# Patient Record
Sex: MALE | Race: WHITE | NOT HISPANIC OR LATINO | Employment: OTHER | URBAN - METROPOLITAN AREA
[De-identification: names, ages, dates, MRNs, and addresses within clinical notes are randomized per-mention and may not be internally consistent; named-entity substitution may affect disease eponyms.]

---

## 2017-01-01 ENCOUNTER — TREATMENT (OUTPATIENT)
Dept: PHYSICAL THERAPY | Facility: CLINIC | Age: 77
End: 2017-01-01

## 2017-01-01 ENCOUNTER — TELEPHONE (OUTPATIENT)
Dept: NEUROLOGY | Facility: CLINIC | Age: 77
End: 2017-01-01

## 2017-01-01 ENCOUNTER — HOSPITAL ENCOUNTER (OUTPATIENT)
Dept: NEUROLOGY | Facility: HOSPITAL | Age: 77
Discharge: HOME | End: 2017-05-31
Attending: PSYCHIATRY & NEUROLOGY | Admitting: PSYCHIATRY & NEUROLOGY

## 2017-01-01 ENCOUNTER — RESULTS ENCOUNTER (OUTPATIENT)
Dept: FAMILY MEDICINE CLINIC | Facility: CLINIC | Age: 77
End: 2017-01-01

## 2017-01-01 ENCOUNTER — APPOINTMENT (OUTPATIENT)
Dept: MRI IMAGING | Facility: HOSPITAL | Age: 77
End: 2017-01-01

## 2017-01-01 ENCOUNTER — FLU SHOT (OUTPATIENT)
Dept: FAMILY MEDICINE CLINIC | Facility: CLINIC | Age: 77
End: 2017-01-01

## 2017-01-01 ENCOUNTER — OFFICE VISIT (OUTPATIENT)
Dept: CARDIOLOGY | Facility: CLINIC | Age: 77
End: 2017-01-01

## 2017-01-01 ENCOUNTER — APPOINTMENT (OUTPATIENT)
Dept: GENERAL RADIOLOGY | Facility: HOSPITAL | Age: 77
End: 2017-01-01

## 2017-01-01 ENCOUNTER — TELEPHONE (OUTPATIENT)
Dept: CARDIOLOGY | Facility: CLINIC | Age: 77
End: 2017-01-01

## 2017-01-01 ENCOUNTER — HOSPITAL ENCOUNTER (INPATIENT)
Facility: HOSPITAL | Age: 77
LOS: 3 days | Discharge: NURSING, REHAB OR LONG-TERM CARE | End: 2018-01-02
Attending: EMERGENCY MEDICINE | Admitting: HOSPITALIST

## 2017-01-01 ENCOUNTER — HOSPITAL ENCOUNTER (EMERGENCY)
Facility: HOSPITAL | Age: 77
Discharge: HOME | End: 2017-11-07
Attending: EMERGENCY MEDICINE | Admitting: EMERGENCY MEDICINE

## 2017-01-01 ENCOUNTER — APPOINTMENT (OUTPATIENT)
Dept: CT IMAGING | Facility: HOSPITAL | Age: 77
End: 2017-01-01

## 2017-01-01 ENCOUNTER — TELEPHONE (OUTPATIENT)
Dept: SOCIAL WORK | Facility: HOSPITAL | Age: 77
End: 2017-01-01

## 2017-01-01 ENCOUNTER — OFFICE VISIT (OUTPATIENT)
Dept: FAMILY MEDICINE CLINIC | Facility: CLINIC | Age: 77
End: 2017-01-01

## 2017-01-01 ENCOUNTER — HOSPITAL ENCOUNTER (OUTPATIENT)
Facility: HOSPITAL | Age: 77
Setting detail: OBSERVATION
Discharge: HOME | End: 2017-04-20
Attending: EMERGENCY MEDICINE | Admitting: INTERNAL MEDICINE

## 2017-01-01 VITALS
TEMPERATURE: 97.4 F | SYSTOLIC BLOOD PRESSURE: 123 MMHG | HEART RATE: 59 BPM | RESPIRATION RATE: 16 BRPM | HEIGHT: 75 IN | DIASTOLIC BLOOD PRESSURE: 81 MMHG | BODY MASS INDEX: 19.89 KG/M2 | WEIGHT: 160 LBS

## 2017-01-01 VITALS
DIASTOLIC BLOOD PRESSURE: 80 MMHG | BODY MASS INDEX: 21.14 KG/M2 | HEIGHT: 75 IN | SYSTOLIC BLOOD PRESSURE: 124 MMHG | HEART RATE: 59 BPM | WEIGHT: 170 LBS

## 2017-01-01 VITALS
HEART RATE: 60 BPM | WEIGHT: 164 LBS | TEMPERATURE: 96.9 F | SYSTOLIC BLOOD PRESSURE: 158 MMHG | RESPIRATION RATE: 18 BRPM | BODY MASS INDEX: 20.39 KG/M2 | DIASTOLIC BLOOD PRESSURE: 95 MMHG | OXYGEN SATURATION: 96 % | HEIGHT: 75 IN

## 2017-01-01 VITALS
RESPIRATION RATE: 20 BRPM | SYSTOLIC BLOOD PRESSURE: 80 MMHG | BODY MASS INDEX: 20.58 KG/M2 | HEART RATE: 83 BPM | TEMPERATURE: 98.2 F | WEIGHT: 165.5 LBS | HEIGHT: 75 IN | DIASTOLIC BLOOD PRESSURE: 51 MMHG | OXYGEN SATURATION: 91 %

## 2017-01-01 DIAGNOSIS — F02.81 ALZHEIMER'S DEMENTIA WITH BEHAVIORAL DISTURBANCE, UNSPECIFIED TIMING OF DEMENTIA ONSET: Primary | ICD-10-CM

## 2017-01-01 DIAGNOSIS — G20 PARKINSON'S DISEASE (HCC): ICD-10-CM

## 2017-01-01 DIAGNOSIS — R29.898 WEAKNESS OF BOTH LOWER LIMBS: ICD-10-CM

## 2017-01-01 DIAGNOSIS — R29.898 WEAKNESS OF BOTH LOWER LIMBS: Primary | ICD-10-CM

## 2017-01-01 DIAGNOSIS — R26.9 GAIT DISTURBANCE: ICD-10-CM

## 2017-01-01 DIAGNOSIS — G20 PARKINSONISM, UNSPECIFIED PARKINSONISM TYPE (HCC): ICD-10-CM

## 2017-01-01 DIAGNOSIS — R26.81 GAIT INSTABILITY: Primary | ICD-10-CM

## 2017-01-01 DIAGNOSIS — G20 PARKINSON DISEASE (HCC): ICD-10-CM

## 2017-01-01 DIAGNOSIS — R26.81 GAIT INSTABILITY: ICD-10-CM

## 2017-01-01 DIAGNOSIS — R44.3 HALLUCINATIONS, UNSPECIFIED: ICD-10-CM

## 2017-01-01 DIAGNOSIS — E03.9 ACQUIRED HYPOTHYROIDISM: ICD-10-CM

## 2017-01-01 DIAGNOSIS — G20 PARKINSON'S DISEASE (HCC): Primary | ICD-10-CM

## 2017-01-01 DIAGNOSIS — F02.81 ALZHEIMER'S DEMENTIA WITH BEHAVIORAL DISTURBANCE, UNSPECIFIED TIMING OF DEMENTIA ONSET: ICD-10-CM

## 2017-01-01 DIAGNOSIS — R39.14 BENIGN PROSTATIC HYPERTROPHY (BPH) WITH INCOMPLETE BLADDER EMPTYING: ICD-10-CM

## 2017-01-01 DIAGNOSIS — N40.1 BENIGN PROSTATIC HYPERTROPHY (BPH) WITH INCOMPLETE BLADDER EMPTYING: ICD-10-CM

## 2017-01-01 DIAGNOSIS — E03.9 ACQUIRED HYPOTHYROIDISM: Primary | ICD-10-CM

## 2017-01-01 DIAGNOSIS — R53.1 GENERALIZED WEAKNESS: Primary | ICD-10-CM

## 2017-01-01 DIAGNOSIS — R77.8 ELEVATED TROPONIN: ICD-10-CM

## 2017-01-01 DIAGNOSIS — R56.9 NEW ONSET SEIZURE (HCC): ICD-10-CM

## 2017-01-01 DIAGNOSIS — R53.1 WEAKNESS: Primary | ICD-10-CM

## 2017-01-01 DIAGNOSIS — G30.9 ALZHEIMER'S DEMENTIA WITH BEHAVIORAL DISTURBANCE, UNSPECIFIED TIMING OF DEMENTIA ONSET: Primary | ICD-10-CM

## 2017-01-01 DIAGNOSIS — R56.9 NEW ONSET SEIZURE (HCC): Primary | ICD-10-CM

## 2017-01-01 DIAGNOSIS — I10 ESSENTIAL HYPERTENSION: Primary | ICD-10-CM

## 2017-01-01 DIAGNOSIS — Z13.6 SCREENING FOR ISCHEMIC HEART DISEASE: ICD-10-CM

## 2017-01-01 DIAGNOSIS — G30.9 ALZHEIMER'S DEMENTIA WITH BEHAVIORAL DISTURBANCE, UNSPECIFIED TIMING OF DEMENTIA ONSET: ICD-10-CM

## 2017-01-01 DIAGNOSIS — R26.2 DIFFICULTY WALKING: ICD-10-CM

## 2017-01-01 LAB
ALBUMIN SERPL-MCNC: 3 G/DL (ref 3.5–5.2)
ALBUMIN SERPL-MCNC: 3.3 G/DL (ref 3.5–5.2)
ALBUMIN SERPL-MCNC: 3.6 G/DL (ref 3.5–5.2)
ALBUMIN SERPL-MCNC: 3.8 G/DL (ref 3.5–5.2)
ALBUMIN SERPL-MCNC: 4 G/DL (ref 3.5–4.8)
ALBUMIN/GLOB SERPL: 1.2 G/DL
ALBUMIN/GLOB SERPL: 1.3 G/DL
ALBUMIN/GLOB SERPL: 1.3 G/DL
ALBUMIN/GLOB SERPL: 1.4 G/DL
ALBUMIN/GLOB SERPL: 2.1 {RATIO} (ref 1.2–2.2)
ALP SERPL-CCNC: 104 U/L (ref 39–117)
ALP SERPL-CCNC: 116 IU/L (ref 39–117)
ALP SERPL-CCNC: 116 U/L (ref 39–117)
ALP SERPL-CCNC: 121 U/L (ref 39–117)
ALP SERPL-CCNC: 133 U/L (ref 39–117)
ALT SERPL W P-5'-P-CCNC: 10 U/L (ref 1–41)
ALT SERPL W P-5'-P-CCNC: 13 U/L (ref 1–41)
ALT SERPL W P-5'-P-CCNC: 13 U/L (ref 1–41)
ALT SERPL W P-5'-P-CCNC: 16 U/L (ref 1–41)
ALT SERPL-CCNC: 8 IU/L (ref 0–44)
ANION GAP SERPL CALCULATED.3IONS-SCNC: 11.1 MMOL/L
ANION GAP SERPL CALCULATED.3IONS-SCNC: 11.2 MMOL/L
ANION GAP SERPL CALCULATED.3IONS-SCNC: 12.5 MMOL/L
ANION GAP SERPL CALCULATED.3IONS-SCNC: 12.9 MMOL/L
ANION GAP SERPL CALCULATED.3IONS-SCNC: 14.7 MMOL/L
AST SERPL-CCNC: 16 U/L (ref 1–40)
AST SERPL-CCNC: 17 U/L (ref 1–40)
AST SERPL-CCNC: 18 IU/L (ref 0–40)
AST SERPL-CCNC: 21 U/L (ref 1–40)
AST SERPL-CCNC: 22 U/L (ref 1–40)
BACTERIA UR QL AUTO: ABNORMAL /HPF
BASOPHILS # BLD AUTO: 0.02 10*3/MM3 (ref 0–0.2)
BASOPHILS # BLD AUTO: 0.03 10*3/MM3 (ref 0–0.2)
BASOPHILS # BLD AUTO: 0.03 10*3/MM3 (ref 0–0.2)
BASOPHILS # BLD AUTO: 0.04 10*3/MM3 (ref 0–0.2)
BASOPHILS # BLD AUTO: 0.1 X10E3/UL (ref 0–0.2)
BASOPHILS NFR BLD AUTO: 0.2 % (ref 0–1.5)
BASOPHILS NFR BLD AUTO: 0.3 % (ref 0–1.5)
BASOPHILS NFR BLD AUTO: 1 %
BILIRUB SERPL-MCNC: 0.3 MG/DL (ref 0.1–1.2)
BILIRUB SERPL-MCNC: 0.5 MG/DL (ref 0–1.2)
BILIRUB SERPL-MCNC: 0.8 MG/DL (ref 0.1–1.2)
BILIRUB UR QL STRIP: NEGATIVE
BUN BLD-MCNC: 12 MG/DL (ref 8–23)
BUN BLD-MCNC: 14 MG/DL (ref 8–23)
BUN BLD-MCNC: 17 MG/DL (ref 8–23)
BUN BLD-MCNC: 22 MG/DL (ref 8–23)
BUN BLD-MCNC: 25 MG/DL (ref 8–23)
BUN SERPL-MCNC: 20 MG/DL (ref 8–27)
BUN/CREAT SERPL: 11.5 (ref 7–25)
BUN/CREAT SERPL: 13.5 (ref 7–25)
BUN/CREAT SERPL: 15.3 (ref 7–25)
BUN/CREAT SERPL: 16 (ref 10–24)
BUN/CREAT SERPL: 19.8 (ref 7–25)
BUN/CREAT SERPL: 21.9 (ref 7–25)
CALCIUM SERPL-MCNC: 8.7 MG/DL (ref 8.6–10.2)
CALCIUM SPEC-SCNC: 8 MG/DL (ref 8.6–10.5)
CALCIUM SPEC-SCNC: 8.3 MG/DL (ref 8.6–10.5)
CALCIUM SPEC-SCNC: 8.6 MG/DL (ref 8.6–10.5)
CALCIUM SPEC-SCNC: 8.8 MG/DL (ref 8.6–10.5)
CALCIUM SPEC-SCNC: 8.9 MG/DL (ref 8.6–10.5)
CHLORIDE SERPL-SCNC: 101 MMOL/L (ref 96–106)
CHLORIDE SERPL-SCNC: 101 MMOL/L (ref 98–107)
CHLORIDE SERPL-SCNC: 103 MMOL/L (ref 98–107)
CHLORIDE SERPL-SCNC: 104 MMOL/L (ref 98–107)
CHLORIDE SERPL-SCNC: 98 MMOL/L (ref 98–107)
CHLORIDE SERPL-SCNC: 99 MMOL/L (ref 98–107)
CHOLEST SERPL-MCNC: 131 MG/DL (ref 100–199)
CLARITY UR: ABNORMAL
CLARITY UR: CLEAR
CO2 SERPL-SCNC: 22.3 MMOL/L (ref 22–29)
CO2 SERPL-SCNC: 22.5 MMOL/L (ref 22–29)
CO2 SERPL-SCNC: 23 MMOL/L (ref 18–29)
CO2 SERPL-SCNC: 24.9 MMOL/L (ref 22–29)
CO2 SERPL-SCNC: 26.1 MMOL/L (ref 22–29)
CO2 SERPL-SCNC: 26.8 MMOL/L (ref 22–29)
COLOR UR: ABNORMAL
COLOR UR: YELLOW
CREAT BLD-MCNC: 0.89 MG/DL (ref 0.76–1.27)
CREAT BLD-MCNC: 1.11 MG/DL (ref 0.76–1.27)
CREAT BLD-MCNC: 1.11 MG/DL (ref 0.76–1.27)
CREAT BLD-MCNC: 1.14 MG/DL (ref 0.76–1.27)
CREAT BLD-MCNC: 1.22 MG/DL (ref 0.76–1.27)
CREAT SERPL-MCNC: 1.24 MG/DL (ref 0.76–1.27)
DEPRECATED RDW RBC AUTO: 43.9 FL (ref 37–54)
DEPRECATED RDW RBC AUTO: 44.1 FL (ref 37–54)
DEPRECATED RDW RBC AUTO: 45.2 FL (ref 37–54)
DEPRECATED RDW RBC AUTO: 45.3 FL (ref 37–54)
DEPRECATED RDW RBC AUTO: 47 FL (ref 37–54)
EOSINOPHIL # BLD AUTO: 0.02 10*3/MM3 (ref 0–0.7)
EOSINOPHIL # BLD AUTO: 0.02 10*3/MM3 (ref 0–0.7)
EOSINOPHIL # BLD AUTO: 0.1 X10E3/UL (ref 0–0.4)
EOSINOPHIL # BLD AUTO: 0.15 10*3/MM3 (ref 0–0.7)
EOSINOPHIL # BLD AUTO: 0.19 10*3/MM3 (ref 0–0.7)
EOSINOPHIL NFR BLD AUTO: 0.2 % (ref 0.3–6.2)
EOSINOPHIL NFR BLD AUTO: 0.3 % (ref 0.3–6.2)
EOSINOPHIL NFR BLD AUTO: 1 %
EOSINOPHIL NFR BLD AUTO: 1.2 % (ref 0.3–6.2)
EOSINOPHIL NFR BLD AUTO: 1.7 % (ref 0.3–6.2)
ERYTHROCYTE [DISTWIDTH] IN BLOOD BY AUTOMATED COUNT: 13.4 % (ref 11.5–14.5)
ERYTHROCYTE [DISTWIDTH] IN BLOOD BY AUTOMATED COUNT: 13.6 % (ref 11.5–14.5)
ERYTHROCYTE [DISTWIDTH] IN BLOOD BY AUTOMATED COUNT: 14 % (ref 11.5–14.5)
ERYTHROCYTE [DISTWIDTH] IN BLOOD BY AUTOMATED COUNT: 14 % (ref 11.5–14.5)
ERYTHROCYTE [DISTWIDTH] IN BLOOD BY AUTOMATED COUNT: 14.6 % (ref 11.5–14.5)
ERYTHROCYTE [DISTWIDTH] IN BLOOD BY AUTOMATED COUNT: 14.9 % (ref 12.3–15.4)
GFR SERPL CREATININE-BSD FRML MDRD: 57 ML/MIN/1.73
GFR SERPL CREATININE-BSD FRML MDRD: 62 ML/MIN/1.73
GFR SERPL CREATININE-BSD FRML MDRD: 64 ML/MIN/1.73
GFR SERPL CREATININE-BSD FRML MDRD: 64 ML/MIN/1.73
GFR SERPL CREATININE-BSD FRML MDRD: 83 ML/MIN/1.73
GLOBULIN SER CALC-MCNC: 1.9 G/DL (ref 1.5–4.5)
GLOBULIN UR ELPH-MCNC: 2.3 GM/DL
GLOBULIN UR ELPH-MCNC: 2.7 GM/DL
GLOBULIN UR ELPH-MCNC: 2.7 GM/DL
GLOBULIN UR ELPH-MCNC: 2.8 GM/DL
GLUCOSE BLD-MCNC: 169 MG/DL (ref 65–99)
GLUCOSE BLD-MCNC: 76 MG/DL (ref 65–99)
GLUCOSE BLD-MCNC: 79 MG/DL (ref 65–99)
GLUCOSE BLD-MCNC: 88 MG/DL (ref 65–99)
GLUCOSE BLD-MCNC: 91 MG/DL (ref 65–99)
GLUCOSE BLDC GLUCOMTR-MCNC: 92 MG/DL (ref 70–130)
GLUCOSE SERPL-MCNC: 86 MG/DL (ref 65–99)
GLUCOSE UR STRIP-MCNC: NEGATIVE MG/DL
HCT VFR BLD AUTO: 34.3 % (ref 40.4–52.2)
HCT VFR BLD AUTO: 36.5 % (ref 40.4–52.2)
HCT VFR BLD AUTO: 36.6 % (ref 40.4–52.2)
HCT VFR BLD AUTO: 37.7 % (ref 37.5–51)
HCT VFR BLD AUTO: 37.7 % (ref 40.4–52.2)
HCT VFR BLD AUTO: 40.4 % (ref 40.4–52.2)
HDLC SERPL-MCNC: 57 MG/DL
HGB BLD-MCNC: 11.1 G/DL (ref 13.7–17.6)
HGB BLD-MCNC: 11.7 G/DL (ref 13.7–17.6)
HGB BLD-MCNC: 11.9 G/DL (ref 13.7–17.6)
HGB BLD-MCNC: 12.3 G/DL (ref 13.7–17.6)
HGB BLD-MCNC: 12.4 G/DL (ref 12.6–17.7)
HGB BLD-MCNC: 13.2 G/DL (ref 13.7–17.6)
HGB UR QL STRIP.AUTO: ABNORMAL
HGB UR QL STRIP.AUTO: NEGATIVE
HOLD SPECIMEN: NORMAL
HYALINE CASTS UR QL AUTO: ABNORMAL /LPF
IMM GRANULOCYTES # BLD: 0 X10E3/UL (ref 0–0.1)
IMM GRANULOCYTES # BLD: 0.02 10*3/MM3 (ref 0–0.03)
IMM GRANULOCYTES # BLD: 0.03 10*3/MM3 (ref 0–0.03)
IMM GRANULOCYTES # BLD: 0.03 10*3/MM3 (ref 0–0.03)
IMM GRANULOCYTES # BLD: 0.04 10*3/MM3 (ref 0–0.03)
IMM GRANULOCYTES NFR BLD: 0 %
IMM GRANULOCYTES NFR BLD: 0.2 % (ref 0–0.5)
IMM GRANULOCYTES NFR BLD: 0.3 % (ref 0–0.5)
IMM GRANULOCYTES NFR BLD: 0.3 % (ref 0–0.5)
IMM GRANULOCYTES NFR BLD: 0.5 % (ref 0–0.5)
KETONES UR QL STRIP: ABNORMAL
KETONES UR QL STRIP: NEGATIVE
LDLC SERPL CALC-MCNC: 60 MG/DL (ref 0–99)
LEUKOCYTE ESTERASE UR QL STRIP.AUTO: NEGATIVE
LYMPHOCYTES # BLD AUTO: 0.99 10*3/MM3 (ref 0.9–4.8)
LYMPHOCYTES # BLD AUTO: 1.19 10*3/MM3 (ref 0.9–4.8)
LYMPHOCYTES # BLD AUTO: 1.72 10*3/MM3 (ref 0.9–4.8)
LYMPHOCYTES # BLD AUTO: 1.91 10*3/MM3 (ref 0.9–4.8)
LYMPHOCYTES # BLD AUTO: 2.1 X10E3/UL (ref 0.7–3.1)
LYMPHOCYTES NFR BLD AUTO: 14.8 % (ref 19.6–45.3)
LYMPHOCYTES NFR BLD AUTO: 15.2 % (ref 19.6–45.3)
LYMPHOCYTES NFR BLD AUTO: 15.3 % (ref 19.6–45.3)
LYMPHOCYTES NFR BLD AUTO: 20 %
LYMPHOCYTES NFR BLD AUTO: 9.6 % (ref 19.6–45.3)
MAGNESIUM SERPL-MCNC: 2.1 MG/DL (ref 1.6–2.4)
MAGNESIUM SERPL-MCNC: 2.1 MG/DL (ref 1.6–2.4)
MCH RBC QN AUTO: 28.1 PG (ref 26.6–33)
MCH RBC QN AUTO: 28.5 PG (ref 27–32.7)
MCH RBC QN AUTO: 28.7 PG (ref 27–32.7)
MCH RBC QN AUTO: 28.8 PG (ref 27–32.7)
MCH RBC QN AUTO: 28.8 PG (ref 27–32.7)
MCH RBC QN AUTO: 28.9 PG (ref 27–32.7)
MCHC RBC AUTO-ENTMCNC: 32 G/DL (ref 32.6–36.4)
MCHC RBC AUTO-ENTMCNC: 32.4 G/DL (ref 32.6–36.4)
MCHC RBC AUTO-ENTMCNC: 32.6 G/DL (ref 32.6–36.4)
MCHC RBC AUTO-ENTMCNC: 32.6 G/DL (ref 32.6–36.4)
MCHC RBC AUTO-ENTMCNC: 32.7 G/DL (ref 32.6–36.4)
MCHC RBC AUTO-ENTMCNC: 32.9 G/DL (ref 31.5–35.7)
MCV RBC AUTO: 86 FL (ref 79–97)
MCV RBC AUTO: 87.9 FL (ref 79.8–96.2)
MCV RBC AUTO: 88.2 FL (ref 79.8–96.2)
MCV RBC AUTO: 88.4 FL (ref 79.8–96.2)
MCV RBC AUTO: 88.7 FL (ref 79.8–96.2)
MCV RBC AUTO: 89.7 FL (ref 79.8–96.2)
MONOCYTES # BLD AUTO: 0.49 10*3/MM3 (ref 0.2–1.2)
MONOCYTES # BLD AUTO: 0.6 X10E3/UL (ref 0.1–0.9)
MONOCYTES # BLD AUTO: 0.97 10*3/MM3 (ref 0.2–1.2)
MONOCYTES # BLD AUTO: 1.14 10*3/MM3 (ref 0.2–1.2)
MONOCYTES # BLD AUTO: 1.19 10*3/MM3 (ref 0.2–1.2)
MONOCYTES NFR BLD AUTO: 6 %
MONOCYTES NFR BLD AUTO: 7.6 % (ref 5–12)
MONOCYTES NFR BLD AUTO: 8.6 % (ref 5–12)
MONOCYTES NFR BLD AUTO: 8.8 % (ref 5–12)
MONOCYTES NFR BLD AUTO: 9.6 % (ref 5–12)
NEUTROPHILS # BLD AUTO: 4.94 10*3/MM3 (ref 1.9–8.1)
NEUTROPHILS # BLD AUTO: 7.6 X10E3/UL (ref 1.4–7)
NEUTROPHILS # BLD AUTO: 8.39 10*3/MM3 (ref 1.9–8.1)
NEUTROPHILS # BLD AUTO: 9.63 10*3/MM3 (ref 1.9–8.1)
NEUTROPHILS # BLD AUTO: 9.98 10*3/MM3 (ref 1.9–8.1)
NEUTROPHILS NFR BLD AUTO: 72 %
NEUTROPHILS NFR BLD AUTO: 73.9 % (ref 42.7–76)
NEUTROPHILS NFR BLD AUTO: 74.7 % (ref 42.7–76)
NEUTROPHILS NFR BLD AUTO: 76 % (ref 42.7–76)
NEUTROPHILS NFR BLD AUTO: 80.1 % (ref 42.7–76)
NITRITE UR QL STRIP: NEGATIVE
NT-PROBNP SERPL-MCNC: 778 PG/ML (ref 0–1800)
PH UR STRIP.AUTO: 6 [PH] (ref 5–8)
PH UR STRIP.AUTO: 6 [PH] (ref 5–8)
PH UR STRIP.AUTO: 6.5 [PH] (ref 5–8)
PH UR STRIP.AUTO: 6.5 [PH] (ref 5–8)
PLATELET # BLD AUTO: 180 10*3/MM3 (ref 140–500)
PLATELET # BLD AUTO: 220 10*3/MM3 (ref 140–500)
PLATELET # BLD AUTO: 245 10*3/MM3 (ref 140–500)
PLATELET # BLD AUTO: 253 10*3/MM3 (ref 140–500)
PLATELET # BLD AUTO: 290 10*3/MM3 (ref 140–500)
PLATELET # BLD AUTO: 309 X10E3/UL (ref 150–379)
PMV BLD AUTO: 9.1 FL (ref 6–12)
PMV BLD AUTO: 9.3 FL (ref 6–12)
PMV BLD AUTO: 9.4 FL (ref 6–12)
PMV BLD AUTO: 9.5 FL (ref 6–12)
PMV BLD AUTO: 9.6 FL (ref 6–12)
POTASSIUM BLD-SCNC: 3.7 MMOL/L (ref 3.5–5.2)
POTASSIUM BLD-SCNC: 4.1 MMOL/L (ref 3.5–5.2)
POTASSIUM BLD-SCNC: 4.3 MMOL/L (ref 3.5–5.2)
POTASSIUM BLD-SCNC: 4.4 MMOL/L (ref 3.5–5.2)
POTASSIUM BLD-SCNC: 4.5 MMOL/L (ref 3.5–5.2)
POTASSIUM SERPL-SCNC: 4.7 MMOL/L (ref 3.5–5.2)
PROCALCITONIN SERPL-MCNC: 0.05 NG/ML (ref 0.1–0.25)
PROT SERPL-MCNC: 5.3 G/DL (ref 6–8.5)
PROT SERPL-MCNC: 5.9 G/DL (ref 6–8.5)
PROT SERPL-MCNC: 6.1 G/DL (ref 6–8.5)
PROT SERPL-MCNC: 6.3 G/DL (ref 6–8.5)
PROT SERPL-MCNC: 6.5 G/DL (ref 6–8.5)
PROT UR QL STRIP: ABNORMAL
PROT UR QL STRIP: ABNORMAL
PROT UR QL STRIP: NEGATIVE
PROT UR QL STRIP: NEGATIVE
PSA SERPL-MCNC: 1.4 NG/ML (ref 0–4)
RBC # BLD AUTO: 3.9 10*6/MM3 (ref 4.6–6)
RBC # BLD AUTO: 4.08 10*6/MM3 (ref 4.6–6)
RBC # BLD AUTO: 4.13 10*6/MM3 (ref 4.6–6)
RBC # BLD AUTO: 4.25 10*6/MM3 (ref 4.6–6)
RBC # BLD AUTO: 4.41 X10E6/UL (ref 4.14–5.8)
RBC # BLD AUTO: 4.58 10*6/MM3 (ref 4.6–6)
RBC # UR: ABNORMAL /HPF
REF LAB TEST METHOD: ABNORMAL
SODIUM BLD-SCNC: 135 MMOL/L (ref 136–145)
SODIUM BLD-SCNC: 138 MMOL/L (ref 136–145)
SODIUM BLD-SCNC: 138 MMOL/L (ref 136–145)
SODIUM BLD-SCNC: 139 MMOL/L (ref 136–145)
SODIUM BLD-SCNC: 140 MMOL/L (ref 136–145)
SODIUM SERPL-SCNC: 143 MMOL/L (ref 134–144)
SP GR UR STRIP: 1.01 (ref 1–1.03)
SP GR UR STRIP: 1.02 (ref 1–1.03)
SQUAMOUS #/AREA URNS HPF: ABNORMAL /HPF
TRIGL SERPL-MCNC: 71 MG/DL (ref 0–149)
TROPONIN T SERPL-MCNC: 0.02 NG/ML (ref 0–0.03)
TROPONIN T SERPL-MCNC: 0.02 NG/ML (ref 0–0.03)
TROPONIN T SERPL-MCNC: 0.03 NG/ML (ref 0–0.03)
TROPONIN T SERPL-MCNC: 0.04 NG/ML (ref 0–0.03)
TSH SERPL DL<=0.005 MIU/L-ACNC: 6.08 UIU/ML (ref 0.45–4.5)
TSH SERPL DL<=0.05 MIU/L-ACNC: 2.44 MIU/ML (ref 0.27–4.2)
UROBILINOGEN UR QL STRIP: ABNORMAL
UROBILINOGEN UR QL STRIP: ABNORMAL
UROBILINOGEN UR QL STRIP: NORMAL
UROBILINOGEN UR QL STRIP: NORMAL
VIT B12 BLD-MCNC: 434 PG/ML (ref 211–946)
VLDLC SERPL CALC-MCNC: 14 MG/DL (ref 5–40)
WBC # BLD AUTO: 10.6 X10E3/UL (ref 3.4–10.8)
WBC NRBC COR # BLD: 11.33 10*3/MM3 (ref 4.5–10.7)
WBC NRBC COR # BLD: 12.45 10*3/MM3 (ref 4.5–10.7)
WBC NRBC COR # BLD: 12.89 10*3/MM3 (ref 4.5–10.7)
WBC NRBC COR # BLD: 6.49 10*3/MM3 (ref 4.5–10.7)
WBC NRBC COR # BLD: 9.22 10*3/MM3 (ref 4.5–10.7)
WBC UR QL AUTO: ABNORMAL /HPF
WHOLE BLOOD HOLD SPECIMEN: NORMAL

## 2017-01-01 PROCEDURE — 95812 EEG 41-60 MINUTES: CPT

## 2017-01-01 PROCEDURE — 99204 OFFICE O/P NEW MOD 45 MIN: CPT | Performed by: PSYCHIATRY & NEUROLOGY

## 2017-01-01 PROCEDURE — 25010000002 PIPERACILLIN SOD-TAZOBACTAM PER 1 G: Performed by: HOSPITALIST

## 2017-01-01 PROCEDURE — 70551 MRI BRAIN STEM W/O DYE: CPT

## 2017-01-01 PROCEDURE — 84484 ASSAY OF TROPONIN QUANT: CPT | Performed by: EMERGENCY MEDICINE

## 2017-01-01 PROCEDURE — 85025 COMPLETE CBC W/AUTO DIFF WBC: CPT | Performed by: INTERNAL MEDICINE

## 2017-01-01 PROCEDURE — 97116 GAIT TRAINING THERAPY: CPT | Performed by: PHYSICAL THERAPIST

## 2017-01-01 PROCEDURE — 70450 CT HEAD/BRAIN W/O DYE: CPT

## 2017-01-01 PROCEDURE — G0378 HOSPITAL OBSERVATION PER HR: HCPCS

## 2017-01-01 PROCEDURE — 81001 URINALYSIS AUTO W/SCOPE: CPT | Performed by: EMERGENCY MEDICINE

## 2017-01-01 PROCEDURE — 96360 HYDRATION IV INFUSION INIT: CPT

## 2017-01-01 PROCEDURE — 99284 EMERGENCY DEPT VISIT MOD MDM: CPT

## 2017-01-01 PROCEDURE — 97110 THERAPEUTIC EXERCISES: CPT | Performed by: PHYSICAL THERAPIST

## 2017-01-01 PROCEDURE — 36415 COLL VENOUS BLD VENIPUNCTURE: CPT | Performed by: EMERGENCY MEDICINE

## 2017-01-01 PROCEDURE — 99214 OFFICE O/P EST MOD 30 MIN: CPT | Performed by: FAMILY MEDICINE

## 2017-01-01 PROCEDURE — 83880 ASSAY OF NATRIURETIC PEPTIDE: CPT | Performed by: EMERGENCY MEDICINE

## 2017-01-01 PROCEDURE — 25010000002 HEPARIN (PORCINE) PER 1000 UNITS: Performed by: INTERNAL MEDICINE

## 2017-01-01 PROCEDURE — 81003 URINALYSIS AUTO W/O SCOPE: CPT | Performed by: EMERGENCY MEDICINE

## 2017-01-01 PROCEDURE — 96372 THER/PROPH/DIAG INJ SC/IM: CPT

## 2017-01-01 PROCEDURE — 85025 COMPLETE CBC W/AUTO DIFF WBC: CPT | Performed by: EMERGENCY MEDICINE

## 2017-01-01 PROCEDURE — 84484 ASSAY OF TROPONIN QUANT: CPT | Performed by: INTERNAL MEDICINE

## 2017-01-01 PROCEDURE — G8979 MOBILITY GOAL STATUS: HCPCS

## 2017-01-01 PROCEDURE — 99213 OFFICE O/P EST LOW 20 MIN: CPT | Performed by: INTERNAL MEDICINE

## 2017-01-01 PROCEDURE — 97162 PT EVAL MOD COMPLEX 30 MIN: CPT

## 2017-01-01 PROCEDURE — 93010 ELECTROCARDIOGRAM REPORT: CPT | Performed by: INTERNAL MEDICINE

## 2017-01-01 PROCEDURE — 80053 COMPREHEN METABOLIC PANEL: CPT | Performed by: EMERGENCY MEDICINE

## 2017-01-01 PROCEDURE — 93000 ELECTROCARDIOGRAM COMPLETE: CPT | Performed by: INTERNAL MEDICINE

## 2017-01-01 PROCEDURE — 97110 THERAPEUTIC EXERCISES: CPT

## 2017-01-01 PROCEDURE — 82607 VITAMIN B-12: CPT | Performed by: PSYCHIATRY & NEUROLOGY

## 2017-01-01 PROCEDURE — 94799 UNLISTED PULMONARY SVC/PX: CPT

## 2017-01-01 PROCEDURE — 81003 URINALYSIS AUTO W/O SCOPE: CPT | Performed by: HOSPITALIST

## 2017-01-01 PROCEDURE — 97530 THERAPEUTIC ACTIVITIES: CPT | Performed by: PHYSICAL THERAPIST

## 2017-01-01 PROCEDURE — 84145 PROCALCITONIN (PCT): CPT | Performed by: HOSPITALIST

## 2017-01-01 PROCEDURE — 97112 NEUROMUSCULAR REEDUCATION: CPT | Performed by: PHYSICAL THERAPIST

## 2017-01-01 PROCEDURE — 82962 GLUCOSE BLOOD TEST: CPT

## 2017-01-01 PROCEDURE — 93005 ELECTROCARDIOGRAM TRACING: CPT | Performed by: EMERGENCY MEDICINE

## 2017-01-01 PROCEDURE — 71020 HC CHEST PA AND LATERAL: CPT

## 2017-01-01 PROCEDURE — G8978 MOBILITY CURRENT STATUS: HCPCS

## 2017-01-01 PROCEDURE — 99285 EMERGENCY DEPT VISIT HI MDM: CPT

## 2017-01-01 PROCEDURE — G0008 ADMIN INFLUENZA VIRUS VAC: HCPCS | Performed by: FAMILY MEDICINE

## 2017-01-01 PROCEDURE — 84443 ASSAY THYROID STIM HORMONE: CPT | Performed by: PSYCHIATRY & NEUROLOGY

## 2017-01-01 PROCEDURE — 97161 PT EVAL LOW COMPLEX 20 MIN: CPT | Performed by: PHYSICAL THERAPIST

## 2017-01-01 PROCEDURE — 85027 COMPLETE CBC AUTOMATED: CPT | Performed by: HOSPITALIST

## 2017-01-01 PROCEDURE — 90686 IIV4 VACC NO PRSV 0.5 ML IM: CPT | Performed by: FAMILY MEDICINE

## 2017-01-01 PROCEDURE — 94640 AIRWAY INHALATION TREATMENT: CPT

## 2017-01-01 PROCEDURE — 80053 COMPREHEN METABOLIC PANEL: CPT | Performed by: INTERNAL MEDICINE

## 2017-01-01 PROCEDURE — 96361 HYDRATE IV INFUSION ADD-ON: CPT

## 2017-01-01 PROCEDURE — 83735 ASSAY OF MAGNESIUM: CPT | Performed by: EMERGENCY MEDICINE

## 2017-01-01 PROCEDURE — 80048 BASIC METABOLIC PNL TOTAL CA: CPT | Performed by: HOSPITALIST

## 2017-01-01 PROCEDURE — 71010 HC CHEST PA OR AP: CPT

## 2017-01-01 RX ORDER — PRAMIPEXOLE DIHYDROCHLORIDE 0.12 MG/1
0.12 TABLET ORAL 3 TIMES DAILY
COMMUNITY
End: 2018-01-01 | Stop reason: HOSPADM

## 2017-01-01 RX ORDER — MEMANTINE HYDROCHLORIDE 10 MG/1
10 TABLET ORAL EVERY 12 HOURS SCHEDULED
Status: DISCONTINUED | OUTPATIENT
Start: 2017-01-01 | End: 2017-01-01 | Stop reason: HOSPADM

## 2017-01-01 RX ORDER — SODIUM CHLORIDE 0.9 % (FLUSH) 0.9 %
1-10 SYRINGE (ML) INJECTION AS NEEDED
Status: DISCONTINUED | OUTPATIENT
Start: 2017-01-01 | End: 2018-01-01 | Stop reason: HOSPADM

## 2017-01-01 RX ORDER — LEVOTHYROXINE SODIUM 0.05 MG/1
50 TABLET ORAL DAILY
Qty: 90 TABLET | Refills: 1 | Status: ON HOLD | OUTPATIENT
Start: 2017-01-01 | End: 2018-01-01

## 2017-01-01 RX ORDER — ISOSORBIDE MONONITRATE 30 MG/1
30 TABLET, EXTENDED RELEASE ORAL DAILY
Qty: 30 TABLET | Refills: 3 | Status: SHIPPED | OUTPATIENT
Start: 2017-01-01

## 2017-01-01 RX ORDER — ISOSORBIDE MONONITRATE 30 MG/1
30 TABLET, EXTENDED RELEASE ORAL DAILY
Status: DISCONTINUED | OUTPATIENT
Start: 2017-01-01 | End: 2018-01-01 | Stop reason: HOSPADM

## 2017-01-01 RX ORDER — AMLODIPINE BESYLATE 2.5 MG/1
2.5 TABLET ORAL DAILY
Qty: 30 TABLET | Refills: 3 | Status: SHIPPED | OUTPATIENT
Start: 2017-01-01 | End: 2018-01-01 | Stop reason: HOSPADM

## 2017-01-01 RX ORDER — ONDANSETRON 2 MG/ML
4 INJECTION INTRAMUSCULAR; INTRAVENOUS EVERY 6 HOURS PRN
Status: DISCONTINUED | OUTPATIENT
Start: 2017-01-01 | End: 2017-01-01 | Stop reason: HOSPADM

## 2017-01-01 RX ORDER — SODIUM CHLORIDE 9 MG/ML
100 INJECTION, SOLUTION INTRAVENOUS CONTINUOUS
Status: DISCONTINUED | OUTPATIENT
Start: 2017-01-01 | End: 2017-01-01

## 2017-01-01 RX ORDER — SODIUM CHLORIDE 0.9 % (FLUSH) 0.9 %
10 SYRINGE (ML) INJECTION AS NEEDED
Status: DISCONTINUED | OUTPATIENT
Start: 2017-01-01 | End: 2018-01-01 | Stop reason: HOSPADM

## 2017-01-01 RX ORDER — RISPERIDONE 1 MG/1
1 TABLET ORAL NIGHTLY
Status: DISCONTINUED | OUTPATIENT
Start: 2017-01-01 | End: 2018-01-01 | Stop reason: HOSPADM

## 2017-01-01 RX ORDER — PRAMIPEXOLE DIHYDROCHLORIDE 0.12 MG/1
TABLET ORAL
COMMUNITY
Start: 2017-01-01 | End: 2017-01-01 | Stop reason: SDUPTHER

## 2017-01-01 RX ORDER — ISOSORBIDE MONONITRATE 30 MG/1
30 TABLET, EXTENDED RELEASE ORAL
Qty: 30 TABLET | Refills: 3 | Status: SHIPPED | OUTPATIENT
Start: 2017-01-01 | End: 2017-01-01 | Stop reason: SDUPTHER

## 2017-01-01 RX ORDER — MIRTAZAPINE 30 MG/1
30 TABLET, FILM COATED ORAL DAILY
COMMUNITY
Start: 2017-01-01 | End: 2018-01-01 | Stop reason: HOSPADM

## 2017-01-01 RX ORDER — LEVOTHYROXINE SODIUM 0.05 MG/1
50 TABLET ORAL
Status: DISCONTINUED | OUTPATIENT
Start: 2017-01-01 | End: 2018-01-01 | Stop reason: HOSPADM

## 2017-01-01 RX ORDER — AMLODIPINE BESYLATE 2.5 MG/1
2.5 TABLET ORAL DAILY
Status: DISCONTINUED | OUTPATIENT
Start: 2017-01-01 | End: 2018-01-01 | Stop reason: HOSPADM

## 2017-01-01 RX ORDER — MEMANTINE HYDROCHLORIDE 28 MG/1
28 CAPSULE, EXTENDED RELEASE ORAL DAILY
Qty: 90 CAPSULE | Refills: 0 | Status: SHIPPED | OUTPATIENT
Start: 2017-01-01 | End: 2017-01-01 | Stop reason: SDUPTHER

## 2017-01-01 RX ORDER — RISPERIDONE 1 MG/1
1 TABLET ORAL NIGHTLY
COMMUNITY
Start: 2017-01-01 | End: 2018-01-01 | Stop reason: HOSPADM

## 2017-01-01 RX ORDER — SODIUM CHLORIDE 0.9 % (FLUSH) 0.9 %
1-10 SYRINGE (ML) INJECTION AS NEEDED
Status: DISCONTINUED | OUTPATIENT
Start: 2017-01-01 | End: 2017-01-01 | Stop reason: HOSPADM

## 2017-01-01 RX ORDER — MEMANTINE HYDROCHLORIDE 28 MG/1
28 CAPSULE, EXTENDED RELEASE ORAL DAILY
COMMUNITY

## 2017-01-01 RX ORDER — POLYETHYLENE GLYCOL 3350 17 G/17G
17 POWDER, FOR SOLUTION ORAL DAILY PRN
Status: DISCONTINUED | OUTPATIENT
Start: 2017-01-01 | End: 2018-01-01 | Stop reason: HOSPADM

## 2017-01-01 RX ORDER — SODIUM CHLORIDE 0.9 % (FLUSH) 0.9 %
10 SYRINGE (ML) INJECTION AS NEEDED
Status: DISCONTINUED | OUTPATIENT
Start: 2017-01-01 | End: 2017-01-01 | Stop reason: HOSPADM

## 2017-01-01 RX ORDER — IPRATROPIUM BROMIDE AND ALBUTEROL SULFATE 2.5; .5 MG/3ML; MG/3ML
3 SOLUTION RESPIRATORY (INHALATION)
Status: DISCONTINUED | OUTPATIENT
Start: 2017-01-01 | End: 2017-01-01 | Stop reason: HOSPADM

## 2017-01-01 RX ORDER — TAMSULOSIN HYDROCHLORIDE 0.4 MG/1
0.4 CAPSULE ORAL DAILY
Status: DISCONTINUED | OUTPATIENT
Start: 2018-01-01 | End: 2018-01-01 | Stop reason: HOSPADM

## 2017-01-01 RX ORDER — LEVOTHYROXINE SODIUM 0.05 MG/1
50 TABLET ORAL
Status: DISCONTINUED | OUTPATIENT
Start: 2017-01-01 | End: 2017-01-01 | Stop reason: HOSPADM

## 2017-01-01 RX ORDER — ISOSORBIDE MONONITRATE 30 MG/1
30 TABLET, EXTENDED RELEASE ORAL
Qty: 30 TABLET | Refills: 0 | Status: SHIPPED | OUTPATIENT
Start: 2017-01-01 | End: 2017-01-01 | Stop reason: SDUPTHER

## 2017-01-01 RX ORDER — TAMSULOSIN HYDROCHLORIDE 0.4 MG/1
0.4 CAPSULE ORAL NIGHTLY
Status: DISCONTINUED | OUTPATIENT
Start: 2017-01-01 | End: 2017-01-01

## 2017-01-01 RX ORDER — MEMANTINE HYDROCHLORIDE 7 MG/1
28 CAPSULE, EXTENDED RELEASE ORAL DAILY
Status: DISCONTINUED | OUTPATIENT
Start: 2017-01-01 | End: 2017-01-01 | Stop reason: CLARIF

## 2017-01-01 RX ORDER — ISOSORBIDE MONONITRATE 30 MG/1
30 TABLET, EXTENDED RELEASE ORAL
Status: DISCONTINUED | OUTPATIENT
Start: 2017-01-01 | End: 2017-01-01 | Stop reason: HOSPADM

## 2017-01-01 RX ORDER — HEPARIN SODIUM 5000 [USP'U]/ML
5000 INJECTION, SOLUTION INTRAVENOUS; SUBCUTANEOUS EVERY 12 HOURS SCHEDULED
Status: DISCONTINUED | OUTPATIENT
Start: 2017-01-01 | End: 2017-01-01 | Stop reason: HOSPADM

## 2017-01-01 RX ORDER — DONEPEZIL HYDROCHLORIDE 10 MG/1
20 TABLET, FILM COATED ORAL NIGHTLY
Status: DISCONTINUED | OUTPATIENT
Start: 2017-01-01 | End: 2018-01-01 | Stop reason: HOSPADM

## 2017-01-01 RX ORDER — PRAMIPEXOLE DIHYDROCHLORIDE 0.25 MG/1
0.12 TABLET ORAL 3 TIMES DAILY
Status: DISCONTINUED | OUTPATIENT
Start: 2017-01-01 | End: 2017-01-01

## 2017-01-01 RX ORDER — TAMSULOSIN HYDROCHLORIDE 0.4 MG/1
0.4 CAPSULE ORAL NIGHTLY
Status: DISCONTINUED | OUTPATIENT
Start: 2017-01-01 | End: 2017-01-01 | Stop reason: HOSPADM

## 2017-01-01 RX ORDER — ISOSORBIDE MONONITRATE 30 MG/1
TABLET, EXTENDED RELEASE ORAL
Qty: 30 TABLET | Refills: 2 | Status: SHIPPED | OUTPATIENT
Start: 2017-01-01 | End: 2017-01-01 | Stop reason: SDUPTHER

## 2017-01-01 RX ORDER — ISOSORBIDE MONONITRATE 30 MG/1
30 TABLET, EXTENDED RELEASE ORAL DAILY
Qty: 30 TABLET | Refills: 1 | Status: SHIPPED | OUTPATIENT
Start: 2017-01-01 | End: 2017-01-01 | Stop reason: SDUPTHER

## 2017-01-01 RX ORDER — MELATONIN
2000 DAILY
Status: DISCONTINUED | OUTPATIENT
Start: 2017-01-01 | End: 2018-01-01 | Stop reason: HOSPADM

## 2017-01-01 RX ORDER — LEVOTHYROXINE SODIUM 0.05 MG/1
TABLET ORAL
Qty: 90 TABLET | Refills: 0 | OUTPATIENT
Start: 2017-01-01

## 2017-01-01 RX ORDER — ACETAMINOPHEN 325 MG/1
650 TABLET ORAL EVERY 4 HOURS PRN
Status: DISCONTINUED | OUTPATIENT
Start: 2017-01-01 | End: 2018-01-01 | Stop reason: HOSPADM

## 2017-01-01 RX ORDER — AMLODIPINE BESYLATE 2.5 MG/1
2.5 TABLET ORAL DAILY
Qty: 30 TABLET | Refills: 0 | Status: SHIPPED | OUTPATIENT
Start: 2017-01-01 | End: 2017-01-01 | Stop reason: SDUPTHER

## 2017-01-01 RX ORDER — SODIUM CHLORIDE 9 MG/ML
125 INJECTION, SOLUTION INTRAVENOUS CONTINUOUS
Status: DISCONTINUED | OUTPATIENT
Start: 2017-01-01 | End: 2018-01-01

## 2017-01-01 RX ORDER — MEMANTINE HYDROCHLORIDE 10 MG/1
10 TABLET ORAL EVERY 12 HOURS SCHEDULED
Status: DISCONTINUED | OUTPATIENT
Start: 2017-01-01 | End: 2018-01-01 | Stop reason: HOSPADM

## 2017-01-01 RX ORDER — AMLODIPINE BESYLATE 2.5 MG/1
2.5 TABLET ORAL DAILY
Status: DISCONTINUED | OUTPATIENT
Start: 2017-01-01 | End: 2017-01-01 | Stop reason: HOSPADM

## 2017-01-01 RX ORDER — PRAMIPEXOLE DIHYDROCHLORIDE 0.12 MG/1
0.12 TABLET ORAL 3 TIMES DAILY
Qty: 90 TABLET | Refills: 1 | Status: SHIPPED | OUTPATIENT
Start: 2017-01-01 | End: 2017-01-01 | Stop reason: ALTCHOICE

## 2017-01-01 RX ORDER — ASPIRIN 81 MG/1
81 TABLET, CHEWABLE ORAL DAILY
Status: DISCONTINUED | OUTPATIENT
Start: 2017-01-01 | End: 2018-01-01 | Stop reason: HOSPADM

## 2017-01-01 RX ORDER — PRAMIPEXOLE DIHYDROCHLORIDE 0.12 MG/1
0.12 TABLET ORAL 3 TIMES DAILY
Qty: 90 TABLET | Refills: 3 | Status: SHIPPED | OUTPATIENT
Start: 2017-01-01 | End: 2017-01-01

## 2017-01-01 RX ORDER — TAMSULOSIN HYDROCHLORIDE 0.4 MG/1
1 CAPSULE ORAL NIGHTLY
Qty: 90 CAPSULE | Refills: 1 | Status: ON HOLD | OUTPATIENT
Start: 2017-01-01 | End: 2018-01-01

## 2017-01-01 RX ORDER — MELATONIN
1000 DAILY
Status: DISCONTINUED | OUTPATIENT
Start: 2017-01-01 | End: 2017-01-01 | Stop reason: HOSPADM

## 2017-01-01 RX ORDER — SODIUM CHLORIDE 9 MG/ML
125 INJECTION, SOLUTION INTRAVENOUS CONTINUOUS
Status: DISCONTINUED | OUTPATIENT
Start: 2017-01-01 | End: 2017-01-01

## 2017-01-01 RX ORDER — AMLODIPINE BESYLATE 2.5 MG/1
2.5 TABLET ORAL DAILY
Qty: 30 TABLET | Refills: 3 | Status: SHIPPED | OUTPATIENT
Start: 2017-01-01 | End: 2017-01-01 | Stop reason: SDUPTHER

## 2017-01-01 RX ORDER — AMLODIPINE BESYLATE 5 MG/1
5 TABLET ORAL DAILY
Status: DISCONTINUED | OUTPATIENT
Start: 2017-01-01 | End: 2017-01-01

## 2017-01-01 RX ORDER — TAMSULOSIN HYDROCHLORIDE 0.4 MG/1
0.4 CAPSULE ORAL ONCE
Status: COMPLETED | OUTPATIENT
Start: 2017-01-01 | End: 2017-01-01

## 2017-01-01 RX ORDER — DONEPEZIL HYDROCHLORIDE 23 MG/1
23 TABLET, FILM COATED ORAL NIGHTLY
Status: DISCONTINUED | OUTPATIENT
Start: 2017-01-01 | End: 2017-01-01 | Stop reason: HOSPADM

## 2017-01-01 RX ORDER — ACETAMINOPHEN 325 MG/1
650 TABLET ORAL EVERY 4 HOURS PRN
Status: DISCONTINUED | OUTPATIENT
Start: 2017-01-01 | End: 2017-01-01 | Stop reason: HOSPADM

## 2017-01-01 RX ORDER — MIRTAZAPINE 30 MG/1
30 TABLET, FILM COATED ORAL NIGHTLY
Status: DISCONTINUED | OUTPATIENT
Start: 2017-01-01 | End: 2018-01-01 | Stop reason: HOSPADM

## 2017-01-01 RX ORDER — ASPIRIN 81 MG/1
81 TABLET, CHEWABLE ORAL DAILY
Status: DISCONTINUED | OUTPATIENT
Start: 2017-01-01 | End: 2017-01-01 | Stop reason: HOSPADM

## 2017-01-01 RX ADMIN — MEMANTINE HYDROCHLORIDE 10 MG: 10 TABLET, FILM COATED ORAL at 21:05

## 2017-01-01 RX ADMIN — MEMANTINE HYDROCHLORIDE 10 MG: 10 TABLET, FILM COATED ORAL at 22:59

## 2017-01-01 RX ADMIN — RISPERIDONE 1 MG: 1 TABLET ORAL at 22:05

## 2017-01-01 RX ADMIN — TAMSULOSIN HYDROCHLORIDE 0.4 MG: 0.4 CAPSULE ORAL at 21:05

## 2017-01-01 RX ADMIN — MEMANTINE HYDROCHLORIDE 10 MG: 10 TABLET, FILM COATED ORAL at 09:26

## 2017-01-01 RX ADMIN — LEVOTHYROXINE SODIUM 50 MCG: 50 TABLET ORAL at 06:27

## 2017-01-01 RX ADMIN — LEVOTHYROXINE SODIUM 50 MCG: 50 TABLET ORAL at 06:30

## 2017-01-01 RX ADMIN — AMLODIPINE BESYLATE 2.5 MG: 2.5 TABLET ORAL at 22:58

## 2017-01-01 RX ADMIN — LEVOTHYROXINE SODIUM 50 MCG: 50 TABLET ORAL at 06:22

## 2017-01-01 RX ADMIN — DONEPEZIL HYDROCHLORIDE 20 MG: 10 TABLET, FILM COATED ORAL at 22:58

## 2017-01-01 RX ADMIN — SODIUM CHLORIDE 100 ML/HR: 9 INJECTION, SOLUTION INTRAVENOUS at 10:55

## 2017-01-01 RX ADMIN — MEMANTINE HYDROCHLORIDE 10 MG: 10 TABLET, FILM COATED ORAL at 22:05

## 2017-01-01 RX ADMIN — MEMANTINE HYDROCHLORIDE 10 MG: 10 TABLET, FILM COATED ORAL at 10:48

## 2017-01-01 RX ADMIN — VITAMIN D, TAB 1000IU (100/BT) 1000 UNITS: 25 TAB at 09:26

## 2017-01-01 RX ADMIN — SODIUM CHLORIDE 100 ML/HR: 9 INJECTION, SOLUTION INTRAVENOUS at 23:59

## 2017-01-01 RX ADMIN — IPRATROPIUM BROMIDE AND ALBUTEROL SULFATE 3 ML: .5; 3 SOLUTION RESPIRATORY (INHALATION) at 07:30

## 2017-01-01 RX ADMIN — HEPARIN SODIUM 5000 UNITS: 5000 INJECTION, SOLUTION INTRAVENOUS; SUBCUTANEOUS at 09:26

## 2017-01-01 RX ADMIN — VITAMIN D, TAB 1000IU (100/BT) 1000 UNITS: 25 TAB at 08:17

## 2017-01-01 RX ADMIN — DONEPEZIL HYDROCHLORIDE 23 MG: 23 TABLET, FILM COATED ORAL at 23:02

## 2017-01-01 RX ADMIN — ISOSORBIDE MONONITRATE 30 MG: 30 TABLET ORAL at 12:55

## 2017-01-01 RX ADMIN — SODIUM CHLORIDE 125 ML/HR: 9 INJECTION, SOLUTION INTRAVENOUS at 06:59

## 2017-01-01 RX ADMIN — IPRATROPIUM BROMIDE AND ALBUTEROL SULFATE 3 ML: .5; 3 SOLUTION RESPIRATORY (INHALATION) at 22:15

## 2017-01-01 RX ADMIN — HEPARIN SODIUM 5000 UNITS: 5000 INJECTION, SOLUTION INTRAVENOUS; SUBCUTANEOUS at 23:03

## 2017-01-01 RX ADMIN — SODIUM CHLORIDE 125 ML/HR: 9 INJECTION, SOLUTION INTRAVENOUS at 10:46

## 2017-01-01 RX ADMIN — ASPIRIN 81 MG: 81 TABLET, CHEWABLE ORAL at 09:25

## 2017-01-01 RX ADMIN — ASPIRIN 81 MG: 81 TABLET, CHEWABLE ORAL at 08:17

## 2017-01-01 RX ADMIN — IPRATROPIUM BROMIDE AND ALBUTEROL SULFATE 3 ML: .5; 3 SOLUTION RESPIRATORY (INHALATION) at 15:02

## 2017-01-01 RX ADMIN — AMLODIPINE BESYLATE 5 MG: 5 TABLET ORAL at 08:17

## 2017-01-01 RX ADMIN — SODIUM CHLORIDE 1000 ML: 9 INJECTION, SOLUTION INTRAVENOUS at 14:18

## 2017-01-01 RX ADMIN — TAMSULOSIN HYDROCHLORIDE 0.4 MG: 0.4 CAPSULE ORAL at 16:26

## 2017-01-01 RX ADMIN — MIRTAZAPINE 30 MG: 30 TABLET, FILM COATED ORAL at 22:05

## 2017-01-01 RX ADMIN — SODIUM CHLORIDE 125 ML/HR: 9 INJECTION, SOLUTION INTRAVENOUS at 22:59

## 2017-01-01 RX ADMIN — HEPARIN SODIUM 5000 UNITS: 5000 INJECTION, SOLUTION INTRAVENOUS; SUBCUTANEOUS at 21:05

## 2017-01-01 RX ADMIN — ISOSORBIDE MONONITRATE 30 MG: 30 TABLET ORAL at 22:59

## 2017-01-01 RX ADMIN — MIRTAZAPINE 30 MG: 30 TABLET, FILM COATED ORAL at 22:59

## 2017-01-01 RX ADMIN — IPRATROPIUM BROMIDE AND ALBUTEROL SULFATE 3 ML: .5; 3 SOLUTION RESPIRATORY (INHALATION) at 21:01

## 2017-01-01 RX ADMIN — HEPARIN SODIUM 5000 UNITS: 5000 INJECTION, SOLUTION INTRAVENOUS; SUBCUTANEOUS at 08:17

## 2017-01-01 RX ADMIN — IPRATROPIUM BROMIDE AND ALBUTEROL SULFATE 3 ML: .5; 3 SOLUTION RESPIRATORY (INHALATION) at 06:52

## 2017-01-01 RX ADMIN — SODIUM CHLORIDE 125 ML/HR: 9 INJECTION, SOLUTION INTRAVENOUS at 18:33

## 2017-01-01 RX ADMIN — TAZOBACTAM SODIUM AND PIPERACILLIN SODIUM 3.38 G: 375; 3 INJECTION, SOLUTION INTRAVENOUS at 19:55

## 2017-01-01 RX ADMIN — ISOSORBIDE MONONITRATE 30 MG: 30 TABLET ORAL at 09:25

## 2017-01-01 RX ADMIN — DONEPEZIL HYDROCHLORIDE 23 MG: 23 TABLET, FILM COATED ORAL at 21:05

## 2017-01-01 RX ADMIN — TAMSULOSIN HYDROCHLORIDE 0.4 MG: 0.4 CAPSULE ORAL at 23:02

## 2017-01-01 RX ADMIN — DONEPEZIL HYDROCHLORIDE 20 MG: 10 TABLET, FILM COATED ORAL at 22:05

## 2017-01-01 RX ADMIN — SODIUM CHLORIDE 500 ML: 9 INJECTION, SOLUTION INTRAVENOUS at 15:58

## 2017-01-08 DIAGNOSIS — E03.9 HYPOTHYROIDISM: ICD-10-CM

## 2017-01-09 RX ORDER — LEVOTHYROXINE SODIUM 0.05 MG/1
TABLET ORAL
Qty: 90 TABLET | Refills: 0 | OUTPATIENT
Start: 2017-01-09

## 2017-01-10 ENCOUNTER — OFFICE VISIT (OUTPATIENT)
Dept: FAMILY MEDICINE CLINIC | Facility: CLINIC | Age: 77
End: 2017-01-10

## 2017-01-10 VITALS
HEIGHT: 75 IN | SYSTOLIC BLOOD PRESSURE: 97 MMHG | WEIGHT: 164 LBS | BODY MASS INDEX: 20.39 KG/M2 | DIASTOLIC BLOOD PRESSURE: 66 MMHG | RESPIRATION RATE: 16 BRPM | HEART RATE: 75 BPM | TEMPERATURE: 98.2 F

## 2017-01-10 DIAGNOSIS — Z13.6 SCREENING FOR ISCHEMIC HEART DISEASE: ICD-10-CM

## 2017-01-10 DIAGNOSIS — Z23 IMMUNIZATION DUE: ICD-10-CM

## 2017-01-10 DIAGNOSIS — N40.1 BENIGN PROSTATIC HYPERTROPHY (BPH) WITH INCOMPLETE BLADDER EMPTYING: ICD-10-CM

## 2017-01-10 DIAGNOSIS — E03.9 ACQUIRED HYPOTHYROIDISM: Primary | ICD-10-CM

## 2017-01-10 DIAGNOSIS — R39.14 BENIGN PROSTATIC HYPERTROPHY (BPH) WITH INCOMPLETE BLADDER EMPTYING: ICD-10-CM

## 2017-01-10 PROCEDURE — 90670 PCV13 VACCINE IM: CPT | Performed by: FAMILY MEDICINE

## 2017-01-10 PROCEDURE — G0009 ADMIN PNEUMOCOCCAL VACCINE: HCPCS | Performed by: FAMILY MEDICINE

## 2017-01-10 PROCEDURE — 99213 OFFICE O/P EST LOW 20 MIN: CPT | Performed by: FAMILY MEDICINE

## 2017-01-10 RX ORDER — DONEPEZIL HYDROCHLORIDE 23 MG/1
23 TABLET, FILM COATED ORAL NIGHTLY
COMMUNITY

## 2017-01-10 RX ORDER — LEVOTHYROXINE SODIUM 0.05 MG/1
50 TABLET ORAL DAILY
Qty: 90 TABLET | Refills: 1 | Status: SHIPPED | OUTPATIENT
Start: 2017-01-10 | End: 2017-01-01 | Stop reason: SDUPTHER

## 2017-01-10 RX ORDER — MEMANTINE HYDROCHLORIDE 28 MG/1
CAPSULE, EXTENDED RELEASE ORAL
COMMUNITY
Start: 2016-12-29 | End: 2017-01-01 | Stop reason: SDUPTHER

## 2017-01-10 RX ORDER — TAMSULOSIN HYDROCHLORIDE 0.4 MG/1
1 CAPSULE ORAL NIGHTLY
Qty: 90 CAPSULE | Refills: 1 | Status: SHIPPED | OUTPATIENT
Start: 2017-01-10 | End: 2017-01-01 | Stop reason: SDUPTHER

## 2017-01-10 NOTE — MR AVS SNAPSHOT
Alexandro Hallman   1/10/2017 1:00 PM   Office Visit    Provider:  Buster Howe MD   Department:  [facility] FAMILY MEDICINE   Dept Phone:  [phone]                Your Full Care Plan              Where to Get Your Medications      These medications were sent to [facility] - [place], [place] - [address] [place] [address] - [phone]  - [phone]   [address] [facility] [address]     Phone:  [phone]     levothyroxine 50 MCG tablet    tamsulosin 0.4 MG capsule 24 hr capsule            Your Updated Medication List          This list is accurate as of: 1/10/17  1:49 PM.  Always use your most recent med list.                amLODIPine 5 MG tablet   Commonly known as:  NORVASC   Take 1 tablet by mouth Daily for 180 days.       ASMANEX 60 METERED DOSES 220 MCG/INH inhaler   Generic drug:  mometasone       aspirin 81 MG chewable tablet       buPROPion  MG 24 hr tablet   Commonly known as:  WELLBUTRIN XL       cholecalciferol 1000 UNITS tablet   Commonly known as:  VITAMIN D3       donepezil 23 MG tablet   Commonly known as:  ARICEPT       levothyroxine 50 MCG tablet   Commonly known as:  SYNTHROID, LEVOTHROID   Take 1 tablet by mouth Daily for 180 days.       NAMENDA XR 28 MG capsule sustained-release 24 hr extended release capsule   Generic drug:  memantine       polyethylene glycol powder   Commonly known as:  MIRALAX       REMERON 30 MG tablet   Generic drug:  mirtazapine       rotigotine 4 MG/24HR patch   Commonly known as:  NEUPRO   Place 1 patch on the skin Daily for 90 days.       SEREVENT DISKUS 50 MCG/DOSE diskus inhaler   Generic drug:  salmeterol       SPIRIVA HANDIHALER 18 MCG per inhalation capsule   Generic drug:  tiotropium       tamsulosin 0.4 MG capsule 24 hr capsule   Commonly known as:  FLOMAX   Take 1 capsule by mouth Every Night for 180 days.       triamcinolone 0.1 % ointment   Commonly known as:  KENALOG               We Performed the Following     Pneumococcal Conjugate Vaccine 13-Valent All       You Were Diagnosed With        Codes Comments    Acquired hypothyroidism    -  Primary ICD-10-CM: E03.9  ICD-9-CM: 244.9     Benign prostatic hypertrophy (BPH) with incomplete bladder emptying     ICD-10-CM: N40.1, R39.14  ICD-9-CM: 600.01, 788.21     Screening for ischemic heart disease     ICD-10-CM: Z13.6  ICD-9-CM: V81.0     Immunization due     ICD-10-CM: Z23  ICD-9-CM: V05.9       Instructions     None    Patient Instructions History      [employer] Signup     [facility] allows you to send messages to your doctor, view your test results, renew your prescriptions, schedule appointments, and more. To sign up, go to [employer] and click on the Sign Up Now link in the New User? box. Enter your [employer] Activation Code exactly as it appears below along with the last four digits of your Social Security Number and your Date of Birth () to complete the sign-up process. If you do not sign up before the expiration date, you must request a new code.    [employer] Activation Code: [email or web address]  Expires: 2017  1:47 PM    If you have questions, you can email [email or web address] or call [phone] to talk to our [employer] staff. Remember, [employer] is NOT to be used for urgent needs. For medical emergencies, dial 911.               Other Info from Your Visit           Your Appointments     May 12, 2017  3:00 PM EDT   Follow Up with Marielle Braxton MD   [facility] NEUROLOGY (--)    [address]  [facility] [address] [phone]           Arrive 15 minutes prior to appointment.            2017  3:00 PM EDT   Follow Up with Buster Howe MD   [facility] FAMILY MEDICINE (--)    [address]  [facility] [address] [phone]           Arrive 15 minutes prior to appointment.            2017  1:40 PM EST  "  Follow Up with Spenser Borrero MD   [facility] CARDIOLOGY (--)    [address] [address] [address] [phone]           Arrive 15 minutes prior to appointment.              Allergies     Factive [Gemifloxacin]      Fluticasone-salmeterol        Reason for Visit     Hypothyroidism           Vital Signs     Blood Pressure Pulse Temperature Respirations Height Weight    97/66 75 98.2 °F (36.8 °C) (Oral) 16 75\" (190.5 cm) 164 lb (74.4 kg)    Body Mass Index Smoking Status                20.5 kg/m2 Current Every Day Smoker          Problems and Diagnoses Noted     Benign prostatic hypertrophy (BPH) with incomplete bladder emptying    High blood pressure    Underactive thyroid    Screening for ischemic heart disease        Immunization due          "

## 2017-01-10 NOTE — PROGRESS NOTES
"Chief Complaint   Patient presents with   • Hypothyroidism       Subjective   This patient presents the office refill medicines.  Thyroid is controlled on current therapy.  He continues to take tamsulosin for BPH symptoms.  Nocturia is 1-2 times per night.  He remains under the care of neurology for his dementia and Parkinson's.  Recently has a new medicine and he has hallucinations. He may have some side effects from medications.   Review of Systems   Psychiatric/Behavioral:        Hallucinations       Objective   Visit Vitals   • BP 97/66   • Pulse 75   • Temp 98.2 °F (36.8 °C) (Oral)   • Resp 16   • Ht 75\" (190.5 cm)   • Wt 164 lb (74.4 kg)   • BMI 20.5 kg/m2     Body mass index is 20.5 kg/(m^2).  Physical Exam   Constitutional: He is cooperative. No distress.   Eyes: Conjunctivae and lids are normal.   Neck: Carotid bruit is not present. No tracheal deviation present.   Cardiovascular: Normal rate, regular rhythm and normal heart sounds.    No murmur heard.  Pulmonary/Chest: Effort normal and breath sounds normal.   Neurological: He is alert. He is not disoriented.   Skin: Skin is warm and dry.   Psychiatric: He has a normal mood and affect. His speech is normal and behavior is normal. Cognition and memory are impaired.   hallucinations   Vitals reviewed.      Assessment/Plan     Problem List Items Addressed This Visit        Endocrine    Hypothyroidism - Primary    Relevant Medications    levothyroxine (SYNTHROID, LEVOTHROID) 50 MCG tablet    Other Relevant Orders    Comprehensive metabolic panel    CBC and Differential    TSH       Genitourinary    Benign prostatic hypertrophy (BPH) with incomplete bladder emptying    Relevant Medications    tamsulosin (FLOMAX) 0.4 MG capsule 24 hr capsule    Other Relevant Orders    PSA      Other Visit Diagnoses     Screening for ischemic heart disease        Relevant Orders    Lipid panel    Immunization due        Relevant Orders    Pneumococcal Conjugate Vaccine 13-Valent " All          Outpatient Encounter Prescriptions as of 1/10/2017   Medication Sig Dispense Refill   • amLODIPine (NORVASC) 5 MG tablet Take 1 tablet by mouth Daily for 180 days. 90 tablet 3   • ASMANEX 60 METERED DOSES 220 MCG/INH inhaler      • aspirin 81 MG chewable tablet Chew 81 mg daily.     • buPROPion XL (WELLBUTRIN XL) 300 MG 24 hr tablet Take by mouth daily.       • cholecalciferol (VITAMIN D3) 1000 UNITS tablet Take 2,000 Units by mouth 2 (two) times a day.     • donepezil (ARICEPT) 23 MG tablet Take 23 mg by mouth Every Night.     • mirtazapine (REMERON) 30 MG tablet Take 30 mg by mouth Every Night.     • polyethylene glycol (GLYCOLAX) powder Take  by mouth daily.     • rotigotine (NEUPRO) 4 MG/24HR patch Place 1 patch on the skin Daily for 90 days. 90 patch 3   • salmeterol (SEREVENT DISKUS) 50 MCG/DOSE diskus inhaler Serevent Diskus 50 MCG/DOSE Inhalation Aerosol Powder Breath Activated; Patient Sig: Serevent Diskus 50 MCG/DOSE Inhalation Aerosol Powder Breath Activated ; 0; 13-Mar-2015; Active     • tiotropium (SPIRIVA HANDIHALER) 18 MCG per inhalation capsule Spiriva HandiHaler 18 MCG Inhalation Capsule; Patient Sig: Spiriva HandiHaler 18 MCG Inhalation Capsule ; 0; 13-Mar-2015; Active     • triamcinolone (KENALOG) 0.1 % ointment      • levothyroxine (SYNTHROID, LEVOTHROID) 50 MCG tablet Take 1 tablet by mouth Daily for 180 days. 90 tablet 1   • NAMENDA XR 28 MG capsule sustained-release 24 hr extended release capsule      • tamsulosin (FLOMAX) 0.4 MG capsule 24 hr capsule Take 1 capsule by mouth Every Night for 180 days. 90 capsule 1   • [DISCONTINUED] levothyroxine (SYNTHROID, LEVOTHROID) 50 MCG tablet Take 1 tablet by mouth daily for 180 days. 90 tablet 1   • [DISCONTINUED] tamsulosin (FLOMAX) 0.4 MG capsule 24 hr capsule Take 1 capsule by mouth every night for 180 days. 90 capsule 1     No facility-administered encounter medications on file as of 1/10/2017.        Orders Placed This Encounter    Procedures   • Pneumococcal Conjugate Vaccine 13-Valent All   • Comprehensive metabolic panel     Standing Status:   Future     Standing Expiration Date:   10/7/2017   • TSH     Standing Status:   Future     Standing Expiration Date:   10/7/2017   • PSA     Standing Status:   Future     Standing Expiration Date:   10/7/2017   • Lipid panel     Standing Status:   Future     Standing Expiration Date:   10/7/2017       Continue with current treatment plan.         F/U in 6 months

## 2017-01-31 ENCOUNTER — TELEPHONE (OUTPATIENT)
Dept: NEUROLOGY | Facility: CLINIC | Age: 77
End: 2017-01-31

## 2017-02-03 NOTE — TELEPHONE ENCOUNTER
Called the patient's wife and talk to her and patient is off of Wellbutrin and will wait until Monday and if no improvement in his symptoms and told his wife to call me and will discontinue neupro patch.

## 2017-04-04 ENCOUNTER — OFFICE VISIT (OUTPATIENT)
Dept: NEUROLOGY | Facility: CLINIC | Age: 77
End: 2017-04-04

## 2017-04-04 VITALS
DIASTOLIC BLOOD PRESSURE: 64 MMHG | SYSTOLIC BLOOD PRESSURE: 100 MMHG | OXYGEN SATURATION: 100 % | BODY MASS INDEX: 21.14 KG/M2 | WEIGHT: 170 LBS | HEIGHT: 75 IN | HEART RATE: 70 BPM

## 2017-04-04 DIAGNOSIS — G30.9 ALZHEIMER'S DEMENTIA WITH BEHAVIORAL DISTURBANCE, UNSPECIFIED TIMING OF DEMENTIA ONSET: ICD-10-CM

## 2017-04-04 DIAGNOSIS — F02.81 ALZHEIMER'S DEMENTIA WITH BEHAVIORAL DISTURBANCE, UNSPECIFIED TIMING OF DEMENTIA ONSET: ICD-10-CM

## 2017-04-04 DIAGNOSIS — G20 PARKINSON'S DISEASE (HCC): ICD-10-CM

## 2017-04-04 DIAGNOSIS — R26.81 GAIT INSTABILITY: ICD-10-CM

## 2017-04-04 PROCEDURE — 99214 OFFICE O/P EST MOD 30 MIN: CPT | Performed by: PSYCHIATRY & NEUROLOGY

## 2017-04-04 NOTE — PROGRESS NOTES
Subjective   Alexandro Hallman is a 77 y.o. male with history of Alzheimer's dementia with Parkinson's disease and gait instability with spinal stenosis came for follow-up appointment.    History of Present Illness   He was accompanied by his wife and according to them, he has fluctuations in the memory but denies forgetting familiar names and patient is currently not driving and complaint with the medication donepezil and Namenda and denies any side effects.  Having hallucinations almost every day and having agitation and frustration episodes intermittently.  Complaining of resting tremor and having shuffling gait when walking and denies any falls and complaint with the medication neupro patch but having side effects.  Tried carbidopa/levodopa and the past and had side effects and discontinued.  Denies any headaches, blurred vision, double vision, weakness, tingling numbness, dizziness, passing out spells or recent hospitalizations since last visit.  Denies any major depression or anxiety issues.    The following portions of the patient's history were reviewed and updated as appropriate: allergies, current medications, past family history, past medical history, past social history, past surgical history and problem list.    Review of Systems   Constitutional: Positive for fatigue. Negative for chills and fever.   HENT: Negative for hearing loss, tinnitus and trouble swallowing.    Eyes: Positive for pain and visual disturbance. Negative for redness.   Respiratory: Positive for shortness of breath. Negative for cough and wheezing.    Cardiovascular: Negative for chest pain, palpitations and leg swelling.   Gastrointestinal: Negative for constipation, diarrhea, nausea and vomiting.   Endocrine: Negative for cold intolerance and heat intolerance.   Genitourinary: Negative for decreased urine volume, difficulty urinating and urgency.   Musculoskeletal: Positive for gait problem. Negative for back pain, neck pain and  neck stiffness.   Skin: Negative for color change, rash and wound.   Allergic/Immunologic: Negative for environmental allergies and food allergies.   Neurological: Positive for tremors and weakness. Negative for dizziness, light-headedness, numbness and headaches.   Hematological: Negative for adenopathy. Does not bruise/bleed easily.   Psychiatric/Behavioral: Positive for confusion, hallucinations and sleep disturbance. The patient is nervous/anxious.        Objective   Physical Exam   Vitals reviewed.    GENERAL EXAMINATION : Patient is well-developed, well-nourished, well-groomed, alert and approriate in no apparent distress.  HEENT: Normocephalic, normal funduscopic exam, no visible oral lesions.  NECK : Normal range of motion, no tenderness, no malalignment.  CARDIAC : Regular rate and rhythm, no murmurs.  CHEST : Clear to auscultation, bilateral.   No wheezing .  ABDOMEN : Soft, non tender and non distended. EXTREMITIES: No edema. SKIN : No rashes or lesions visible. MUSCULOSKELETAL : No muscle weakness or muscle pain. No joint pains. PSYCHIATRIC : Awake and follwoing verbal commands well.     NEUROLOGICAL EXAMINATION  :  Higher integrative functions : No aphasia or dysarthria.  CRANIAL NERVES : Cranial nerve II: Normal visual acuity and visual fields.  On funduscopic exam, discs are flat with sharp margins cranial nerves III, IV, VI: Extraocular movements are full without nystagmus; pupils are equal, round and reactive to light.  Cranial nerve V: Normal facial sensation and strength of muscles of mastication.  Cranial nerve: VII: Facial movements are symmetric.  No weakness.  Cranial nerve VIII: Auditory acuity is normal.  Cranial nerve IX, X: Symmetric palate movement.  Cranial nerve XI sternocleidomastoid and trapezius are normal.  Cranial nerve XII: Tongue in the midline with no atrophy or fasciculations.      MOTOR : Normal muscle strength except for mild resting tremor with cogwheel rigidity and  decreased arm swing.  SENSATION : Normal to light touch, pinprick, vibration sensations intact and Romberg is negative.  MUSCLE STRETCH REFLEXES : Normal and symmetric in the upper extremities and lower extremities and the plantar responses are flexor bilaterally. COORDINATION : Finger to nose test showed no dysmetria.  Rapid alternating movements are normal.   GAIT : Walks on heels, toes, except for mild difficulty with tandem walk.    Assessment/Plan   Alexandro was seen today for alzheimer's disease and dementia.    Diagnoses and all orders for this visit:    Alzheimer's dementia with behavioral disturbance, unspecified timing of dementia onset    Parkinson's disease  -     Ambulatory Referral to Physical Therapy Evaluate and treat    Gait instability  -     Ambulatory Referral to Physical Therapy Evaluate and treat     77-year-old right-handed white male with history of Alzheimer's dementia with Parkinson's disease and gait instability with spinal stenosis came for follow-up appointment.  On exam, no new focal deficits were seen except for mild resting tremor with cogwheel rigidity and decreased arm swing and mild difficulty with tandem walk.  Discussed with the patient and his wife regarding his signs and symptoms and told him to discontinue neupro patch and told him to call me in 2 weeks and will consider starting pramipexole on amantadine in future for worsening of Parkinson symptoms.  Discussed about fall precautions and ordered for physical therapy to help with the balance and gait.  Told him to continue donepezil 23 mg by mouth daily and Namenda XR 28 mg by mouth daily and discussed about side effects.  Discussed about brain exercises to help with the memory issues.  Will follow-up in 3 months or earlier if problems arise.    Thank you for allowing me to participate in the care of the patient.  Please feel free to call for any questions at your convenience.    Yours sincerely,    Marielle Braxton M.D

## 2017-04-18 PROBLEM — R77.8 ELEVATED TROPONIN: Status: ACTIVE | Noted: 2017-01-01

## 2017-04-18 PROBLEM — R53.1 WEAKNESS: Status: ACTIVE | Noted: 2017-01-01

## 2017-04-18 NOTE — PLAN OF CARE
Problem: Fall Risk (Adult)  Goal: Identify Related Risk Factors and Signs and Symptoms  Outcome: Ongoing (interventions implemented as appropriate)  Goal: Absence of Falls  Outcome: Ongoing (interventions implemented as appropriate)    Problem: Patient Care Overview (Adult)  Goal: Plan of Care Review  Outcome: Ongoing (interventions implemented as appropriate)    04/18/17 4085   Coping/Psychosocial Response Interventions   Plan Of Care Reviewed With patient;spouse   Outcome Evaluation   Outcome Summary/Follow up Plan pt admitted to unit, awaiting MD orders.       Goal: Adult Individualization and Mutuality  Outcome: Ongoing (interventions implemented as appropriate)  Goal: Discharge Needs Assessment  Outcome: Ongoing (interventions implemented as appropriate)    Problem: Mobility, Physical Impaired (Adult)  Goal: Identify Related Risk Factors and Signs and Symptoms  Outcome: Ongoing (interventions implemented as appropriate)  Goal: Enhanced Mobility Skills  Outcome: Ongoing (interventions implemented as appropriate)  Goal: Enhanced Functionality Ability  Outcome: Ongoing (interventions implemented as appropriate)

## 2017-04-18 NOTE — ED NOTES
"Per family pt unable to move. \"He's very weak.\" Pt reportedly had trouble walking the last few days. Pt reportedly felt ill yesterday. Pt reportedly felt better yesterday than today.  Pt reportedly has had a cough for days. Pt with hx COPD and smokes. Family reports decline in appetite and significant change in activity tolerance.     Courtney Aquino RN  04/18/17 7364    "

## 2017-04-18 NOTE — ED PROVIDER NOTES
EMERGENCY DEPARTMENT ENCOUNTER    CHIEF COMPLAINT  Chief Complaint: generalized weakness  History given by: patient, spouse  History limited by: dementia  Room Number: 25/25  PMD: Buster Howe MD      HPI:  Pt is a 77 y.o. male w/ hx of Parkinson's who presents complaining per spouse of generalized weakness onset 1 day ago. Spouse states pt has had difficulty ambulating today. Pt also c/o cough, difficulty urinating, decreased appetite per spouse but denies vomiting, diarrhea, incontinence, any pain. Pt is currently not taking any medication for his Parkinson's after Neupro was stopped by his neurologist 2 weeks ago due to side effects. Has a long Hx of unsteady gait from Parkinson disease. Worse this am.  Pt is a smoker and has hx of COPD. Denies any fever or chest pain or SOB.     Neurologist - Dr. Braxton    Duration:  1 day  Onset: gradual  Timing: constant  Location: generalized  Radiation: n/a  Quality: not described  Intensity/Severity: moderate  Progression: unchanged  Associated Symptoms: cough, difficulty urinating, decreased appetite, difficulty ambulating  Aggravating Factors: none  Alleviating Factors: none  Previous Episodes: hx of Parkinson's  Treatment before arrival: none    PAST MEDICAL HISTORY  Active Ambulatory Problems     Diagnosis Date Noted   • Alzheimer's type dementia 11/18/2015   • Central spinal stenosis 12/10/2015   • Essential hypertension 01/11/2016   • Hypothyroidism 01/11/2016   • Benign prostatic hypertrophy (BPH) with incomplete bladder emptying 01/11/2016   • Communicating hydrocephalus 07/12/2016   • Parkinson's disease 09/13/2016   • Gait instability 09/13/2016     Resolved Ambulatory Problems     Diagnosis Date Noted   • Viral syndrome 11/18/2015   • Abnormal thyroid blood test 12/10/2015   • Elevated brain natriuretic peptide (BNP) level 12/10/2015   • Leg weakness, bilateral 12/10/2015   • Localized edema 12/10/2015   • Hospital discharge follow-up 12/10/2015     Past  Medical History:   Diagnosis Date   • Abnormal TSH 12/2008   • Alzheimer's dementia    • Anorexia 02/28/2008   • Arthritis    • Arthritis of shoulder 04/01/2014   • Arthropathy of pelvic region and thigh    • BPH (benign prostatic hyperplasia)    • Bursitis    • Cholelithiasis 03/27/2008   • Colonic polyp    • Communicating hydrocephalus    • Constipation    • Contusion of thigh, left 02/19/2014   • COPD (chronic obstructive pulmonary disease)    • DDD (degenerative disc disease), cervical    • Dementia    • Depression 05/02/2012   • Diverticulosis    • Eczema    • Glossitis 09/17/2009   • Hypertension 12/03/2007   • Hypothyroidism 03/14/2011   • Ingrown nail 12/2007   • Lumbar stenosis    • Lumbar stenosis    • Pneumonia    • Right shoulder tendonitis 02/29/2014       PAST SURGICAL HISTORY  Past Surgical History:   Procedure Laterality Date   • BACK SURGERY     • CATARACT EXTRACTION, BILATERAL     • COLONOSCOPY  11/04/2011   • FOOT SURGERY Left 1970    spur repair   • FOOT SURGERY Right 1993    spur   • HERNIA REPAIR  1969    double hernia repair   • KNEE SURGERY  1967    Left Knee   • SPINE SURGERY  1997    disc repair       FAMILY HISTORY  Family History   Problem Relation Age of Onset   • Heart disease Mother    • Liver cancer Mother    • Hypertension Mother    • Cancer Mother    • Coronary artery disease Father    • Dementia Father    • Heart disease Father    • Stroke Maternal Grandfather    • Alzheimer's disease Other        SOCIAL HISTORY  Social History     Social History   • Marital status:      Spouse name: N/A   • Number of children: N/A   • Years of education: N/A     Occupational History   • Not on file.     Social History Main Topics   • Smoking status: Current Every Day Smoker   • Smokeless tobacco: Not on file   • Alcohol use No   • Drug use: No   • Sexual activity: No     Other Topics Concern   • Not on file     Social History Narrative       ALLERGIES  Factive [gemifloxacin] and  Fluticasone-salmeterol    REVIEW OF SYSTEMS  Review of Systems   Constitutional: Positive for appetite change (decreased). Negative for chills and fever.   HENT: Negative for congestion and sore throat.    Eyes: Negative.    Respiratory: Positive for cough. Negative for shortness of breath.    Cardiovascular: Negative for chest pain and leg swelling.   Gastrointestinal: Negative for abdominal pain, diarrhea and vomiting.   Genitourinary: Positive for difficulty urinating. Negative for dysuria.   Musculoskeletal: Positive for gait problem. Negative for back pain and neck pain.   Skin: Negative for rash and wound.   Allergic/Immunologic: Negative.    Neurological: Positive for weakness (generalized). Negative for dizziness, numbness and headaches.   Psychiatric/Behavioral: Negative.    All other systems reviewed and are negative.      PHYSICAL EXAM  ED Triage Vitals   Temp Heart Rate Resp BP SpO2   04/18/17 1257 04/18/17 1257 04/18/17 1302 04/18/17 1302 04/18/17 1257   99.6 °F (37.6 °C) 75 16 132/98 95 %      Temp src Heart Rate Source Patient Position BP Location FiO2 (%)   04/18/17 1257 04/18/17 1257 -- -- --   Tympanic Monitor          Physical Exam   Constitutional: He is well-developed, well-nourished, and in no distress. No distress.   Elderly, frail appearing.   HENT:   Head: Normocephalic and atraumatic.   Eyes: EOM are normal. Pupils are equal, round, and reactive to light.   Neck: Normal range of motion. Neck supple.   Cardiovascular: Normal rate, regular rhythm, normal heart sounds and intact distal pulses.    Pulmonary/Chest: Effort normal and breath sounds normal. No respiratory distress.   Abdominal: Soft. There is no tenderness. There is no rebound and no guarding.   Musculoskeletal: Normal range of motion. He exhibits no edema.   Neurological: He is alert. He has normal sensation.   Skin: Skin is warm and dry.   Psychiatric: Mood and affect normal.   Nursing note and vitals reviewed.      LAB  RESULTS  Lab Results (last 24 hours)     Procedure Component Value Units Date/Time    CBC & Differential [[id number]] Collected:  04/18/17 1317    Specimen:  Blood Updated:  04/18/17 1340    Narrative:       The following orders were created for panel order CBC & Differential.  Procedure                               Abnormality         Status                     ---------                               -----------         ------                     CBC Auto Differential[[id number]]         Abnormal            Final result                 Please view results for these tests on the individual orders.    Comprehensive Metabolic Panel [[id number]]  (Abnormal) Collected:  04/18/17 1317    Specimen:  Blood Updated:  04/18/17 1401     Glucose 88 mg/dL      BUN 14 mg/dL      Creatinine 1.22 mg/dL      Sodium 138 mmol/L      Potassium 4.3 mmol/L      Chloride 99 mmol/L      CO2 26.1 mmol/L      Calcium 8.9 mg/dL      Total Protein 6.5 g/dL      Albumin 3.80 g/dL      ALT (SGPT) 13 U/L      AST (SGOT) 21 U/L      Alkaline Phosphatase 133 (H) U/L      Total Bilirubin 0.8 mg/dL      eGFR Non African Amer 57 (L) mL/min/1.73      Globulin 2.7 gm/dL      A/G Ratio 1.4 g/dL      BUN/Creatinine Ratio 11.5     Anion Gap 12.9 mmol/L     Narrative:       The MDRD GFR formula is only valid for adults with stable renal function between ages 18 and 70.    Troponin [[id number]]  (Abnormal) Collected:  04/18/17 1317    Specimen:  Blood Updated:  04/18/17 1411     Troponin T 0.045 (H) ng/mL     Narrative:       Troponin T Reference Ranges:  Less than 0.03 ng/mL:    Negative for AMI  0.03 to 0.09 ng/mL:      Indeterminant for AMI  Greater than 0.09 ng/mL: Positive for AMI    Magnesium [[id number]]  (Normal) Collected:  04/18/17 1317    Specimen:  Blood Updated:  04/18/17 1401     Magnesium 2.1 mg/dL     CBC Auto Differential [[id number]]  (Abnormal) Collected:  04/18/17 1317    Specimen:  Blood Updated:  04/18/17 1340     WBC 12.45 (H) 10*3/mm3      RBC  4.58 (L) 10*6/mm3      Hemoglobin 13.2 (L) g/dL      Hematocrit 40.4 %      MCV 88.2 fL      MCH 28.8 pg      MCHC 32.7 g/dL      RDW 14.0 %      RDW-SD 45.2 fl      MPV 9.1 fL      Platelets 253 10*3/mm3      Neutrophil % 80.1 (H) %      Lymphocyte % 9.6 (L) %      Monocyte % 9.6 %      Eosinophil % 0.2 (L) %      Basophil % 0.2 %      Immature Grans % 0.3 %      Neutrophils, Absolute 9.98 (H) 10*3/mm3      Lymphocytes, Absolute 1.19 10*3/mm3      Monocytes, Absolute 1.19 10*3/mm3      Eosinophils, Absolute 0.02 10*3/mm3      Basophils, Absolute 0.03 10*3/mm3      Immature Grans, Absolute 0.04 (H) 10*3/mm3     BNP [[id number]]  (Normal) Collected:  04/18/17 1317    Specimen:  Blood Updated:  04/18/17 1513     proBNP 778.0 pg/mL     Narrative:       Among patients with dyspnea, NT-proBNP is highly sensitive for the detection of acute congestive heart failure. In addition NT-proBNP of <300 pg/ml effectively rules out acute congestive heart failure with 99% negative predictive value.    POC Glucose Fingerstick [[id number]]  (Normal) Collected:  04/18/17 1524    Specimen:  Blood Updated:  04/18/17 1526     Glucose 92 mg/dL     Narrative:       Meter: [id number] : [id number] Kenia Valdez        I ordered the above labs and reviewed the results      RADIOLOGY  XR Chest 1 View   Final Result      CT Head Without Contrast    (Results Pending)     I ordered the above noted radiological studies. Interpreted by radiologist. Discussed with radiologist (Dr. Cuevas). Reviewed by me in PACS.       PROCEDURES  Procedures  EKG           EKG time: 1503  Rhythm/Rate: NSR rate of 57  P waves and [place]: normal  QRS, axis: normal, Q waves anterior septal leads  ST and T waves: non-specific ST and T wave changes     Interpreted Contemporaneously by me, independently viewed  Unchanged compared to prior      PROGRESS AND CONSULTS  ED Course     1524  Ordered CT head for further evaluation.    1536  Pt is able to ambulate to the restroom w/  assistance. Not ambulating at baseline.   Placed call to [facility] to admit pt.    1622  Call returned from Dr. Soriano ([facility]) who agrees to admit pt.    1635  Rechecked pt who is resting comfortably. D/w pt plan to admit pt. Pt understands and agrees with the plan. All questions answered.      MEDICAL DECISION MAKING  Results were reviewed/discussed with the patient and they were also made aware of online access. Pt also made aware that some labs, such as cultures, will not be resulted during ER visit and follow up with PMD is necessary.     MDM  Number of Diagnoses or Management Options  Elevated troponin:   Parkinson disease:   Progressive weakness:      Amount and/or Complexity of Data Reviewed  Clinical lab tests: ordered and reviewed (Troponin = 0.045)  Tests in the radiology section of CPT®: reviewed and ordered (CT head shows nothing acute.)  Tests in the medicine section of CPT®: ordered and reviewed (See EKG procedure note.)  Decide to obtain previous medical records or to obtain history from someone other than the patient: yes  Obtain history from someone other than the patient: yes  Review and summarize past medical records: yes (Pt has hx of dementia w/ behavioral disturbance, parkinson's, gait instability. Pt saw Dr. Braxton 4/4/17 and she writes:   Alexandro Hallman is a 77 y.o. male with history of Alzheimer's dementia with Parkinson's disease and gait instability with spinal stenosis came for follow-up appointment.     History of Present Illness   He was accompanied by his wife and according to them, he has fluctuations in the memory but denies forgetting familiar names and patient is currently not driving and complaint with the medication donepezil and Namenda and denies any side effects. Having hallucinations almost every day and having agitation and frustration episodes intermittently. Complaining of resting tremor and having shuffling gait when walking and denies any falls and complaint with the  medication neupro patch but having side effects. Tried carbidopa/levodopa and the past and had side effects and discontinued. Denies any headaches, blurred vision, double vision, weakness, tingling numbness, dizziness, passing out spells or recent hospitalizations since last visit. Denies any major depression or anxiety issues.     The following portions of the patient's history were reviewed and updated as appropriate: allergies, current medications, past family history, past medical history, past social history, past surgical history and problem list.     Review of Systems   Constitutional: Positive for fatigue. Negative for chills and fever.   HENT: Negative for hearing loss, tinnitus and trouble swallowing.   Eyes: Positive for pain and visual disturbance. Negative for redness.   Respiratory: Positive for shortness of breath. Negative for cough and wheezing.   Cardiovascular: Negative for chest pain, palpitations and leg swelling.   Gastrointestinal: Negative for constipation, diarrhea, nausea and vomiting.   Endocrine: Negative for cold intolerance and heat intolerance.   Genitourinary: Negative for decreased urine volume, difficulty urinating and urgency.   Musculoskeletal: Positive for gait problem. Negative for back pain, neck pain and neck stiffness.   Skin: Negative for color change, rash and wound.   Allergic/Immunologic: Negative for environmental allergies and food allergies.   Neurological: Positive for tremors and weakness. Negative for dizziness, light-headedness, numbness and headaches.   Hematological: Negative for adenopathy. Does not bruise/bleed easily.   Psychiatric/Behavioral: Positive for confusion, hallucinations and sleep disturbance. The patient is nervous/anxious.            Objective       Physical Exam   Vitals reviewed.     GENERAL EXAMINATION : Patient is well-developed, well-nourished, well-groomed, alert and approriate in no apparent distress. HEENT: Normocephalic, normal  funduscopic exam, no visible oral lesions. NECK : Normal range of motion, no tenderness, no malalignment. CARDIAC : Regular rate and rhythm, no murmurs. CHEST : Clear to auscultation, bilateral. No wheezing . ABDOMEN : Soft, non tender and non distended. EXTREMITIES: No edema. SKIN : No rashes or lesions visible. MUSCULOSKELETAL : No muscle weakness or muscle pain. No joint pains. PSYCHIATRIC : Awake and follwoing verbal commands well.      NEUROLOGICAL EXAMINATION : Higher integrative functions : No aphasia or dysarthria. CRANIAL NERVES : Cranial nerve II: Normal visual acuity and visual fields. On funduscopic exam, discs are flat with sharp margins cranial nerves III, IV, VI: Extraocular movements are full without nystagmus; pupils are equal, round and reactive to light. Cranial nerve V: Normal facial sensation and strength of muscles of mastication. Cranial nerve: VII: Facial movements are symmetric. No weakness. Cranial nerve VIII: Auditory acuity is normal. Cranial nerve IX, X: Symmetric palate movement. Cranial nerve XI sternocleidomastoid and trapezius are normal. Cranial nerve XII: Tongue in the midline with no atrophy or fasciculations.      MOTOR : Normal muscle strength except for mild resting tremor with cogwheel rigidity and decreased arm swing. SENSATION : Normal to light touch, pinprick, vibration sensations intact and Romberg is negative. MUSCLE STRETCH REFLEXES : Normal and symmetric in the upper extremities and lower extremities and the plantar responses are flexor bilaterally. COORDINATION : Finger to nose test showed no dysmetria. Rapid alternating movements are normal. GAIT : Walks on heels, toes, except for mild difficulty with tandem walk.        Assessment/Plan       Alxeandro was seen today for alzheimer's disease and dementia.     Diagnoses and all orders for this visit:     Alzheimer's dementia with behavioral disturbance, unspecified timing of dementia onset     Parkinson's disease  -  Ambulatory Referral to Physical Therapy Evaluate and treat     Gait instability  - Ambulatory Referral to Physical Therapy Evaluate and treat      77-year-old right-handed white male with history of Alzheimer's dementia with Parkinson's disease and gait instability with spinal stenosis came for follow-up appointment. On exam, no new focal deficits were seen except for mild resting tremor with cogwheel rigidity and decreased arm swing and mild difficulty with tandem walk. Discussed with the patient and his wife regarding his signs and symptoms and told him to discontinue neupro patch and told him to call me in 2 weeks and will consider starting pramipexole on amantadine in future for worsening of Parkinson symptoms. Discussed about fall precautions and ordered for physical therapy to help with the balance and gait. Told him to continue donepezil 23 mg by mouth daily and Namenda XR 28 mg by mouth daily and discussed about side effects. Discussed about brain exercises to help with the memory issues. Will follow-up in 3 months or earlier if problems arise.     Thank you for allowing me to participate in the care of the patient. Please feel free to call for any questions at your convenience.     Yours sincerely,     Marielle Braxton M.D)  Discuss the patient with other providers: yes (Dr. Soriano, hospitalist, Dr. Cuevas, radiology)  Independent visualization of images, tracings, or specimens: yes           DIAGNOSIS  Final diagnoses:   Progressive weakness   Elevated troponin   Parkinson disease       DISPOSITION  ADMISSION    Discussed treatment plan and reason for admission with pt/family and admitting physician.  Pt/family voiced understanding of the plan for admission for further testing/treatment as needed.       Latest Documented Vital Signs:  As of 5:27 PM  BP- 136/71 HR- 86 Temp- 98.9 °F (37.2 °C) (Tympanic) O2 sat- 96%    --  Documentation assistance provided by minda Vergara for Dr. Kim.   Information recorded by the scribe was done at my direction and has been verified and validated by me.     Jarvis Vergara  04/18/17 1637       Carlos Kim MD  04/18/17 1721

## 2017-04-19 NOTE — PROGRESS NOTES
Neuro    I believe he is back to baseline.      Cause of decline in ability to walk is unclear but may just be day to day fluctuations that are often greater than expected in dementia with LB that he could have.      Regardless, would check MRI of brain and if negative would not w/o further and could be released from my perspective, if family can handle him.    Would start the Namenda which I believe he was on as out-patient.    CECELIA Oneil MD

## 2017-04-19 NOTE — PROGRESS NOTES
Continued Stay Note  [facility]     Patient Name: Alexandro Hallman  MRN: [id number]  Today's Date: 4/19/2017    Admit Date: 4/18/2017          Discharge Plan       04/19/17 1519    Case Management/Social Work Plan    Plan Home with wife, no needs    Patient/Family In Agreement With Plan yes    Additional Comments S/W patient and wife at bedside and explained CMS NORRIS, received signature.  Plan continues to be to go home, per wife, and continue outpt PT.  Andre, RN, CCP              Discharge Codes     None            Abimbola Quigley RN

## 2017-04-19 NOTE — PLAN OF CARE
Problem: Fall Risk (Adult)  Goal: Identify Related Risk Factors and Signs and Symptoms  Outcome: Ongoing (interventions implemented as appropriate)  Goal: Absence of Falls  Outcome: Ongoing (interventions implemented as appropriate)    Problem: Patient Care Overview (Adult)  Goal: Plan of Care Review  Outcome: Ongoing (interventions implemented as appropriate)    04/19/17 0558   Coping/Psychosocial Response Interventions   Plan Of Care Reviewed With patient   Outcome Evaluation   Outcome Summary/Follow up Plan Patient has had an uneventful shift. New orders received and all consults called in to Neurology and [facility]. Patient started on IV fluids, urine sent. No complaints of pain at this time. Nursing will continue to monitor.   Patient Care Overview   Progress no change       Goal: Adult Individualization and Mutuality  Outcome: Ongoing (interventions implemented as appropriate)  Goal: Discharge Needs Assessment  Outcome: Ongoing (interventions implemented as appropriate)    Problem: Mobility, Physical Impaired (Adult)  Goal: Identify Related Risk Factors and Signs and Symptoms  Outcome: Ongoing (interventions implemented as appropriate)  Goal: Enhanced Mobility Skills  Outcome: Ongoing (interventions implemented as appropriate)  Goal: Enhanced Functionality Ability  Outcome: Ongoing (interventions implemented as appropriate)

## 2017-04-19 NOTE — PROGRESS NOTES
Discharge Planning Assessment  [facility]     Patient Name: Alexandro Hallman  MRN: [id number]  Today's Date: 4/19/2017    Admit Date: 4/18/2017          Discharge Needs Assessment       04/19/17 0841    Living Environment    Lives With spouse    Living Arrangements house   stairs at home    Provides Primary Care For no one    Quality Of Family Relationships supportive    Able to Return to Prior Living Arrangements yes    Discharge Needs Assessment    Concerns To Be Addressed basic needs concerns    Readmission Within The Last 30 Days no previous admission in last 30 days    Equipment Currently Used at Home none   has a walker    Equipment Needed After Discharge none    Transportation Available car;family or friend will provide            Discharge Plan       04/19/17 0842    Case Management/Social Work Plan    Plan Home with wife, no needs    Patient/Family In Agreement With Plan yes    Additional Comments S/W patient and son at bedside and verified face sheet.  IMM noted.  Patient states that he lives at home with his wife and plans to return home.  Patient and son state that he had recently started outpatient PT r/t weakness.  Patient states that he has stairs at home, he needs assistance with and he stays mainly on the first floor.  Patient uses [facility] on [address] and has no problems paying for medications.  Patient has a living will on file, with the hospital.  Will continue to monitor and assist, as needed.  Andre RN, CCP        Discharge Placement     No information found                Demographic Summary       04/19/17 0840    Referral Information    Admission Type inpatient    Arrived From home or self-care    Referral Source admission list    Reason For Consult discharge planning    Record Reviewed plan of care    Contact Information    Permission Granted to Share Information With family/designee   wifeMariam [phone]    Primary Care Physician Information    Name Dr. Goins  Shyam            Functional Status       04/19/17 0841    Functional Status Current    Ambulation 2-->assistive person    Transferring 2-->assistive person    Toileting 2-->assistive person    Bathing 2-->assistive person    Dressing 0-->independent    Eating 0-->independent    Communication 0-->understands/communicates without difficulty    Swallowing (if score 2 or more for any item, consult Rehab Services) 0-->swallows foods/liquids without difficulty    Functional Status Prior    Ambulation 0-->independent    Transferring 0-->independent    Toileting 0-->independent    Bathing 0-->independent    Dressing 0-->independent    Eating 0-->independent    Communication 0-->understands/communicates without difficulty    Swallowing 0-->swallows foods/liquids without difficulty    IADL    Medications assistive person    Meal Preparation assistive person    Housekeeping assistive person    Laundry assistive person    Shopping assistive person    Oral Care independent    Activity Tolerance    Current Activity Limitations none    Cognitive/Perceptual/Developmental    Current Mental Status/Cognitive Functioning no deficits noted            Psychosocial     None            Abuse/Neglect     None            Legal     None            Substance Abuse     None            Patient Forms     None          Abimbola Quigley RN

## 2017-04-19 NOTE — PROGRESS NOTES
"   LOS: 1 day   Patient Care Team:  Buster Howe MD as PCP - General (Family Medicine)  Buster Howe MD as PCP - Family Medicine    Chief Complaint: tired    Subjective     HPI Comments: A little tired today but overall feeling much better. Feels back to baseline as far as strength and mobility are concerned. Was supposed to start outpt PT yesterday anyway.    Fatigue   Associated symptoms include fatigue and weakness. Pertinent negatives include no anorexia, chest pain, coughing, fever, nausea or vomiting.   Shortness of Breath   Pertinent negatives include no chest pain, fever or vomiting.       Subjective:  Symptoms:  Resolved.  He reports weakness.  No shortness of breath, malaise, cough, chest pain, headache, chest pressure, anorexia, diarrhea or anxiety.    Diet:  Adequate intake.  No nausea or vomiting.    Activity level: Normal.    Pain:  He reports no pain.        History taken from: patient chart family    Objective     Vital Signs  Temp:  [97.7 °F (36.5 °C)-99 °F (37.2 °C)] 98.8 °F (37.1 °C)  Heart Rate:  [57-86] 60  Resp:  [12-19] 16  BP: ()/(61-91) 93/61    Objective:  General Appearance:  Comfortable and in no acute distress.    Vital signs: (most recent): Blood pressure 93/61, pulse 60, temperature 98.8 °F (37.1 °C), temperature source Oral, resp. rate 16, height 75\" (190.5 cm), weight 165 lb 8 oz (75.1 kg), SpO2 92 %.  Vital signs are normal.  No fever.    Output: Producing urine and producing stool.    Lungs:  Normal respiratory rate and normal effort.  Breath sounds clear to auscultation.    Heart: Normal rate.  Regular rhythm.    Abdomen: Bowel sounds are normal.   There is no abdominal tenderness.     Extremities: There is no dependent edema.    Pulses: Distal pulses are intact.    Neurological: Patient is alert and oriented to person, place and time.    Skin:  Warm and dry.              Results Review:     I reviewed the patient's new clinical results.  I reviewed the patient's other " test results and agree with the interpretation  Discussed with patient and wife    Medication Review: reviewed    Assessment/Plan     Principal Problem:    Weakness  Active Problems:    Essential hypertension    Hypothyroidism    Parkinson's disease    Gait instability    Elevated troponin      Assessment:  (1. Weakness  2. Abnl UA  3. Indeterminate Trop  4. Leukocytosis  5. Parkinson's (?Lewy Body)  6. Alzheimer's  7. HTN  8. COPD/Tobacco Abuse  9. HypoT4  10. DDD  11. Depression).     Plan:   (Pt back to baseline  Appreciate Neuro input  MRI pending  No further w/u if okay  Namenda restarted  Card input appreciated  Trop normalized and EKGs okay  Can probably go home with wife since back to baseline activity  Repeat UA).       Abhishek Cade MD  04/19/17  2:23 PM    Time: 25min

## 2017-04-19 NOTE — CONSULTS
Neuro  Jossy Oneil MD     NEUROLOGY CONSULTATION     CONSULTING PHYSICIAN:  Attila Oneil MD      REQUESTING PHYSICIAN:  Abhishek Cade MD     CHIEF COMPLAINT:  Gait unsteadiness.      DIAGNOSIS: Parkinson disease.     HISTORY OF PRESENT ILLNESS: This is a 77-year-old man who is followed by Dr. Braxton. She has given him the diagnosis of Parkinson disease along with Alzheimer disease. He was treated with Sinemet but did not tolerate it and is presently on no Parkinson medication. He had been on Neupro but that was recently stopped.     For the dementia he does take donepezil 23 mg daily and Namenda XR 28 mg daily.     The son is here and he sees the patient intermittently. He says that over the last few months he feels the patient is doing better with his cognitive function and with his ambulation. However, on the day of admission the patient was unable to get up out of the bed because of generalized weakness and he was subsequently brought to the hospital. There was no focal weakness noted. There was no change in his cognitive function leading up to the admission. There were no visual bulbar or sphincter complaints. No localizing numbness or weakness in the limbs or face. He has no history of cerebrovascular disease.     His location of the problems is both legs. Severity moderate. Quality is gait instability. Duration is 24 hours before admission. Associated symptom of dementia and this is in the context of a man with Parkinson disease.        PAST MEDICAL HISTORY:  1. Remarkable for the above described neurological problems.   2. He also has BPH communicating hydrocephalus.  3. COPD.   4. Depression.   5. Hypertension.   6. Hypothyroidism.   7. Lumbar spinal stenosis.     ADMISSION MEDICATIONS:   1. Spiriva.   2. Serevent.   3. Bupropion.   4. Flomax.   5. Namenda XR 28 mg daily.   6. Remeron 30 daily.   7. Synthroid.   8. Aricept 23 mg daily.   9. Aspirin 81 daily.   10. Amlodipine.      SOCIAL  HISTORY:  Reveals no alcohol or drug abuse. He lives with his wife.     FAMILY HISTORY: Reviewed and is noncontributory to present illness.     REVIEW OF SYSTEMS: A 14-system review performed and other than the gait difficulty and dementia, all the other systems are negative.     PHYSICAL EXAMINATION:    GENERAL:  Reveals a man who appears stated age, in no acute distress. He was able to sit on the edge and have a reasonable conversation with me.   VITAL SIGNS:  Temperature is 98.1, pulse 58, respirations 16, blood pressure 118/_____.   NECK: Carotids are equal without bruits.   CARDIAC: Reveals regular rate and rhythm with no significant murmur or gallop.   EXTREMITIES:  Nonedematous. Pulses are intact.   SKIN: There is no rash.   ABDOMEN:  There is no hepatosplenomegaly.   LUNGS: There are no labored respirations.   LYMPH NODES:  There is no lymphadenopathy.   NEUROLOGIC:  He is awake, alert and oriented to his name. He knew he was in a hospital. He did not know the name of the hospital. He did know he was in [place], [place] after thinking about it for a while. He said it was May as opposed to April. He said it was 2020 as opposed to 2017. He knew the day of the week. There was no aphasia or dysarthria. Visual fields are full. Discs are flat. Cranial nerves II-XII are intact. Power was normal in all 4 extremities. There is a slight degree of bilateral cognitive rigidity. I did not see any significant rest tremor today and only a minimal postural tremor. His reflexes are 2 and symmetric in the upper extremities. Knees are 1. Ankles are absent. Both toes are downgoing. There is no spasticity in the legs. Sensation is intact to pin and joint position sensed in all 4 extremities. Cerebellar function is intact in all 4 extremities. His gait is really not too bad. He does have decreased arm swing and leans forward somewhat and tends to take short steps but he walked in and out of the room into the hallway without  any assistance. His son thinks his gait is about back to normal.     DIAGNOSTIC DATA:  On reviewing his lab work he had an initial white count of 12.5. It is now normalized at 6.5. Creatinine is 1.1. No other abnormalities are seen.     CT scan of the brain was performed. I reviewed the images. There was some small vessel disease but no other significant abnormalities. In particular there is no evidence of hydrocephalus.     IMPRESSION:   I think it is unlikely that a neurological event occurred here. Patients with Parkinson disease (and especially patients with dementia with Lewy body) do fluctuate more in their performance day to day than the normal population and I suspect that is all we have here. However, the family reports an acute change and therefore I would do an MRI of the brain. Also _____ for a B12 and a TSH. If these are negative I would assume that there has been no significant neurological event and I think he could be released.     In reviewing his medications, he has been put on the donepezil as an inpatient but the Namenda has not been started and therefore I ordered that. I see no reason to change these medications at this point.     Thank you for allowing me to see the patient.

## 2017-04-19 NOTE — H&P
"Internal medicine history and physical    INTERNAL MEDICINE   [facility]       Patient Identification:  Name: Alexandro Hallman  Age: 77 y.o.  Sex: male  :  1940  MRN: [id number]                   Primary Care Physician: Buster Howe MD                                   Chief Complaint:  progressive weakness difficulty walking worse in the last day or so.    History of Present Illness:   Information patient's wife, patient himself has a lot of things to say but he will his wife adamantly says: \"not to pay  attention to him because he is demented\".    Patient is a 77-year-old male who has Parkinson's disease and progressive weakness as well as history of underlying dementia has been declining in the last few months and really bad the last couple days.  Today he couldn't get out of the bed and was unable to take any steps which he could do few days ago.  There is no fever chills nausea vomiting diarrhea abdominal pain.  Patient does say that he has some difficulty urination and according to the wife he has not urinated since morning.  Patient denies any chest pain or shortness of breath.  Past Medical History:  Past Medical History:   Diagnosis Date   • Abnormal TSH 2008   • Alzheimer's dementia    • Anorexia 2008   • Arthritis    • Arthritis of shoulder 2014   • Arthropathy of pelvic region and thigh    • BPH (benign prostatic hyperplasia)    • Bursitis    • Cholelithiasis 2008   • Colonic polyp    • Communicating hydrocephalus    • Constipation    • Contusion of thigh, left 2014   • COPD (chronic obstructive pulmonary disease)    • DDD (degenerative disc disease), cervical    • Dementia     2012   • Depression 2012   • Diverticulosis    • Eczema    • Glossitis 2009   • Hypertension 2007   • Hypothyroidism 2011   • Ingrown nail 2007   • Lumbar stenosis    • Lumbar stenosis    • Pneumonia    • Right shoulder tendonitis  "     Past Surgical History:  Past Surgical History:   Procedure Laterality Date   • BACK SURGERY     • CATARACT EXTRACTION, BILATERAL     • COLONOSCOPY  11/04/2011   • FOOT SURGERY Left 1970    spur repair   • FOOT SURGERY Right 1993    spur   • HERNIA REPAIR  1969    double hernia repair   • KNEE SURGERY  1967    Left Knee   • SPINE SURGERY  1997    disc repair      Home Meds:  Prescriptions Prior to Admission   Medication Sig Dispense Refill Last Dose   • amLODIPine (NORVASC) 5 MG tablet Take 1 tablet by mouth Daily for 180 days. 90 tablet 3 4/18/2017 at Unknown time   • aspirin 81 MG chewable tablet Chew 81 mg daily.   4/18/2017 at Unknown time   • cholecalciferol (VITAMIN D3) 1000 UNITS tablet Take 2,000 Units by mouth 2 (two) times a day.   4/18/2017 at Unknown time   • donepezil (ARICEPT) 23 MG tablet Take 23 mg by mouth Every Night.   4/17/2017 at Unknown time   • levothyroxine (SYNTHROID, LEVOTHROID) 50 MCG tablet Take 1 tablet by mouth Daily for 180 days. 90 tablet 1 4/18/2017 at Unknown time   • mirtazapine (REMERON) 30 MG tablet Take 30 mg by mouth Every Night.   4/17/2017 at Unknown time   • NAMENDA XR 28 MG capsule sustained-release 24 hr extended release capsule    4/18/2017 at Unknown time   • polyethylene glycol (GLYCOLAX) powder Take  by mouth daily.   4/18/2017 at Unknown time   • tamsulosin (FLOMAX) 0.4 MG capsule 24 hr capsule Take 1 capsule by mouth Every Night for 180 days. 90 capsule 1 4/17/2017 at Unknown time   • Umeclidinium-Vilanterol (ANORO ELLIPTA) 62.5-25 MCG/INH aerosol powder  Inhale.   4/18/2017 at Unknown time   • ASMANEX 60 METERED DOSES 220 MCG/INH inhaler    Taking   • buPROPion XL (WELLBUTRIN XL) 300 MG 24 hr tablet Take by mouth daily.     Taking   • salmeterol (SEREVENT DISKUS) 50 MCG/DOSE diskus inhaler Serevent Diskus 50 MCG/DOSE Inhalation Aerosol Powder Breath Activated; Patient Sig: Serevent Diskus 50 MCG/DOSE Inhalation Aerosol Powder Breath Activated ; 0; 13-Mar-2015;  Active   Taking   • tiotropium (SPIRIVA HANDIHALER) 18 MCG per inhalation capsule Spiriva HandiHaler 18 MCG Inhalation Capsule; Patient Sig: Spiriva HandiHaler 18 MCG Inhalation Capsule ; 0; 13-Mar-2015; Active   Taking   • triamcinolone (KENALOG) 0.1 % ointment    Unknown at Unknown time     Current Meds:     Current Facility-Administered Medications:   •  sodium chloride 0.9 % flush 10 mL, 10 mL, Intravenous, PRN, Carlos Kim MD  •  sodium chloride 0.9 % infusion, 125 mL/hr, Intravenous, Continuous, Carlos Kim MD, Last Rate: 125 mL/hr at 04/18/17 1541, 125 mL/hr at 04/18/17 1541  Allergies:  Allergies   Allergen Reactions   • Factive [Gemifloxacin]    • Fluticasone-Salmeterol    • Cefdinir Rash     Social History:   Social History   Substance Use Topics   • Smoking status: Current Every Day Smoker   • Smokeless tobacco: Not on file   • Alcohol use No      Family History:  Family History   Problem Relation Age of Onset   • Heart disease Mother    • Liver cancer Mother    • Hypertension Mother    • Cancer Mother    • Coronary artery disease Father    • Dementia Father    • Heart disease Father    • Stroke Maternal Grandfather    • Alzheimer's disease Other           Review of Systems  See history of present illness and past medical history.  There is limited because patient is demented  Constitutional: Positive for appetite change (decreased).   The wife  disagrees that his appetite is changed Negative for chills and fever.   HENT: Negative for congestion and sore throat.   Eyes: Negative.   Respiratory: Positive for cough. Negative for shortness of breath.   Cardiovascular: Negative for chest pain and leg swelling.   Gastrointestinal: Negative for abdominal pain, diarrhea and vomiting.   Genitourinary: Positive for difficulty urinating. Negative for dysuria.   Musculoskeletal: Positive for gait problem. Negative for back pain and neck pain.   Skin: Negative for rash and wound.   Allergic/Immunologic:  "Negative.   Neurological: Positive for weakness (generalized). Negative for dizziness, numbness and headaches.   Psychiatric/Behavioral: Negative.     Vitals:   /91 (BP Location: Right arm, Patient Position: Lying)  Pulse 62  Temp 99 °F (37.2 °C) (Oral)   Resp 18  Ht 75\" (190.5 cm)  Wt 165 lb 8 oz (75.1 kg)  SpO2 94%  BMI 20.69 kg/m2  I/O:   Intake/Output Summary (Last 24 hours) at 04/18/17 2022  Last data filed at 04/18/17 1558   Gross per 24 hour   Intake             1000 ml   Output                0 ml   Net             1000 ml     Exam:  General Appearance:    Alert, cooperative, no distress, appears stated age   Head:    Normocephalic, without obvious abnormality, atraumatic   Eyes:    PERRL, conjunctiva/corneas clear, EOM's intact, both eyes   Ears:    Normal external ear canals, both ears   Nose:   Nares normal, septum midline, mucosa normal, no drainage    or sinus tenderness   Throat:   Lips, tongue, gums normal; oral mucosa pink and moist   Neck:   Supple, symmetrical, trachea midline, no adenopathy;     thyroid:  no enlargement/tenderness/nodules; no carotid    bruit or JVD   Back:     Symmetric, no curvature, ROM normal, no CVA tenderness   Lungs:     Clear to auscultation bilaterally, respirations unlabored   Chest Wall:    No tenderness or deformity    Heart:    Regular rate and rhythm, S1 and S2 normal, no murmur, rub   or gallop   Abdomen:     Soft, non-tender, bowel sounds active all four quadrants,     no masses, no hepatomegaly, no splenomegaly   Extremities:   Extremities normal, atraumatic, no cyanosis or edema   Pulses:   Pulses palpable in all extremities; symmetric all extremities   Skin:   Skin color normal, Skin is warm and dry,  no rashes or palpable lesions   Neurologic:   grossly nonfocal       Data Review:      I reviewed the patient's new clinical results.    Results from last 7 days  Lab Units 04/18/17  1317   WBC 10*3/mm3 12.45*   HEMOGLOBIN g/dL 13.2*   PLATELETS " 10*3/mm3 253       Results from last 7 days  Lab Units 04/18/17  1317   SODIUM mmol/L 138   POTASSIUM mmol/L 4.3   CHLORIDE mmol/L 99   TOTAL CO2 mmol/L 26.1   BUN mg/dL 14   CREATININE mg/dL 1.22   CALCIUM mg/dL 8.9   GLUCOSE mg/dL 88       Assessment:  Principal Problem:    Weakness  Active Problems:    Essential hypertension    Hypothyroidism    Parkinson's disease    Gait instability    Elevated troponin      Plan:  Follow-up on the urinalysis and urine culture is abnormal provide him with IV antibiotics.  Get neurology evaluation and will some of his sedative medications.  Also will need CCP consultation for placement as his care needs are getting to the point that he may not be able to get help with his current care structure.  Obviously this would require discussion with his wife.  Check serial troponin and cardiology evaluation  Iona Soriano MD   4/18/2017  8:22 PM  Much of this encounter note is an electronic transcription/translation of spoken language to printed text. The electronic translation of spoken language may permit erroneous, or at times, nonsensical words or phrases to be inadvertently transcribed; Although I have reviewed the note for such errors, some may still exist

## 2017-04-19 NOTE — CONSULTS
Patient Name: Alexandro Hallman  :1940  77 y.o.    Date of Admission: 2017  Date of Consultation:  17  Encounter Provider: Spenser Borrero MD  Place of Service: [facility] CARDIOLOGY  Referring Provider: Iona Soriano MD  Patient Care Team:  Buster Howe MD as PCP - General (Family Medicine)  Buster Howe MD as PCP - Family Medicine      Chief complaint: weakness    Reason for consultation: elevated troponin    History of Present Illness:   77-year-old male history of hypertension COPD hypothyroidism and Parkinson's disease who presents for weakness.  Patient's troponin was found to be indeterminate at the 0.04 and then 0.028.  Patient has multiple cardiac risk factors but significant dementia.  Patient also has significant Parkinson's disease.  Her last echocardiogram dates back to  that showed normal function and moderate pulmonary hypertension.  She was admitted for further evaluation.  This morning he has no complaints.  Stress test 2/10/16      Echo 11/24/15      Past Medical History:   Diagnosis Date   • Abnormal TSH 2008   • Alzheimer's dementia    • Anorexia 2008   • Arthritis    • Arthritis of shoulder 2014   • Arthropathy of pelvic region and thigh    • BPH (benign prostatic hyperplasia)    • Bursitis    • Cholelithiasis 2008   • Colonic polyp    • Communicating hydrocephalus    • Constipation    • Contusion of thigh, left 2014   • COPD (chronic obstructive pulmonary disease)    • DDD (degenerative disc disease), cervical    • Dementia     2012   • Depression 2012   • Diverticulosis    • Eczema    • Glossitis 2009   • Hypertension 2007   • Hypothyroidism 2011   • Ingrown nail 2007   • Lumbar stenosis    • Lumbar stenosis    • Pneumonia    • Right shoulder tendonitis        Past Surgical History:   Procedure Laterality Date   • BACK SURGERY     • CATARACT EXTRACTION, BILATERAL      • COLONOSCOPY  11/04/2011   • FOOT SURGERY Left 1970    spur repair   • FOOT SURGERY Right 1993    spur   • HERNIA REPAIR  1969    double hernia repair   • KNEE SURGERY  1967    Left Knee   • SPINE SURGERY  1997    disc repair         Prior to Admission medications    Medication Sig Start Date End Date Taking? Authorizing Provider   amLODIPine (NORVASC) 5 MG tablet Take 1 tablet by mouth Daily for 180 days. 11/10/16 5/9/17 Yes Spenser Borrero MD   aspirin 81 MG chewable tablet Chew 81 mg daily.   Yes Historical Provider, MD   cholecalciferol (VITAMIN D3) 1000 UNITS tablet Take 2,000 Units by mouth 2 (two) times a day.   Yes Historical Provider, MD   donepezil (ARICEPT) 23 MG tablet Take 23 mg by mouth Every Night.   Yes Historical Provider, MD   levothyroxine (SYNTHROID, LEVOTHROID) 50 MCG tablet Take 1 tablet by mouth Daily for 180 days. 1/10/17 7/9/17 Yes Buster Howe MD   mirtazapine (REMERON) 30 MG tablet Take 30 mg by mouth Every Night. 3/13/15  Yes Historical Provider, MD   NAMENDA XR 28 MG capsule sustained-release 24 hr extended release capsule  12/29/16  Yes Historical Provider, MD   polyethylene glycol (GLYCOLAX) powder Take  by mouth daily. 3/13/15  Yes Historical Provider, MD   tamsulosin (FLOMAX) 0.4 MG capsule 24 hr capsule Take 1 capsule by mouth Every Night for 180 days. 1/10/17 7/9/17 Yes Buster Howe MD   Umeclidinium-Vilanterol (ANORO ELLIPTA) 62.5-25 MCG/INH aerosol powder  Inhale.   Yes Historical Provider, MD   ASMANEX 60 METERED DOSES 220 MCG/INH inhaler  10/4/15   Historical Provider, MD   buPROPion XL (WELLBUTRIN XL) 300 MG 24 hr tablet Take by mouth daily.   3/13/15   Historical Provider, MD   salmeterol (SEREVENT DISKUS) 50 MCG/DOSE diskus inhaler Serevent Diskus 50 MCG/DOSE Inhalation Aerosol Powder Breath Activated; Patient Sig: Serevent Diskus 50 MCG/DOSE Inhalation Aerosol Powder Breath Activated ; 0; 13-Mar-2015; Active 3/13/15   Historical Provider, MD   tiotropium (SPIRIVA  HANDIHALER) 18 MCG per inhalation capsule Spiriva HandiHaler 18 MCG Inhalation Capsule; Patient Sig: Spiriva HandiHaler 18 MCG Inhalation Capsule ; 0; 13-Mar-2015; Active 3/13/15   Historical Provider, MD   triamcinolone (KENALOG) 0.1 % ointment  3/1/16   Historical Provider, MD       Allergies   Allergen Reactions   • Factive [Gemifloxacin]    • Fluticasone-Salmeterol    • Cefdinir Rash       Social History     Social History   • Marital status:      Spouse name: N/A   • Number of children: N/A   • Years of education: N/A     Social History Main Topics   • Smoking status: Current Every Day Smoker   • Smokeless tobacco: None   • Alcohol use No   • Drug use: No   • Sexual activity: No     Other Topics Concern   • None     Social History Narrative       Family History   Problem Relation Age of Onset   • Heart disease Mother    • Liver cancer Mother    • Hypertension Mother    • Cancer Mother    • Coronary artery disease Father    • Dementia Father    • Heart disease Father    • Stroke Maternal Grandfather    • Alzheimer's disease Other        REVIEW OF SYSTEMS:   All systems reviewed.  Pertinent positives identified in HPI.  All other systems are negative.      Objective:     Vitals:    04/18/17 2215 04/18/17 2350 04/19/17 0653 04/19/17 0815   BP:  101/82  118/77   BP Location:  Right arm  Right arm   Patient Position:  Lying  Lying   Pulse: 62 62 58 58   Resp: 12 16 16 16   Temp:  97.7 °F (36.5 °C)  98.1 °F (36.7 °C)   TempSrc:    Oral   SpO2: 91% 92% 92% 92%   Weight:       Height:         Body mass index is 20.69 kg/(m^2).    General Appearance:    Alert, cooperative, in no acute distress   Head:    Normocephalic, without obvious abnormality, atraumatic   Eyes:            Lids and lashes normal, conjunctivae and sclerae normal, no   icterus, no pallor, corneas clear, PERRLA   Ears:    Ears appear intact with no abnormalities noted   Throat:   No oral lesions, no thrush, oral mucosa moist   Neck:   No  adenopathy, supple, trachea midline, no thyromegaly, no   carotid bruit, no JVD   Back:     No kyphosis present, no scoliosis present, no skin lesions, erythema or scars, no tenderness to percussion or palpation, range of motion normal   Lungs:     Clear to auscultation,respirations regular, even and unlabored    Heart:    Regular rhythm and normal rate, normal S1 and S2, no murmur, no gallop, no rub, no click   Chest Wall:    No abnormalities observed   Abdomen:     Normal bowel sounds, no masses, no organomegaly, soft        non-tender, non-distended, no guarding, no rebound  tenderness   Extremities:   Moves all extremities well, no edema, no cyanosis, no redness   Pulses:   Pulses palpable and equal bilaterally. Normal radial, carotid, femoral, dorsalis pedis and posterior tibial pulses bilaterally. Normal abdominal aorta   Skin:  Psychiatric:   No bleeding, bruising or rash    Alert and oriented x 3, normal mood and affect   Lab Review:       Results from last 7 days  Lab Units 04/19/17  0406   SODIUM mmol/L 138   POTASSIUM mmol/L 3.7   CHLORIDE mmol/L 103   TOTAL CO2 mmol/L 22.5   BUN mg/dL 17   CREATININE mg/dL 1.11   CALCIUM mg/dL 8.0*   BILIRUBIN mg/dL 0.3   ALK PHOS U/L 104   ALT (SGPT) U/L 10   AST (SGOT) U/L 16   GLUCOSE mg/dL 169*       Results from last 7 days  Lab Units 04/19/17  0406 04/18/17  1317   TROPONIN T ng/mL 0.028 0.045*       Results from last 7 days  Lab Units 04/19/17  0406   WBC 10*3/mm3 6.49   HEMOGLOBIN g/dL 11.9*   HEMATOCRIT % 36.5*   PLATELETS 10*3/mm3 180           Results from last 7 days  Lab Units 04/18/17  1317   MAGNESIUM mg/dL 2.1         Previous EKG 11/10/16                          I personally viewed and interpreted the patient's EKG/Telemetry data.        Assessment and Plan:       1.  Patient presents with unreliable symptoms.  Today he said he really wasn't any issues.  His troponin was in the indeterminate range.  At this point I would take a very conservative  approach.  His EKG is unremarkable his troponins are not elevated.  He does have a history of significant dementia which further working up his heart will not change that.  At this point he remains on aspirin amlodipine.  I would add low-dose Imdur and see how he does clinically.    Spenser Borrero MD  04/19/17  11:11 AM

## 2017-04-19 NOTE — PLAN OF CARE
Problem: Fall Risk (Adult)  Goal: Identify Related Risk Factors and Signs and Symptoms  Outcome: Ongoing (interventions implemented as appropriate)  Goal: Absence of Falls  Outcome: Ongoing (interventions implemented as appropriate)    Problem: Patient Care Overview (Adult)  Goal: Plan of Care Review  Outcome: Ongoing (interventions implemented as appropriate)    04/19/17 2936   Coping/Psychosocial Response Interventions   Plan Of Care Reviewed With patient;spouse   Outcome Evaluation   Outcome Summary/Follow up Plan pt denies pain, unable to obtain urine sample, SR on monitor. Will continue to monitor.       Goal: Adult Individualization and Mutuality  Outcome: Ongoing (interventions implemented as appropriate)  Goal: Discharge Needs Assessment  Outcome: Ongoing (interventions implemented as appropriate)    Problem: Mobility, Physical Impaired (Adult)  Goal: Identify Related Risk Factors and Signs and Symptoms  Outcome: Ongoing (interventions implemented as appropriate)  Goal: Enhanced Mobility Skills  Outcome: Ongoing (interventions implemented as appropriate)  Goal: Enhanced Functionality Ability  Outcome: Ongoing (interventions implemented as appropriate)

## 2017-04-20 PROBLEM — R77.8 ELEVATED TROPONIN: Status: RESOLVED | Noted: 2017-01-01 | Resolved: 2017-01-01

## 2017-04-20 NOTE — PROGRESS NOTES
Continued Stay Note  [facility]     Patient Name: Alexandro Hallman  MRN: [id number]  Today's Date: 4/20/2017    Admit Date: 4/18/2017          Discharge Plan       04/20/17 1157    Case Management/Social Work Plan    Plan Home with wife, no needs    Final Note    Final Note orders to d/c home              Discharge Codes     None        Expected Discharge Date and Time     Expected Discharge Date Expected Discharge Time    Apr 20, 2017             Abimbola Quigley RN

## 2017-04-20 NOTE — PROGRESS NOTES
Hospital Follow Up    LOS:  LOS: 1 day   Patient Name: Alexandro Hallman  Age/Sex: 77 y.o. male  : 1940  MRN: [id number]    Day of Service: 17   Length of Stay: 1  Encounter Provider: Spenser Borrero MD  Place of Service: [facility] CARDIOLOGY  Patient Care Team:  Buster Howe MD as PCP - General (Family Medicine)  Buster Howe MD as PCP - Family Medicine    Subjective:     Chief Complaint:Weakness    Interval History: Patient looks great this morning says he feels good no complaints wants to go home.  Patient denies a types of chest pain shortness of breath.    Objective:     Objective:  Temp:  [98.1 °F (36.7 °C)-98.8 °F (37.1 °C)] 98.2 °F (36.8 °C)  Heart Rate:  [45-89] 83  Resp:  [16-20] 20  BP: ()/(51-72) 80/51     Intake/Output Summary (Last 24 hours) at 17 0950  Last data filed at 17 0849   Gross per 24 hour   Intake              360 ml   Output              100 ml   Net              260 ml     Body mass index is 20.69 kg/(m^2).  Last 3 weights    17  1257 17  1835   Weight: 170 lb (77.1 kg) 165 lb 8 oz (75.1 kg)     Weight change:       Physical Exam:   General : Alert, cooperative, in no acute distress.  Neuro: alert,cooperative and oriented  Lungs: CTAB. Normal respiratory effort and rate.  CV:: Regular rate and rhythm, normal S1 and S2, no murmurs, gallops or rubs.  ABD: Soft, nontender, non-distended. positive bowel sounds  Extr: No edema or cyanosis, moves all extremities    Lab Review:     Results from last 7 days  Lab Units 17  0548 17  0406 17  1317   SODIUM mmol/L 135* 138 138   POTASSIUM mmol/L 4.1 3.7 4.3   CHLORIDE mmol/L 98 103 99   TOTAL CO2 mmol/L 22.3 22.5 26.1   BUN mg/dL 12 17 14   CREATININE mg/dL 0.89 1.11 1.22   GLUCOSE mg/dL 91 169* 88   CALCIUM mg/dL 8.3* 8.0* 8.9   AST (SGOT) U/L  --  16 21   ALT (SGPT) U/L  --  10 13       Results from last 7 days  Lab Units 17  4044  04/18/17  1317   TROPONIN T ng/mL 0.028 0.045*       Results from last 7 days  Lab Units 04/20/17  0548 04/19/17  0406   WBC 10*3/mm3 9.22 6.49   HEMOGLOBIN g/dL 12.3* 11.9*   HEMATOCRIT % 37.7* 36.5*   PLATELETS 10*3/mm3 220 180           Results from last 7 days  Lab Units 04/18/17  1317   MAGNESIUM mg/dL 2.1           Results from last 7 days  Lab Units 04/18/17  1317   PROBNP pg/mL 778.0       Results from last 7 days  Lab Units 04/20/17  0548   TSH mIU/mL 2.440       Current Medications:   Scheduled Meds:  amLODIPine 5 mg Oral Daily   aspirin 81 mg Oral Daily   cholecalciferol 1,000 Units Oral Daily   donepezil 23 mg Oral Nightly   heparin (porcine) 5,000 Units Subcutaneous Q12H   ipratropium-albuterol 3 mL Nebulization TID - RT   isosorbide mononitrate 30 mg Oral Q24H   levothyroxine 50 mcg Oral Q AM   memantine 10 mg Oral Q12H   tamsulosin 0.4 mg Oral Nightly     Continuous Infusions:     Allergies:  Allergies   Allergen Reactions   • Factive [Gemifloxacin]    • Fluticasone-Salmeterol    • Cefdinir Rash       Assessment:     Principal Problem:    Weakness  Active Problems:    Essential hypertension    Hypothyroidism    Parkinson's disease    Gait instability    Elevated troponin        Plan:      1.  Indeterminate troponin.  Took a conservative approach clinically he feels good.  His blood pressure did drop a little bit side would cut his amlodipine down to 2.5 mg a day.  We will continue to follow him clinically.  2.  Dementia  3.  Gait instability abnormal UA.  Issues being addressed by different physicians.  Okay to discharge from my standpoint.    Spenser Borrero MD  04/20/17  9:50 AM

## 2017-04-20 NOTE — PLAN OF CARE
Problem: Fall Risk (Adult)  Goal: Identify Related Risk Factors and Signs and Symptoms  Outcome: Outcome(s) achieved Date Met:  04/20/17  Goal: Absence of Falls  Outcome: Ongoing (interventions implemented as appropriate)    Problem: Patient Care Overview (Adult)  Goal: Plan of Care Review  Outcome: Ongoing (interventions implemented as appropriate)    04/20/17 0352   Coping/Psychosocial Response Interventions   Plan Of Care Reviewed With patient   Outcome Evaluation   Outcome Summary/Follow up Plan Patient had MRI brain without contrast. Same negative for acute intracranial disease. No compliant of pain voiced. Assisted to bathroom couple of times. Urine collected and sent to lab. Patient started on Imdur on previous shift and has been bradycardic on monitor. So far no complaint of pain voiced. Nursing will continue to monitor..    Patient Care Overview   Progress no change       Goal: Adult Individualization and Mutuality  Outcome: Ongoing (interventions implemented as appropriate)  Goal: Discharge Needs Assessment  Outcome: Ongoing (interventions implemented as appropriate)    Problem: Mobility, Physical Impaired (Adult)  Goal: Identify Related Risk Factors and Signs and Symptoms  Outcome: Ongoing (interventions implemented as appropriate)  Goal: Enhanced Mobility Skills  Outcome: Ongoing (interventions implemented as appropriate)  Goal: Enhanced Functionality Ability  Outcome: Ongoing (interventions implemented as appropriate)

## 2017-04-20 NOTE — PROGRESS NOTES
Continued Stay Note  [facility]     Patient Name: Alexandro Hallman  MRN: [id number]  Today's Date: 4/20/2017    Admit Date: 4/18/2017          Discharge Plan       04/20/17 1258    Case Management/Social Work Plan    Plan Home with wife, no needs    Final Note    Final Note d/c'd home      04/20/17 1157    Case Management/Social Work Plan    Plan Home with wife, no needs    Final Note    Final Note orders to d/c home              Discharge Codes       04/20/17 1258    Discharge Codes    Discharge Codes 01  Discharge to home        Expected Discharge Date and Time     Expected Discharge Date Expected Discharge Time    Apr 20, 2017             Abimbola Quigley RN

## 2017-04-20 NOTE — PROGRESS NOTES
"   LOS: 1 day   Patient Care Team:  Buster Howe MD as PCP - General (Family Medicine)  Buster Howe MD as PCP - Family Medicine    Chief Complaint: none today    Subjective     HPI Comments: No complaints today. Back to baseline per wife at bedside. Eager to go home.    Fatigue   Associated symptoms include fatigue. Pertinent negatives include no anorexia, chest pain, coughing, fever, nausea, vomiting or weakness.   Shortness of Breath   Pertinent negatives include no chest pain, fever or vomiting.       Subjective:  Symptoms:  Resolved.  No shortness of breath, malaise, cough, chest pain, weakness, headache, chest pressure, anorexia, diarrhea or anxiety.    Diet:  Adequate intake.  No nausea or vomiting.    Activity level: Normal.    Pain:  He reports no pain.        History taken from: patient chart family    Objective     Vital Signs  Temp:  [98.1 °F (36.7 °C)-98.8 °F (37.1 °C)] 98.2 °F (36.8 °C)  Heart Rate:  [45-89] 83  Resp:  [16-20] 20  BP: ()/(51-72) 80/51    Objective:  General Appearance:  Comfortable and in no acute distress.    Vital signs: (most recent): Blood pressure (!) 80/51, pulse 83, temperature 98.2 °F (36.8 °C), temperature source Oral, resp. rate 20, height 75\" (190.5 cm), weight 165 lb 8 oz (75.1 kg), SpO2 91 %.  Vital signs are normal.  No fever.    Output: Producing urine and producing stool.    Lungs:  Normal respiratory rate and normal effort.  Breath sounds clear to auscultation.    Heart: Normal rate.  Regular rhythm.    Abdomen: Bowel sounds are normal.   There is no abdominal tenderness.     Extremities: There is no dependent edema.    Pulses: Distal pulses are intact.    Neurological: Patient is alert and oriented to person, place and time.    Skin:  Warm and dry.              Results Review:     I reviewed the patient's new clinical results.  I reviewed the patient's new imaging results and agree with the interpretation.  I reviewed the patient's other test results and agree " with the interpretation  Discussed with patient and wife    Medication Review: reviewed    Assessment/Plan     Principal Problem:    Weakness  Active Problems:    Essential hypertension    Hypothyroidism    Parkinson's disease    Gait instability    Elevated troponin      Assessment:  (1. Weakness  2. Abnl UA  3. Indeterminate Trop  4. Leukocytosis  5. Parkinson's (?Lewy Body)  6. Alzheimer's  7. HTN  8. COPD/Tobacco Abuse  9. HypoT4  10. DDD  11. Depression).     Plan:   (Pt back to baseline  Appreciate Neuro input  MRI showed nothing acute  No further w/u per Neuro  Namenda restarted  Card input appreciated, amlodipine dose decreased  Trop normalized and EKGs okay  Can probably go home with wife since back to baseline activity  Repeat UA looks fine  DC Dictated #[id number]).       Abhishek Cade MD  04/20/17  11:07 AM    Time: Discharge 40 min

## 2017-04-21 NOTE — DISCHARGE SUMMARY
DATE OF ADMISSION: 04/18/2017  DATE OF DISCHARGE: 04/20/2017    ADMITTING/DISCHARGE DIAGNOSIS: Generalized weakness.     SECONDARY DIAGNOSES:  1. Abnormal urinalysis.   2. Indeterminate troponin.   3. Leukocytosis.   4. Parkinson's.   5. Alzheimer’s.   6. Hypertension.   7. Chronic obstructive pulmonary disease and ongoing tobacco abuse.   8. Hypothyroidism.   9. Degenerative disk disease of the cervical and lumbar spine.   10. Depression.     CONSULTANTS:   1. Attila Oneil MD, Neurology  2. Spenser Borrero MD, Cardiology     HISTORY OF PRESENT ILLNESS: Please see admission History and Physical for details, but in brief the patient is a pleasant 77-year-old gentleman who presented to the emergency department with his wife who reported complaints of progressive weakness over the course of a few days. There were also some reports of decreased urinary frequency. He was noted to have abnormal urinalysis and indeterminate troponin, otherwise pretty unremarkable evaluation. He was quite weak and was unable to walk. he was admitted to our service for further evaluation and management. Cardiology and Neurology were consulted.     HOSPITAL COURSE: The patient’s strength/activity returned rapidly to baseline without any specific treatment. Neurology felt that the cause of his decline was unclear, but that it could just be due to day-to-day fluctuations in his dementia. Dr. Oneil also raised the possibility of Lewy body dementia. The patient does tend to see a lot of people that are not there. Dr. Oneil recommended MRI of the brain and that was unremarkable. Dr. Oneil felt that if MRI was okay the patient was fine to go home. He is doing even better today. His wife feels comfortable taking him home. Repeat urinalysis was normal. His troponin normalized. Cardiology did not suggest any further workup as he never had any cardiac symptoms. Blood pressure was a little low today, so they have cut his dose of  amlodipine in half (2.5 mg a day).    DISPOSITION: Home with wife.     DIET: Healthy heart.     ACTIVITY: As tolerated.     DISCHARGE MEDICATIONS:  1. Aspirin 81 mg p.o. daily.   2. Imdur 30 mg p.o. daily.   3. Asmanex.   4. Serevent.   5. Spiriva.   6. Anoro Ellipta.   7. Amlodipine 2.5 mg p.o. daily.   8. Triamcinolone ointment.   9. MiraLAX.   10. Flomax 0.4 mg p.o. q.p.m.   11. Aricept 23 mg p.o. q.p.m.   12. Namenda XR 28 mg p.o. daily.   13. Levothyroxine 50 mcg p.o. daily.   14. Vitamin D3, take 1000 units 2 tablets p.o. b.i.d.     FOLLOWUP:   1. The patient is instructed to follow up with his primary care physician sometime in the next week.   2. Follow up with his neurologist, Dr. Marielle Braxton at next regularly scheduled appointment.     Greater than 30 minutes was spent in the coordination of this discharge.       RADHA Valles:ab  D:   04/20/2017 11:21:41  T:   04/20/2017 20:12:34  Job ID:   [id number]  Document ID:   [id number]  cc:

## 2017-06-13 NOTE — TELEPHONE ENCOUNTER
----- Message from Halle Licea sent at 6/13/2017 12:19 PM EDT -----  Contact: [phone]  Pts wife called and wanted to know if  could put in another order for pt to have physical therapy. Pt was in the hospital when the 1st order was put in. Wife feels like pt is ready to do therapy now. [place]. Wife is concered about pts hallucinations.

## 2017-06-14 NOTE — TELEPHONE ENCOUNTER
Inform the patient that I ordered for physical therapy to help with the balance issues.  Ask the patient's wife if she can come and  the samples of Nuplazid to help with the hallucinations related to Parkinson's disease.

## 2017-06-27 NOTE — PROGRESS NOTES
Physical Therapy Initial Evaluation and Plan of Care    Patient: Alexandro Hallman   : 1940  Diagnosis/ICD-10 Code:  No primary diagnosis found.  Referring practitioner: Marielle Braxton MD  Past medical Hx reviewed: 2017     Subjective Evaluation    History of Present Illness  Onset date: over 5-6 months.  Mechanism of injury: This past few months my weakness has gotten worse and having more difficulty walking, balancing and getting up and down out of chairs.  I see a neurologist and was provided medication to treat for parkinson's.  No reported falls in the last few months but having trouble turning direction when walking.  Change in position can be challenging.  He does require hand held assistance for traversing uneven terrain, changes in elevation (doorway thresholds).  Not using an assistive devise because it's hard to use.  Still trying to walk out to my mailbox daily but its getting harder to walk outside.      Quality of life: good    Pain  Location: Legs feel heavy and fatigued from walking too long.   (No pain report)  Relieving factors: rest  Progression: worsening    Social Support  Lives in: multiple-level home (2-3 stairs to enter home.  )  Lives with: spouse    Patient Goals  Patient goals for therapy: increased strength, independence with ADLs/IADLs, improved balance and increased motion       Objective     Strength/Myotome Testing     Additional Strength Details  LE (B) strength gross: 4/5     Ambulation     Ambulation: Stairs   Ascend stairs: contact guard assist  Pattern: reciprocal  Railings: one rail  Descend stairs: minimum assist  Pattern: reciprocal  Railings: one rail    Additional Stairs Ambulation Details  He has difficulty with foot placement descending stairs and uses the back of the heel to feel for the edge of the steps.  He also turns trunk and feet to the side.  Ascending stairs he does not complete full knee extension while transferring weight to standing on each  step.  Requires SBA to Min. Assist for safety.      Observational Gait   Gait: crouched   Decreased walking speed and stride length.   Left foot contact pattern: foot flat  Right foot contact pattern: foot flat  Base of support: decreased    Additional Observational Gait Details  Endurance Test for ambulation: 4:00 walking, CGAx1 with belt.  SPO2: 87% after test, HR: 78 BPM.    5 + min of rest: SpO2: 96 %   Frequent cues (verbal and tactile) needed to keep patient on walking path.         Assessment & Plan     Assessment  Impairments: abnormal coordination, abnormal gait, activity intolerance, impaired balance, impaired physical strength, lacks appropriate home exercise program and safety issue  Assessment details: Mr. Hallman presents to PT with declining physical capacity for ambulation and balance but his mental capacity for following instruction and direction has also been progressively impaired.  Due to difficulty following enough instruction to keep meaningful HEP, we will focus on increased frequency of PT visits to attempt any gains in function.  Fortunately, the wife does not report any major falls recently but he is at heightened risk for falls.  Of concern is his significant decline in SpO2 with exersion/testing.  Close monitoring will be required for further treatment.  Pt maybenefit from skilled care for the listed deficits and will be re-assessed in 2-3 weeks to determine any improvements.    Barriers to therapy: Medical diagnoses, polypharmacy,    Prognosis: fair  Prognosis details:     SHORT TERM GOALS: 9 visits   1. Pt will improve standing balance with eyes closed to require only supervision > 15 sec on foam.     2. Pt. will improve (B) LE strength to >4/5 (in sitting) as needed to perform sit to stand txf x 5 without UE.   3. Pt. Will demonstrate the ability to ambulate with SBA x 1 with proper adjustments and forward gaze for 5 min. (SpO2>92%)   4. Pt. Will traverse 10 stairs (up/down) with 1  railing and supervision for entering/exiting home safely.      LONG TERM GOALS: 15 visits   1. Pt will improve standing balance with narrow SONIA, eyes closed to require only supervision > 20 sec.   2. Pt. will improve gluteal and gastrocs strength to 4+/5 (in sitting) as needed to improve 30 sec sit to stand to 10x with UE assist on thighs as needed.    3. Pt. Will demonstrate the ability to ambulate with SBA x 1, and change of direction with only verbal cues for >7 min.SpO2 > 92%    4. Pt. Will decrease TUG score to < 10  Sec, Lawrence Balance > 48/56 demonstrating improved mobility safety.        Plan  Therapy options: will be seen for skilled physical therapy services  Planned therapy interventions: neuromuscular re-education, postural training, gait training, functional ROM exercises, flexibility, balance/weight-bearing training, strengthening, stretching, therapeutic activities, transfer training and home exercise program  Frequency: 3x week  Duration in weeks: 6  Treatment plan discussed with: patient    Manual Therapy:    -     mins  91758;  Therapeutic Exercise:    -     mins  51762;     Neuromuscular Kiera:    4    mins  07417;    Therapeutic Activity:     -     mins  28150;     Gait Trainin     mins  91572;     Ultrasound:     -     mins  61655;    Electrical Stimulation:    -     mins  48595 ( );  Dry Needling     -     mins self-pay    Timed Treatment:   10   mins   Total Treatment:     50   mins    PT SIGNATURE: ALDAIR Nelson License #: [id number]    DATE TREATMENT INITIATED: 2017    Medicare Initial Certification  Certification Period: 2017  I certify that the therapy services are furnished while this patient is under my care.  The services outlined above are required by this patient, and will be reviewed every 90 days.     PHYSICIAN: Marielle Braxton MD      DATE:     Please sign and return via fax to [phone].. Thank you, [facility].

## 2017-07-10 NOTE — PROGRESS NOTES
Physical Therapy Daily Progress Note  Visits:2    Subjective : Alexandro Hallman reports: Pt wife reports that he was tired after last visit but seemed to have more energy after session      Objective   See Exercise, Manual, and Modality Logs for complete treatment.     Assessment/Plan:Pt tolerated TE initiation today but continues to have significant difficulty with sequencing for 4 square stepping and traversing stairs.  He does better with tactile cues vs. Verbal.    Progress per Plan of Care       Manual Therapy:    -     mins  38130;  Therapeutic Exercise:    25     mins  29726;     Neuromuscular Kiera:    -    mins  35376;    Therapeutic Activity:     -     mins  83407;     Gait Training:     10     mins  52861;     Ultrasound:     -     mins  11513;    Electrical Stimulation:    -     mins  72047 ( );  Dry Needling     -     mins self-pay    Timed Treatment:   35   mins   Total Treatment:     56   mins    ALDAIR Nelson License #[id number]    Physical Therapist

## 2017-07-12 PROBLEM — R44.3 HALLUCINATIONS, UNSPECIFIED: Status: ACTIVE | Noted: 2017-01-01

## 2017-07-12 NOTE — PROGRESS NOTES
"Chief Complaint   Patient presents with   • Hypothyroidism   • needs neuro referral       Subjective   This patient presents the office to refill medicines.  Labs are due.  Thyroid is stable pending labs.  His prostate symptoms are stable with tamsulosin.    His neurologist is leaving the practice and he needs a new referral.  His has a problem of hallucinations with his central nervous disease including Parkinson's and dementia. They are not dangerous but it does upset him.   I have reviewed and updated his medications, medical history and problem list during today's office visit.        Social History   Substance Use Topics   • Smoking status: Former Smoker     Types: Cigarettes     Quit date: 4/20/2017   • Smokeless tobacco: Never Used   • Alcohol use No       Review of Systems   Constitutional: Negative for fatigue.   Cardiovascular: Negative for chest pain.   Psychiatric/Behavioral: Negative for suicidal ideas (no homocidal ideas).        See hpi       Objective   /81  Pulse 59  Temp 97.4 °F (36.3 °C) (Oral)   Resp 16  Ht 75\" (190.5 cm)  Wt 160 lb (72.6 kg)  BMI 20 kg/m2  Body mass index is 20 kg/(m^2).  Physical Exam   Constitutional: He is cooperative. No distress.   Eyes: Conjunctivae and lids are normal.   Neck: Carotid bruit is not present. No tracheal deviation present.   Cardiovascular: Normal rate, regular rhythm and normal heart sounds.    No murmur heard.  Pulmonary/Chest: Effort normal and breath sounds normal.   Neurological: He is alert. He is not disoriented.   Skin: Skin is warm and dry.   Psychiatric: He has a normal mood and affect. His speech is normal and behavior is normal.   Vitals reviewed.        Assessment/Plan     Problem List Items Addressed This Visit        Endocrine    Hypothyroidism - Primary    Relevant Medications    levothyroxine (SYNTHROID, LEVOTHROID) 50 MCG tablet       Nervous and Auditory    Alzheimer's type dementia    Relevant Medications    mirtazapine " (REMERON) 30 MG tablet    Other Relevant Orders    Ambulatory Referral to Neurology    Parkinson's disease    Relevant Medications    pramipexole (MIRAPEX) 0.125 MG tablet    Other Relevant Orders    Ambulatory Referral to Neurology       Genitourinary    Benign prostatic hypertrophy (BPH) with incomplete bladder emptying    Relevant Medications    tamsulosin (FLOMAX) 0.4 MG capsule 24 hr capsule       Other    Hallucinations, unspecified          Outpatient Encounter Prescriptions as of 7/12/2017   Medication Sig Dispense Refill   • amLODIPine (NORVASC) 2.5 MG tablet Take 1 tablet by mouth Daily. 30 tablet 3   • aspirin 81 MG chewable tablet Chew 81 mg daily.     • cholecalciferol (VITAMIN D3) 1000 UNITS tablet Take 2,000 Units by mouth 2 (two) times a day.     • donepezil (ARICEPT) 23 MG tablet Take 23 mg by mouth Every Night.     • isosorbide mononitrate (IMDUR) 30 MG 24 hr tablet Take 1 tablet by mouth Daily. 30 tablet 3   • mirtazapine (REMERON) 30 MG tablet      • NAMENDA XR 28 MG capsule sustained-release 24 hr extended release capsule      • polyethylene glycol (GLYCOLAX) powder Take  by mouth daily.     • pramipexole (MIRAPEX) 0.125 MG tablet      • triamcinolone (KENALOG) 0.1 % ointment      • Umeclidinium-Vilanterol (ANORO ELLIPTA) 62.5-25 MCG/INH aerosol powder  Inhale.     • levothyroxine (SYNTHROID, LEVOTHROID) 50 MCG tablet Take 1 tablet by mouth Daily for 180 days. 90 tablet 1   • tamsulosin (FLOMAX) 0.4 MG capsule 24 hr capsule Take 1 capsule by mouth Every Night for 180 days. 90 capsule 1   • [DISCONTINUED] ASMANEX 60 METERED DOSES 220 MCG/INH inhaler      • [DISCONTINUED] levothyroxine (SYNTHROID, LEVOTHROID) 50 MCG tablet Take 1 tablet by mouth Daily for 180 days. 90 tablet 1   • [DISCONTINUED] salmeterol (SEREVENT DISKUS) 50 MCG/DOSE diskus inhaler Serevent Diskus 50 MCG/DOSE Inhalation Aerosol Powder Breath Activated; Patient Sig: Serevent Diskus 50 MCG/DOSE Inhalation Aerosol Powder Breath  Activated ; 0; 13-Mar-2015; Active     • [DISCONTINUED] tamsulosin (FLOMAX) 0.4 MG capsule 24 hr capsule Take 1 capsule by mouth Every Night for 180 days. 90 capsule 1   • [DISCONTINUED] tiotropium (SPIRIVA HANDIHALER) 18 MCG per inhalation capsule Spiriva HandiHaler 18 MCG Inhalation Capsule; Patient Sig: Spiriva HandiHaler 18 MCG Inhalation Capsule ; 0; 13-Mar-2015; Active       No facility-administered encounter medications on file as of 7/12/2017.        Orders Placed This Encounter   Procedures   • Ambulatory Referral to Neurology     Referral Priority:   Routine     Referral Type:   Consultation     Referral Reason:   Specialty Services Required     Requested Specialty:   Neurology     Number of Visits Requested:   1       Continue with current treatment plan.         F/U in 6 months

## 2017-07-12 NOTE — PROGRESS NOTES
Physical Therapy Daily Progress Note  Visits:3    Subjective : Alexandro Hallman reports: The wife explains that Alexandro seemed to be energized from previous PT visit.  He has done well with PT thus far.      Objective   See Exercise, Manual, and Modality Logs for complete treatment.     Assessment/Plan:Pt tolerated TE progression well and was able to follow instruction for ambulating stairs better by doing them early into his program today.      Progress per Plan of Care         Manual Therapy:         mins  38623;  Therapeutic Exercise:    35     mins  35882;     Neuromuscular Kiera:    6    mins  14061;    Therapeutic Activity:     -     mins  27885;     Gait Trainin     mins  55718;     Ultrasound:     -     mins  22528;    Electrical Stimulation:    -     mins  61477 ( );  Dry Needling     -     mins self-pay    Timed Treatment:   49   mins   Total Treatment:     63   mins    ALDAIR Nelson License #[id number]    Physical Therapist

## 2017-07-14 NOTE — PROGRESS NOTES
Physical Therapy Daily Progress Note  Visits:4    Subjective : Alexandro Hallman reports: no new complaints today.      Objective   See Exercise, Manual, and Modality Logs for complete treatment.     Assessment/Plan:Pt tolerated treatments well and we did discover that distraction was an excellent method for getting him to consistently keep his gaze forward with ambulation.  Pt wife was instructed to use distraction to help him perform initiation of simple tasks of transfers and changes in positions.      Progress per Plan of Care     Manual Therapy:    -     mins  42580;  Therapeutic Exercise:    35     mins  68392;     Neuromuscular Kiera:    8    mins  42877;    Therapeutic Activity:     -     mins  01632;     Gait Training:      10     mins  18849;     Ultrasound:     -     mins  54099;    Electrical Stimulation:    -     mins  28780 ( );  Dry Needling     -     mins self-pay    Timed Treatment:   53   mins   Total Treatment:     65   mins    ALDAIR Nelson License #[id number]    Physical Therapist

## 2017-07-17 NOTE — PROGRESS NOTES
Physical Therapy Daily Progress Note  Visits:5    Subjective : Alexandro Hallman reports: I had a doctors appointment this morning and had to fast this morning for blood test.  I did eat lunch before coming but a little tired.      Objective   See Exercise, Manual, and Modality Logs for complete treatment.     Assessment/Plan:Pt tolerated TE fair today.  Session was discontinued early due to significant fatigue noted.  At conclusion of balance training SpO2 was measured at 90%.  Upon resting, recovered to 98%  Progress per Plan of Care     Manual Therapy:    -     mins  13733;  Therapeutic Exercise:    20     mins  34616;     Neuromuscular Kiera:    10    mins  93113;    Therapeutic Activity:     -     mins  68917;     Gait Trainin     mins  20769;     Ultrasound:     -     mins  50538;    Electrical Stimulation:    -     mins  88078 ( );  Dry Needling     -     mins self-pay    Timed Treatment:   38   mins   Total Treatment:     45   mins    ALDAIR Nelson License #[id number]    Physical Therapist

## 2017-07-20 NOTE — PROGRESS NOTES
Physical Therapy Daily Progress Note  Visits:6    Subjective : Alexandro Hallman reports: No new complaints.  Wife explains that he reports a little fatigue today.      Objective: Ambulation: with prompts for speed, he is able to keep gaze higher and has less deviation from midline.    See Exercise, Manual, and Modality Logs for complete treatment.     Assessment/Plan:Pt tolerated treatment well.  With increased gait speed he was able to demonstrate better postures and stability.  He continues to have difficulty with changes in direction and requires considerable tactile cues for direction.      Progress per Plan of Care, re-assess next visit         Manual Therapy:    -     mins  43124;  Therapeutic Exercise:    35     mins  47238;     Neuromuscular Kiera:    10    mins  71478;    Therapeutic Activity:     -     mins  68146;     Gait Training:      10     mins  79017;     Ultrasound:     -     mins  10998;    Electrical Stimulation:    -     mins  75781 ( );  Dry Needling     -     mins self-pay    Timed Treatment:   55   mins   Total Treatment:     60   mins    ALDAIR Nelson License #[id number]    Physical Therapist

## 2017-07-24 NOTE — PROGRESS NOTES
Physical Therapy Daily Progress Note  Visits:7    Subjective : Alexandro Hallman reports: Not as tired today but I have be up and around some.     Objective   See Exercise, Manual, and Modality Logs for complete treatment.     Assessment/Plan:Pt tolerating treatment well overall and will be re-assessed next visit.  Carryover for home tasks is questionable at this point but he does very well under the direction of PT with additional cues for sequencing gait events.      Other         Manual Therapy:    -     mins  58295;  Therapeutic Exercise:    30     mins  41593;     Neuromuscular Kiera:    15    mins  51734;    Therapeutic Activity:     -     mins  13546;     Gait Training:      10     mins  92797;     Ultrasound:     -     mins  76504;    Electrical Stimulation:    -     mins  35486 ( );  Dry Needling     -     mins self-pay    Timed Treatment:   55   mins   Total Treatment:     63   mins    ALDAIR Nelson License #[id number]    Physical Therapist

## 2017-11-07 NOTE — ED PROVIDER NOTES
" EMERGENCY DEPARTMENT ENCOUNTER    CHIEF COMPLAINT  Chief Complaint: generalized weakness  History given by: patient, spouse  History limited by: dementia  Room Number: 14/14  PMD: Buster Howe MD  Cardiologist- Dr. Borrero    HPI:  Pt is a 77 y.o. male who presents to the ED with reported worsening generalized weakness over the last week. Pt's spouse states that the pt has been complaining of BLE \"soreness\". Pt's spouse states that the pt has a walker at home but does not use it to ambulate. Pt denies fever, N/V/D, cough, CP, SOB, abd pain, congestion, visual changes or additional symptoms.     Duration:  One week  Onset: gradual  Timing: constant  Location: generalized  Radiation: N/A  Quality: weakness  Intensity/Severity: moderate  Progression: worsening  Associated Symptoms: BLE \"soreness\"  Aggravating Factors: none  Alleviating Factors: none  Previous Episodes: Pt's spouse states that the pt has had generalized weakness previously but that it has progressively become worse over the last week.  Treatment before arrival: none    PAST MEDICAL HISTORY  Active Ambulatory Problems     Diagnosis Date Noted   • Alzheimer's type dementia 11/18/2015   • Central spinal stenosis 12/10/2015   • Essential hypertension 01/11/2016   • Hypothyroidism 01/11/2016   • Benign prostatic hypertrophy (BPH) with incomplete bladder emptying 01/11/2016   • Communicating hydrocephalus 07/12/2016   • Parkinson's disease 09/13/2016   • Gait instability 09/13/2016   • Weakness 04/18/2017   • Hallucinations, unspecified 07/12/2017     Resolved Ambulatory Problems     Diagnosis Date Noted   • Viral syndrome 11/18/2015   • Abnormal thyroid blood test 12/10/2015   • Elevated brain natriuretic peptide (BNP) level 12/10/2015   • Leg weakness, bilateral 12/10/2015   • Localized edema 12/10/2015   • Hospital discharge follow-up 12/10/2015   • Elevated troponin 04/18/2017     Past Medical History:   Diagnosis Date   • Abnormal TSH 12/2008   • " Alzheimer's dementia    • Anorexia 02/28/2008   • Arthritis    • Arthritis of shoulder 04/01/2014   • Arthropathy of pelvic region and thigh    • BPH (benign prostatic hyperplasia)    • Bursitis    • Cholelithiasis 03/27/2008   • Chronic constipation    • Colonic polyp    • Communicating hydrocephalus    • Constipation    • Contusion of thigh, left 02/19/2014   • COPD (chronic obstructive pulmonary disease)    • DDD (degenerative disc disease), cervical    • Dementia    • Depression 05/02/2012   • Diverticulosis    • Eczema    • Glossitis 09/17/2009   • Hypertension 12/03/2007   • Hypothyroidism 03/14/2011   • Ingrown nail 12/2007   • Lumbar stenosis    • Lumbar stenosis    • Pneumonia    • Right shoulder tendonitis 02/29/2014   • Shortness of breath        PAST SURGICAL HISTORY  Past Surgical History:   Procedure Laterality Date   • BACK SURGERY     • CATARACT EXTRACTION, BILATERAL     • COLONOSCOPY  11/04/2011   • FOOT SURGERY Left 1970    spur repair   • FOOT SURGERY Right 1993    spur   • HERNIA REPAIR  1969    double hernia repair   • KNEE SURGERY  1967    Left Knee   • SPINE SURGERY  1997    disc repair       FAMILY HISTORY  Family History   Problem Relation Age of Onset   • Heart disease Mother    • Liver cancer Mother    • Hypertension Mother    • Cancer Mother    • Coronary artery disease Father    • Dementia Father    • Heart disease Father    • Stroke Maternal Grandfather    • Alzheimer's disease Other        SOCIAL HISTORY  Social History     Social History   • Marital status:      Spouse name: N/A   • Number of children: N/A   • Years of education: N/A     Occupational History   • Not on file.     Social History Main Topics   • Smoking status: Former Smoker     Types: Cigarettes     Quit date: 4/20/2017   • Smokeless tobacco: Never Used   • Alcohol use No   • Drug use: No   • Sexual activity: No     Other Topics Concern   • Not on file     Social History Narrative       ALLERGIES  Factive  "[gemifloxacin]; Fluticasone-salmeterol; and Cefdinir    REVIEW OF SYSTEMS  Review of Systems   Unable to perform ROS: Dementia   Musculoskeletal: Positive for myalgias (BLE \"soreness\").   Neurological: Positive for weakness (generalized).       PHYSICAL EXAM  ED Triage Vitals   Temp Heart Rate Resp BP SpO2   11/07/17 1343 11/07/17 1350 11/07/17 1350 11/07/17 1350 11/07/17 1350   96.9 °F (36.1 °C) 68 16 117/80 95 %      Temp src Heart Rate Source Patient Position BP Location FiO2 (%)   -- -- -- -- --              Physical Exam   Constitutional: No distress.   HENT:   Head: Normocephalic and atraumatic.   Eyes: EOM are normal. Pupils are equal, round, and reactive to light.   Neck: Normal range of motion. Neck supple.   Cardiovascular: Normal rate, regular rhythm, normal heart sounds and intact distal pulses.  Frequent extrasystoles are present.   Pulmonary/Chest: Effort normal and breath sounds normal. No respiratory distress.   Abdominal: Soft. Bowel sounds are normal. There is no tenderness. There is no rebound, no guarding and no CVA tenderness.   Musculoskeletal: Normal range of motion. He exhibits edema (1+ pitting edema to bilateral lower legs, feet and ankles).   Neurological: He is alert. He has normal sensation and normal strength.   Skin: Skin is warm and dry.   Nursing note and vitals reviewed.      LAB RESULTS  Lab Results (last 24 hours)     Procedure Component Value Units Date/Time    CBC & Differential [[id number]] Collected:  11/07/17 1404    Specimen:  Blood Updated:  11/07/17 1440    Narrative:       The following orders were created for panel order CBC & Differential.  Procedure                               Abnormality         Status                     ---------                               -----------         ------                     CBC Auto Differential[[id number]]        Abnormal            Final result                 Please view results for these tests on the individual orders.    " Comprehensive Metabolic Panel [[id number]] Collected:  11/07/17 1404    Specimen:  Blood Updated:  11/07/17 1501     Glucose 76 mg/dL      BUN 22 mg/dL      Creatinine 1.11 mg/dL      Sodium 139 mmol/L      Potassium 4.5 mmol/L      Chloride 101 mmol/L      CO2 26.8 mmol/L      Calcium 8.8 mg/dL      Total Protein 6.3 g/dL      Albumin 3.60 g/dL      ALT (SGPT) 13 U/L      AST (SGOT) 17 U/L      Alkaline Phosphatase 116 U/L      Total Bilirubin 0.3 mg/dL      eGFR Non African Amer 64 mL/min/1.73      Globulin 2.7 gm/dL      A/G Ratio 1.3 g/dL      BUN/Creatinine Ratio 19.8     Anion Gap 11.2 mmol/L     Narrative:       The MDRD GFR formula is only valid for adults with stable renal function between ages 18 and 70.    Troponin [[id number]]  (Normal) Collected:  11/07/17 1404    Specimen:  Blood Updated:  11/07/17 1501     Troponin T 0.018 ng/mL     Narrative:       Troponin T Reference Ranges:  Less than 0.03 ng/mL:    Negative for AMI  0.03 to 0.09 ng/mL:      Indeterminant for AMI  Greater than 0.09 ng/mL: Positive for AMI    Magnesium [[id number]]  (Normal) Collected:  11/07/17 1404    Specimen:  Blood Updated:  11/07/17 1501     Magnesium 2.1 mg/dL     CBC Auto Differential [[id number]]  (Abnormal) Collected:  11/07/17 1404    Specimen:  Blood Updated:  11/07/17 1440     WBC 12.89 (H) 10*3/mm3      RBC 4.08 (L) 10*6/mm3      Hemoglobin 11.7 (L) g/dL      Hematocrit 36.6 (L) %      MCV 89.7 fL      MCH 28.7 pg      MCHC 32.0 (L) g/dL      RDW 13.4 %      RDW-SD 43.9 fl      MPV 9.6 fL      Platelets 245 10*3/mm3      Neutrophil % 74.7 %      Lymphocyte % 14.8 (L) %      Monocyte % 8.8 %      Eosinophil % 1.2 %      Basophil % 0.3 %      Immature Grans % 0.2 %      Neutrophils, Absolute 9.63 (H) 10*3/mm3      Lymphocytes, Absolute 1.91 10*3/mm3      Monocytes, Absolute 1.14 10*3/mm3      Eosinophils, Absolute 0.15 10*3/mm3      Basophils, Absolute 0.04 10*3/mm3      Immature Grans, Absolute 0.02 10*3/mm3     Urinalysis  With / Culture If Indicated - Urine, Clean Catch [[id number]]  (Normal) Collected:  11/07/17 1627    Specimen:  Urine from Urine, Catheter Updated:  11/07/17 1644     Color, UA Yellow     Appearance, UA Clear     pH, UA 6.0     Specific Gravity, UA 1.011     Glucose, UA Negative     Ketones, UA Negative     Bilirubin, UA Negative     Blood, UA Negative     Protein, UA Negative     Leuk Esterase, UA Negative     Nitrite, UA Negative     Urobilinogen, UA 1.0 E.U./dL    Narrative:       Urine microscopic not indicated.          I ordered the above labs and reviewed the results    RADIOLOGY  XR Chest 1 View   Final Result   No focal pulmonary consolidation. COPD change. Follow-up as   clinical indications persist.       This report was finalized on 11/7/2017 3:31 PM by Dr. Vicente Salcido MD.               I ordered the above noted radiological studies. Interpreted by radiologist. Reviewed by me in PACS.       PROCEDURES  Procedures  EKG           EKG time: 1601  Rhythm/Rate: NSR, 57  P waves and MO: normal  QRS, axis: Q waves in V1 and V2  ST and T waves: unremarkable    Interpreted Contemporaneously by me, independently viewed  unchanged compared to prior on 4-18-17    PROGRESS AND CONSULTS  ED Course     1710- Ordered that the pt be ambulated for further evaluation.    1710- Discussed the pt's case with Anaya (STUART RN), who agrees to discuss home care options with the pt and family.    1712- Rechecked pt. Pt is resting comfortalby. Notified pt of his unremarkable lab and imaging results. Discssed the plan to ambulate the pt prior to having Anaya (STUART RN) discuss home care options. pt agrees     1729- Per RN, the pt was able to ambulate in the ED without assistance.    1747- Discussed the pt's case with Anaya (STUART RN), who states that she discussed home care options for the pt and the pt is ready to be discharged home. I will discharge the pt home.    5:57 PM  Latest vital signs   BP- 153/91 HR- 53 Temp- 96.9 °F  (36.1 °C) O2 sat- 93%    DECISION MAKING  Results were reviewed/discussed with the patient and they were also made aware of online access. Pt also made aware that some labs, such as cultures, will not be resulted during ER visit and follow up with PMD is necessary.     MEDICAL   MDM  Number of Diagnoses or Management Options     Amount and/or Complexity of Data Reviewed  Clinical lab tests: ordered and reviewed (Troponin=0.018)  Tests in the radiology section of CPT®: ordered and reviewed (CXR shows chronic changes but nothing acute)  Tests in the medicine section of CPT®: ordered and reviewed (See EKG procedure note)  Decide to obtain previous medical records or to obtain history from someone other than the patient: yes  Obtain history from someone other than the patient: yes (spouse)  Review and summarize past medical records: yes (Pt was admitted from 4-18 until 4-20-17 for progressive weakness. Pt was found to have an indeterminate Troponin and abnormal UA. Pt had a normal stress test in February 2016.)  Discuss the patient with other providers: yes (CECELIA/tianna Julien ([facility] RN))  Independent visualization of images, tracings, or specimens: yes    Patient Progress  Patient progress: stable         DIAGNOSIS  Final diagnoses:   Generalized weakness       DISPOSITION  DISCHARGE    Patient discharged in stable condition.    Reviewed implications of results, diagnosis, meds, responsibility to follow up, warning signs and symptoms of possible worsening, potential complications and reasons to return to ER, including fever, worsening pain or any concerns.    Patient/Family voiced understanding of above instructions.    Discussed plan for discharge, as there is no emergent indication for admission.  Pt/family is agreeable and understands need for follow up and repeat testing.  Pt is aware that discharge does not mean that nothing is wrong but it indicates no emergency is present that requires admission and they must continue care  with follow-up as given below or physician of their choice.     FOLLOW-UP  Buster Howe MD  [id number] [facility] [address]  [place] [address]  [phone]    Call  As needed, If symptoms worsen         Medication List      Notice     No changes were made to your prescriptions during this visit.            Latest Documented Vital Signs:  As of 5:48 PM  BP- 153/91 HR- 53 Temp- 96.9 °F (36.1 °C) O2 sat- 93%    --  Documentation assistance provided by minda Ramirez for Dr. Napoles.  Information recorded by the scribe was done at my direction and has been verified and validated by me.     Katerina Ramirez  11/07/17 6547       Feliciano Napoles MD  11/07/17 0576

## 2017-11-07 NOTE — PROGRESS NOTES
Discharge Planning Assessment  [facility]     Patient Name: Alexandro Hallman  MRN: [id number]  Today's Date: 11/7/2017    Admit Date: 11/7/2017          Discharge Needs Assessment     None            Discharge Plan       11/07/17 1754    Case Management/Social Work Plan    Plan Spoke at length with pt and spouse re: home needs. Several community resources given to pt and wife for adult day care and RTR book        Discharge Placement     No information found                Demographic Summary     None            Functional Status     None            Psychosocial     None            Abuse/Neglect     None            Legal     None            Substance Abuse     None            Patient Forms       11/07/17 1755    Patient Forms    Provider Choice List Delivered   RTR    Delivered to Patient    Method of delivery In person          Anaya Stevenson RN

## 2017-11-07 NOTE — ED TRIAGE NOTES
Patient and family reports generalized weakness for one week and bilateral lower extremity tenderness for three weeks.  Patient has slightly slurred speech but family unsure of onset.  History of dementia.

## 2017-11-15 NOTE — PROGRESS NOTES
Subjective:     Encounter Date:11/09/2017      Patient ID: Alexandro Hallman is a 77 y.o. male.    Chief Complaint:  Hypertension   This is a chronic problem. The current episode started more than 1 year ago. The problem is controlled. Associated symptoms include malaise/fatigue and peripheral edema. Pertinent negatives include no orthopnea, palpitations, PND or shortness of breath.       69-year-old gentleman with a history of hypertension who presents today for reevaluation.  Clinically he is doing well.  Occasional some swelling of his ankles occasional lightheaded.    Review of Systems   Constitution: Positive for malaise/fatigue.   Cardiovascular: Negative for orthopnea, palpitations and paroxysmal nocturnal dyspnea.   Respiratory: Negative for shortness of breath.    Psychiatric/Behavioral: Positive for depression.   All other systems reviewed and are negative.        ECG 12 Lead  Date/Time: 11/9/2017 3:50 PM  Performed by: TAMIR DURANT  Authorized by: TAMIR DURANT   Rhythm: sinus rhythm  Other findings: LVH  Clinical impression: non-specific ECG               Objective:     Physical Exam   Constitutional: He is oriented to person, place, and time. He appears well-developed.   HENT:   Head: Normocephalic.   Eyes: Conjunctivae are normal.   Neck: Normal range of motion.   Cardiovascular: Normal rate, regular rhythm and normal heart sounds.    Pulmonary/Chest: Breath sounds normal.   Abdominal: Soft. Bowel sounds are normal.   Musculoskeletal: Normal range of motion. He exhibits no edema.   Neurological: He is alert and oriented to person, place, and time.   Skin: Skin is warm and dry.   Psychiatric: He has a normal mood and affect. His behavior is normal.   Vitals reviewed.      Lab Review:       Assessment:         No diagnosis found.       Plan:       1.  Hypertension.  Blood pressures good continue the same.  2.  At this point nothing really to add follow-up in one to 2 years sooner course if  he develops cardiac symptoms.

## 2017-12-30 PROBLEM — R53.1 GENERALIZED WEAKNESS: Status: ACTIVE | Noted: 2017-01-01

## 2017-12-31 PROBLEM — J18.9 PNEUMONIA: Status: ACTIVE | Noted: 2017-01-01

## 2018-01-01 ENCOUNTER — APPOINTMENT (OUTPATIENT)
Dept: CT IMAGING | Facility: HOSPITAL | Age: 78
End: 2018-01-01

## 2018-01-01 ENCOUNTER — APPOINTMENT (OUTPATIENT)
Dept: PULMONOLOGY | Facility: HOSPITAL | Age: 78
End: 2018-01-01
Attending: PSYCHIATRY & NEUROLOGY

## 2018-01-01 ENCOUNTER — HOSPITAL ENCOUNTER (INPATIENT)
Facility: HOSPITAL | Age: 78
LOS: 8 days | Discharge: NURSING, REHAB OR LONG-TERM CARE | End: 2018-01-11
Attending: EMERGENCY MEDICINE | Admitting: INTERNAL MEDICINE

## 2018-01-01 ENCOUNTER — APPOINTMENT (OUTPATIENT)
Dept: MRI IMAGING | Facility: HOSPITAL | Age: 78
End: 2018-01-01

## 2018-01-01 ENCOUNTER — APPOINTMENT (OUTPATIENT)
Dept: CARDIOLOGY | Facility: HOSPITAL | Age: 78
End: 2018-01-01
Attending: NURSE PRACTITIONER

## 2018-01-01 ENCOUNTER — APPOINTMENT (OUTPATIENT)
Dept: GENERAL RADIOLOGY | Facility: HOSPITAL | Age: 78
End: 2018-01-01

## 2018-01-01 ENCOUNTER — HOSPITAL ENCOUNTER (INPATIENT)
Facility: HOSPITAL | Age: 78
LOS: 88 days | End: 2018-04-09
Attending: HOSPITALIST | Admitting: HOSPITALIST

## 2018-01-01 VITALS
SYSTOLIC BLOOD PRESSURE: 82 MMHG | RESPIRATION RATE: 56 BRPM | HEIGHT: 75 IN | WEIGHT: 146.81 LBS | BODY MASS INDEX: 18.25 KG/M2 | TEMPERATURE: 102.5 F | OXYGEN SATURATION: 86 % | DIASTOLIC BLOOD PRESSURE: 56 MMHG | HEART RATE: 121 BPM

## 2018-01-01 VITALS
WEIGHT: 160.4 LBS | SYSTOLIC BLOOD PRESSURE: 142 MMHG | RESPIRATION RATE: 18 BRPM | DIASTOLIC BLOOD PRESSURE: 89 MMHG | TEMPERATURE: 97.9 F | HEART RATE: 52 BPM | HEIGHT: 75 IN | OXYGEN SATURATION: 95 % | BODY MASS INDEX: 19.94 KG/M2

## 2018-01-01 VITALS
DIASTOLIC BLOOD PRESSURE: 72 MMHG | HEART RATE: 65 BPM | OXYGEN SATURATION: 93 % | WEIGHT: 165 LBS | TEMPERATURE: 96.8 F | RESPIRATION RATE: 16 BRPM | SYSTOLIC BLOOD PRESSURE: 162 MMHG | HEIGHT: 75 IN | BODY MASS INDEX: 20.51 KG/M2

## 2018-01-01 DIAGNOSIS — R13.12 OROPHARYNGEAL DYSPHAGIA: ICD-10-CM

## 2018-01-01 DIAGNOSIS — G93.41 ACUTE METABOLIC ENCEPHALOPATHY: ICD-10-CM

## 2018-01-01 DIAGNOSIS — R41.82 ALTERED MENTAL STATUS, UNSPECIFIED ALTERED MENTAL STATUS TYPE: Primary | ICD-10-CM

## 2018-01-01 DIAGNOSIS — E03.9 ACQUIRED HYPOTHYROIDISM: ICD-10-CM

## 2018-01-01 DIAGNOSIS — R91.8 LEFT PULMONARY INFILTRATE ON CXR: ICD-10-CM

## 2018-01-01 LAB
25(OH)D3 SERPL-MCNC: >60 NG/ML
ALBUMIN SERPL-MCNC: 3 G/DL (ref 3.5–5.2)
ALBUMIN SERPL-MCNC: 3 G/DL (ref 3.5–5.2)
ALBUMIN SERPL-MCNC: 3.1 G/DL (ref 3.5–5.2)
ALBUMIN SERPL-MCNC: 3.8 G/DL (ref 3.5–5.2)
ALBUMIN/GLOB SERPL: 1 G/DL
ALBUMIN/GLOB SERPL: 1.2 G/DL
ALP SERPL-CCNC: 109 U/L (ref 39–117)
ALP SERPL-CCNC: 112 U/L (ref 39–117)
ALP SERPL-CCNC: 116 U/L (ref 39–117)
ALP SERPL-CCNC: 142 U/L (ref 40–129)
ALT SERPL W P-5'-P-CCNC: 10 U/L (ref 1–41)
ALT SERPL W P-5'-P-CCNC: 12 U/L (ref 1–41)
ALT SERPL W P-5'-P-CCNC: 56 U/L (ref 5–41)
ALT SERPL W P-5'-P-CCNC: 9 U/L (ref 1–41)
ANION GAP SERPL CALCULATED.3IONS-SCNC: 10 MMOL/L
ANION GAP SERPL CALCULATED.3IONS-SCNC: 10 MMOL/L
ANION GAP SERPL CALCULATED.3IONS-SCNC: 10.2 MMOL/L
ANION GAP SERPL CALCULATED.3IONS-SCNC: 10.5 MMOL/L
ANION GAP SERPL CALCULATED.3IONS-SCNC: 10.8 MMOL/L
ANION GAP SERPL CALCULATED.3IONS-SCNC: 10.9 MMOL/L
ANION GAP SERPL CALCULATED.3IONS-SCNC: 11.4 MMOL/L
ANION GAP SERPL CALCULATED.3IONS-SCNC: 11.4 MMOL/L
ANION GAP SERPL CALCULATED.3IONS-SCNC: 11.8 MMOL/L
ANION GAP SERPL CALCULATED.3IONS-SCNC: 11.8 MMOL/L
ANION GAP SERPL CALCULATED.3IONS-SCNC: 12.2 MMOL/L
ANION GAP SERPL CALCULATED.3IONS-SCNC: 12.3 MMOL/L
ANION GAP SERPL CALCULATED.3IONS-SCNC: 13.1 MMOL/L
ANION GAP SERPL CALCULATED.3IONS-SCNC: 13.2 MMOL/L
ANION GAP SERPL CALCULATED.3IONS-SCNC: 13.5 MMOL/L
ANION GAP SERPL CALCULATED.3IONS-SCNC: 14.4 MMOL/L
ANION GAP SERPL CALCULATED.3IONS-SCNC: 14.7 MMOL/L
ANION GAP SERPL CALCULATED.3IONS-SCNC: 15.4 MMOL/L
ANION GAP SERPL CALCULATED.3IONS-SCNC: 18.8 MMOL/L
ANION GAP SERPL CALCULATED.3IONS-SCNC: 19.1 MMOL/L
ANION GAP SERPL CALCULATED.3IONS-SCNC: 7.4 MMOL/L
ANION GAP SERPL CALCULATED.3IONS-SCNC: 9.4 MMOL/L
ANION GAP SERPL CALCULATED.3IONS-SCNC: 9.7 MMOL/L
ARTERIAL PATENCY WRIST A: POSITIVE
AST SERPL-CCNC: 15 U/L (ref 1–40)
AST SERPL-CCNC: 15 U/L (ref 1–40)
AST SERPL-CCNC: 16 U/L (ref 1–40)
AST SERPL-CCNC: 36 U/L (ref 5–40)
ATMOSPHERIC PRESS: 765.3 MMHG
BACTERIA SPEC AEROBE CULT: ABNORMAL
BACTERIA SPEC AEROBE CULT: ABNORMAL
BACTERIA SPEC AEROBE CULT: NO GROWTH
BACTERIA UR QL AUTO: ABNORMAL /HPF
BASE EXCESS BLDA CALC-SCNC: -0.5 MMOL/L (ref 0–2)
BASOPHILS # BLD AUTO: 0.03 10*3/MM3 (ref 0–0.2)
BASOPHILS # BLD AUTO: 0.03 10*3/MM3 (ref 0–0.2)
BASOPHILS # BLD AUTO: 0.04 10*3/MM3 (ref 0–0.2)
BASOPHILS # BLD AUTO: 0.05 10*3/MM3 (ref 0–0.2)
BASOPHILS # BLD AUTO: 0.06 10*3/MM3 (ref 0–0.2)
BASOPHILS # BLD AUTO: 0.06 10*3/MM3 (ref 0–0.2)
BASOPHILS # BLD AUTO: 0.07 10*3/MM3 (ref 0–0.2)
BASOPHILS # BLD AUTO: 0.07 10*3/MM3 (ref 0–0.2)
BASOPHILS # BLD AUTO: 0.08 10*3/MM3 (ref 0–0.2)
BASOPHILS # BLD AUTO: 0.09 10*3/MM3 (ref 0–0.2)
BASOPHILS # BLD AUTO: 0.09 10*3/MM3 (ref 0–0.2)
BASOPHILS NFR BLD AUTO: 0.2 % (ref 0–1.5)
BASOPHILS NFR BLD AUTO: 0.2 % (ref 0–1.5)
BASOPHILS NFR BLD AUTO: 0.3 % (ref 0–1.5)
BASOPHILS NFR BLD AUTO: 0.4 % (ref 0–2)
BASOPHILS NFR BLD AUTO: 0.5 % (ref 0–1.5)
BASOPHILS NFR BLD AUTO: 0.5 % (ref 0–2)
BASOPHILS NFR BLD AUTO: 0.6 % (ref 0–2)
BASOPHILS NFR BLD AUTO: 0.7 % (ref 0–2)
BASOPHILS NFR BLD AUTO: 0.7 % (ref 0–2)
BASOPHILS NFR BLD AUTO: 0.8 % (ref 0–2)
BDY SITE: ABNORMAL
BILIRUB SERPL-MCNC: 0.4 MG/DL (ref 0.1–1.2)
BILIRUB SERPL-MCNC: 0.6 MG/DL (ref 0.1–1.2)
BILIRUB SERPL-MCNC: 0.6 MG/DL (ref 0.2–1.2)
BILIRUB SERPL-MCNC: 0.7 MG/DL (ref 0.1–1.2)
BILIRUB UR QL STRIP: ABNORMAL
BILIRUB UR QL STRIP: NEGATIVE
BUN BLD-MCNC: 14 MG/DL (ref 8–23)
BUN BLD-MCNC: 15 MG/DL (ref 8–23)
BUN BLD-MCNC: 15 MG/DL (ref 8–23)
BUN BLD-MCNC: 16 MG/DL (ref 8–23)
BUN BLD-MCNC: 17 MG/DL (ref 8–23)
BUN BLD-MCNC: 19 MG/DL (ref 8–23)
BUN BLD-MCNC: 19 MG/DL (ref 8–23)
BUN BLD-MCNC: 20 MG/DL (ref 8–23)
BUN BLD-MCNC: 22 MG/DL (ref 8–23)
BUN BLD-MCNC: 22 MG/DL (ref 8–23)
BUN BLD-MCNC: 24 MG/DL (ref 8–23)
BUN BLD-MCNC: 26 MG/DL (ref 8–23)
BUN BLD-MCNC: 28 MG/DL (ref 8–23)
BUN BLD-MCNC: 29 MG/DL (ref 8–23)
BUN BLD-MCNC: 31 MG/DL (ref 8–23)
BUN BLD-MCNC: 35 MG/DL (ref 8–23)
BUN BLD-MCNC: 35 MG/DL (ref 8–23)
BUN BLD-MCNC: 36 MG/DL (ref 8–23)
BUN BLD-MCNC: 36 MG/DL (ref 8–23)
BUN BLD-MCNC: 39 MG/DL (ref 8–23)
BUN BLD-MCNC: 45 MG/DL (ref 8–23)
BUN BLD-MCNC: 69 MG/DL (ref 8–23)
BUN BLD-MCNC: 71 MG/DL (ref 8–23)
BUN/CREAT SERPL: 14.3 (ref 7–25)
BUN/CREAT SERPL: 16.1 (ref 7–25)
BUN/CREAT SERPL: 17 (ref 7–25)
BUN/CREAT SERPL: 17.4 (ref 7–25)
BUN/CREAT SERPL: 18.2 (ref 7–25)
BUN/CREAT SERPL: 19 (ref 7–25)
BUN/CREAT SERPL: 19.1 (ref 7–25)
BUN/CREAT SERPL: 21.1 (ref 7–25)
BUN/CREAT SERPL: 21.4 (ref 7–25)
BUN/CREAT SERPL: 22.2 (ref 7–25)
BUN/CREAT SERPL: 22.9 (ref 7–25)
BUN/CREAT SERPL: 23.2 (ref 7–25)
BUN/CREAT SERPL: 24.2 (ref 7–25)
BUN/CREAT SERPL: 25.5 (ref 7–25)
BUN/CREAT SERPL: 26.7 (ref 7–25)
BUN/CREAT SERPL: 31.4 (ref 7–25)
BUN/CREAT SERPL: 31.7 (ref 7–25)
BUN/CREAT SERPL: 31.8 (ref 7–25)
BUN/CREAT SERPL: 35.2 (ref 7–25)
BUN/CREAT SERPL: 36.6 (ref 7–25)
BUN/CREAT SERPL: 36.9 (ref 7–25)
BUN/CREAT SERPL: 37.1 (ref 7–25)
BUN/CREAT SERPL: 40.2 (ref 7–25)
CALCIUM SPEC-SCNC: 8.1 MG/DL (ref 8.6–10.5)
CALCIUM SPEC-SCNC: 8.1 MG/DL (ref 8.6–10.5)
CALCIUM SPEC-SCNC: 8.3 MG/DL (ref 8.6–10.5)
CALCIUM SPEC-SCNC: 8.3 MG/DL (ref 8.8–10.5)
CALCIUM SPEC-SCNC: 8.4 MG/DL (ref 8.6–10.5)
CALCIUM SPEC-SCNC: 8.4 MG/DL (ref 8.8–10.5)
CALCIUM SPEC-SCNC: 8.5 MG/DL (ref 8.6–10.5)
CALCIUM SPEC-SCNC: 8.5 MG/DL (ref 8.8–10.5)
CALCIUM SPEC-SCNC: 8.5 MG/DL (ref 8.8–10.5)
CALCIUM SPEC-SCNC: 8.6 MG/DL (ref 8.8–10.5)
CALCIUM SPEC-SCNC: 8.7 MG/DL (ref 8.6–10.5)
CALCIUM SPEC-SCNC: 8.8 MG/DL (ref 8.8–10.5)
CALCIUM SPEC-SCNC: 9 MG/DL (ref 8.8–10.5)
CALCIUM SPEC-SCNC: 9 MG/DL (ref 8.8–10.5)
CALCIUM SPEC-SCNC: 9.2 MG/DL (ref 8.8–10.5)
CALCIUM SPEC-SCNC: 9.6 MG/DL (ref 8.8–10.5)
CALCIUM SPEC-SCNC: 9.6 MG/DL (ref 8.8–10.5)
CHLORIDE SERPL-SCNC: 100 MMOL/L (ref 98–107)
CHLORIDE SERPL-SCNC: 101 MMOL/L (ref 98–107)
CHLORIDE SERPL-SCNC: 102 MMOL/L (ref 98–107)
CHLORIDE SERPL-SCNC: 102 MMOL/L (ref 98–107)
CHLORIDE SERPL-SCNC: 103 MMOL/L (ref 98–107)
CHLORIDE SERPL-SCNC: 103 MMOL/L (ref 98–107)
CHLORIDE SERPL-SCNC: 105 MMOL/L (ref 98–107)
CHLORIDE SERPL-SCNC: 105 MMOL/L (ref 98–107)
CHLORIDE SERPL-SCNC: 106 MMOL/L (ref 98–107)
CHLORIDE SERPL-SCNC: 109 MMOL/L (ref 98–107)
CHLORIDE SERPL-SCNC: 110 MMOL/L (ref 98–107)
CHLORIDE SERPL-SCNC: 111 MMOL/L (ref 98–107)
CHLORIDE SERPL-SCNC: 113 MMOL/L (ref 98–107)
CHLORIDE SERPL-SCNC: 116 MMOL/L (ref 98–107)
CHLORIDE SERPL-SCNC: 117 MMOL/L (ref 98–107)
CHLORIDE SERPL-SCNC: 98 MMOL/L (ref 98–107)
CHLORIDE SERPL-SCNC: 99 MMOL/L (ref 98–107)
CLARITY UR: ABNORMAL
CLARITY UR: CLEAR
CO2 SERPL-SCNC: 19.6 MMOL/L (ref 22–29)
CO2 SERPL-SCNC: 21.5 MMOL/L (ref 22–29)
CO2 SERPL-SCNC: 21.6 MMOL/L (ref 22–29)
CO2 SERPL-SCNC: 21.8 MMOL/L (ref 22–29)
CO2 SERPL-SCNC: 22.2 MMOL/L (ref 22–29)
CO2 SERPL-SCNC: 22.9 MMOL/L (ref 22–29)
CO2 SERPL-SCNC: 23.3 MMOL/L (ref 22–29)
CO2 SERPL-SCNC: 24.6 MMOL/L (ref 22–29)
CO2 SERPL-SCNC: 24.7 MMOL/L (ref 22–29)
CO2 SERPL-SCNC: 24.8 MMOL/L (ref 22–29)
CO2 SERPL-SCNC: 24.9 MMOL/L (ref 22–29)
CO2 SERPL-SCNC: 25.2 MMOL/L (ref 22–29)
CO2 SERPL-SCNC: 25.2 MMOL/L (ref 22–29)
CO2 SERPL-SCNC: 25.8 MMOL/L (ref 22–29)
CO2 SERPL-SCNC: 26 MMOL/L (ref 22–29)
CO2 SERPL-SCNC: 26.6 MMOL/L (ref 22–29)
CO2 SERPL-SCNC: 27 MMOL/L (ref 22–29)
CO2 SERPL-SCNC: 27.1 MMOL/L (ref 22–29)
CO2 SERPL-SCNC: 27.2 MMOL/L (ref 22–29)
CO2 SERPL-SCNC: 27.5 MMOL/L (ref 22–29)
CO2 SERPL-SCNC: 27.6 MMOL/L (ref 22–29)
CO2 SERPL-SCNC: 27.6 MMOL/L (ref 22–29)
CO2 SERPL-SCNC: 28.3 MMOL/L (ref 22–29)
COARSE GRAN CASTS URNS QL MICRO: ABNORMAL /LPF
COLOR UR: ABNORMAL
COLOR UR: ABNORMAL
COLOR UR: YELLOW
COLOR UR: YELLOW
CREAT BLD-MCNC: 0.83 MG/DL (ref 0.76–1.27)
CREAT BLD-MCNC: 0.87 MG/DL (ref 0.76–1.27)
CREAT BLD-MCNC: 0.88 MG/DL (ref 0.76–1.27)
CREAT BLD-MCNC: 0.89 MG/DL (ref 0.76–1.27)
CREAT BLD-MCNC: 0.93 MG/DL (ref 0.76–1.27)
CREAT BLD-MCNC: 0.97 MG/DL (ref 0.76–1.27)
CREAT BLD-MCNC: 0.98 MG/DL (ref 0.76–1.27)
CREAT BLD-MCNC: 0.99 MG/DL (ref 0.76–1.27)
CREAT BLD-MCNC: 0.99 MG/DL (ref 0.76–1.27)
CREAT BLD-MCNC: 1.02 MG/DL (ref 0.76–1.27)
CREAT BLD-MCNC: 1.03 MG/DL (ref 0.76–1.27)
CREAT BLD-MCNC: 1.05 MG/DL (ref 0.76–1.27)
CREAT BLD-MCNC: 1.09 MG/DL (ref 0.76–1.27)
CREAT BLD-MCNC: 1.1 MG/DL (ref 0.76–1.27)
CREAT BLD-MCNC: 1.22 MG/DL (ref 0.76–1.27)
CREAT BLD-MCNC: 1.23 MG/DL (ref 0.76–1.27)
CREAT BLD-MCNC: 1.25 MG/DL (ref 0.76–1.27)
CREAT BLD-MCNC: 1.33 MG/DL (ref 0.76–1.27)
CREAT BLD-MCNC: 1.35 MG/DL (ref 0.76–1.27)
CREAT BLD-MCNC: 1.94 MG/DL (ref 0.76–1.27)
CREAT BLD-MCNC: 2.2 MG/DL (ref 0.76–1.27)
D-LACTATE SERPL-SCNC: 0.7 MMOL/L (ref 0.5–2)
DEPRECATED RDW RBC AUTO: 42.8 FL (ref 37–54)
DEPRECATED RDW RBC AUTO: 43 FL (ref 37–54)
DEPRECATED RDW RBC AUTO: 43.4 FL (ref 37–54)
DEPRECATED RDW RBC AUTO: 43.8 FL (ref 37–54)
DEPRECATED RDW RBC AUTO: 44.3 FL (ref 37–54)
DEPRECATED RDW RBC AUTO: 44.3 FL (ref 37–54)
DEPRECATED RDW RBC AUTO: 44.4 FL (ref 37–54)
DEPRECATED RDW RBC AUTO: 44.5 FL (ref 37–54)
DEPRECATED RDW RBC AUTO: 44.8 FL (ref 37–54)
DEPRECATED RDW RBC AUTO: 44.8 FL (ref 37–54)
DEPRECATED RDW RBC AUTO: 45.1 FL (ref 37–54)
DEPRECATED RDW RBC AUTO: 45.1 FL (ref 37–54)
DEPRECATED RDW RBC AUTO: 45.3 FL (ref 37–54)
DEPRECATED RDW RBC AUTO: 46.3 FL (ref 37–54)
DEPRECATED RDW RBC AUTO: 46.8 FL (ref 37–54)
DEPRECATED RDW RBC AUTO: 48 FL (ref 37–54)
DEPRECATED RDW RBC AUTO: 48 FL (ref 37–54)
DEPRECATED RDW RBC AUTO: 48.8 FL (ref 37–54)
DEPRECATED RDW RBC AUTO: 51.1 FL (ref 37–54)
DEPRECATED RDW RBC AUTO: 51.9 FL (ref 37–54)
DEPRECATED RDW RBC AUTO: 53 FL (ref 37–54)
DEPRECATED RDW RBC AUTO: 56.3 FL (ref 37–54)
EOSINOPHIL # BLD AUTO: 0.11 10*3/MM3 (ref 0.1–0.3)
EOSINOPHIL # BLD AUTO: 0.15 10*3/MM3 (ref 0.1–0.3)
EOSINOPHIL # BLD AUTO: 0.21 10*3/MM3 (ref 0.1–0.3)
EOSINOPHIL # BLD AUTO: 0.26 10*3/MM3 (ref 0.1–0.3)
EOSINOPHIL # BLD AUTO: 0.29 10*3/MM3 (ref 0–0.7)
EOSINOPHIL # BLD AUTO: 0.3 10*3/MM3 (ref 0.1–0.3)
EOSINOPHIL # BLD AUTO: 0.31 10*3/MM3 (ref 0.1–0.3)
EOSINOPHIL # BLD AUTO: 0.31 10*3/MM3 (ref 0–0.7)
EOSINOPHIL # BLD AUTO: 0.36 10*3/MM3 (ref 0.1–0.3)
EOSINOPHIL # BLD AUTO: 0.36 10*3/MM3 (ref 0–0.7)
EOSINOPHIL # BLD AUTO: 0.37 10*3/MM3 (ref 0.1–0.3)
EOSINOPHIL # BLD AUTO: 0.39 10*3/MM3 (ref 0.1–0.3)
EOSINOPHIL # BLD AUTO: 0.39 10*3/MM3 (ref 0–0.7)
EOSINOPHIL # BLD AUTO: 0.41 10*3/MM3 (ref 0–0.7)
EOSINOPHIL # BLD AUTO: 0.45 10*3/MM3 (ref 0.1–0.3)
EOSINOPHIL # BLD AUTO: 0.47 10*3/MM3 (ref 0–0.7)
EOSINOPHIL NFR BLD AUTO: 0.8 % (ref 0–4)
EOSINOPHIL NFR BLD AUTO: 1.3 % (ref 0–4)
EOSINOPHIL NFR BLD AUTO: 1.6 % (ref 0–4)
EOSINOPHIL NFR BLD AUTO: 2 % (ref 0–4)
EOSINOPHIL NFR BLD AUTO: 2.4 % (ref 0.3–6.2)
EOSINOPHIL NFR BLD AUTO: 2.6 % (ref 0–4)
EOSINOPHIL NFR BLD AUTO: 2.7 % (ref 0.3–6.2)
EOSINOPHIL NFR BLD AUTO: 2.7 % (ref 0–4)
EOSINOPHIL NFR BLD AUTO: 2.9 % (ref 0.3–6.2)
EOSINOPHIL NFR BLD AUTO: 3 % (ref 0–4)
EOSINOPHIL NFR BLD AUTO: 3.5 % (ref 0.3–6.2)
EOSINOPHIL NFR BLD AUTO: 3.5 % (ref 0–4)
EOSINOPHIL NFR BLD AUTO: 3.7 % (ref 0.3–6.2)
EOSINOPHIL NFR BLD AUTO: 3.8 % (ref 0–4)
EOSINOPHIL NFR BLD AUTO: 4.1 % (ref 0–4)
EOSINOPHIL NFR BLD AUTO: 4.9 % (ref 0.3–6.2)
ERYTHROCYTE [DISTWIDTH] IN BLOOD BY AUTOMATED COUNT: 13.7 % (ref 11.5–14.5)
ERYTHROCYTE [DISTWIDTH] IN BLOOD BY AUTOMATED COUNT: 13.8 % (ref 11.5–14.5)
ERYTHROCYTE [DISTWIDTH] IN BLOOD BY AUTOMATED COUNT: 14 % (ref 11.5–14.5)
ERYTHROCYTE [DISTWIDTH] IN BLOOD BY AUTOMATED COUNT: 14.1 % (ref 11.5–14.5)
ERYTHROCYTE [DISTWIDTH] IN BLOOD BY AUTOMATED COUNT: 14.3 % (ref 11.5–14.5)
ERYTHROCYTE [DISTWIDTH] IN BLOOD BY AUTOMATED COUNT: 14.3 % (ref 11.5–14.5)
ERYTHROCYTE [DISTWIDTH] IN BLOOD BY AUTOMATED COUNT: 14.5 % (ref 11.5–14.5)
ERYTHROCYTE [DISTWIDTH] IN BLOOD BY AUTOMATED COUNT: 14.6 % (ref 11.5–14.5)
ERYTHROCYTE [DISTWIDTH] IN BLOOD BY AUTOMATED COUNT: 15 % (ref 11.5–14.5)
ERYTHROCYTE [DISTWIDTH] IN BLOOD BY AUTOMATED COUNT: 15.2 % (ref 11.5–14.5)
ERYTHROCYTE [DISTWIDTH] IN BLOOD BY AUTOMATED COUNT: 15.6 % (ref 11.5–14.5)
ERYTHROCYTE [DISTWIDTH] IN BLOOD BY AUTOMATED COUNT: 15.8 % (ref 11.5–14.5)
ERYTHROCYTE [DISTWIDTH] IN BLOOD BY AUTOMATED COUNT: 16.2 % (ref 11.5–14.5)
ERYTHROCYTE [DISTWIDTH] IN BLOOD BY AUTOMATED COUNT: 16.3 % (ref 11.5–14.5)
ERYTHROCYTE [DISTWIDTH] IN BLOOD BY AUTOMATED COUNT: 16.7 % (ref 11.5–14.5)
ERYTHROCYTE [DISTWIDTH] IN BLOOD BY AUTOMATED COUNT: 18.6 % (ref 11.5–14.5)
FLUAV AG NPH QL: NEGATIVE
FLUBV AG NPH QL IA: NEGATIVE
GFR SERPL CREATININE-BSD FRML MDRD: 29 ML/MIN/1.73
GFR SERPL CREATININE-BSD FRML MDRD: 34 ML/MIN/1.73
GFR SERPL CREATININE-BSD FRML MDRD: 51 ML/MIN/1.73
GFR SERPL CREATININE-BSD FRML MDRD: 52 ML/MIN/1.73
GFR SERPL CREATININE-BSD FRML MDRD: 56 ML/MIN/1.73
GFR SERPL CREATININE-BSD FRML MDRD: 57 ML/MIN/1.73
GFR SERPL CREATININE-BSD FRML MDRD: 57 ML/MIN/1.73
GFR SERPL CREATININE-BSD FRML MDRD: 65 ML/MIN/1.73
GFR SERPL CREATININE-BSD FRML MDRD: 65 ML/MIN/1.73
GFR SERPL CREATININE-BSD FRML MDRD: 68 ML/MIN/1.73
GFR SERPL CREATININE-BSD FRML MDRD: 70 ML/MIN/1.73
GFR SERPL CREATININE-BSD FRML MDRD: 70 ML/MIN/1.73
GFR SERPL CREATININE-BSD FRML MDRD: 73 ML/MIN/1.73
GFR SERPL CREATININE-BSD FRML MDRD: 73 ML/MIN/1.73
GFR SERPL CREATININE-BSD FRML MDRD: 74 ML/MIN/1.73
GFR SERPL CREATININE-BSD FRML MDRD: 75 ML/MIN/1.73
GFR SERPL CREATININE-BSD FRML MDRD: 79 ML/MIN/1.73
GFR SERPL CREATININE-BSD FRML MDRD: 83 ML/MIN/1.73
GFR SERPL CREATININE-BSD FRML MDRD: 84 ML/MIN/1.73
GFR SERPL CREATININE-BSD FRML MDRD: 85 ML/MIN/1.73
GFR SERPL CREATININE-BSD FRML MDRD: 90 ML/MIN/1.73
GLOBULIN UR ELPH-MCNC: 3 GM/DL
GLOBULIN UR ELPH-MCNC: 3 GM/DL
GLOBULIN UR ELPH-MCNC: 3.1 GM/DL
GLOBULIN UR ELPH-MCNC: 3.1 GM/DL
GLUCOSE BLD-MCNC: 116 MG/DL (ref 65–99)
GLUCOSE BLD-MCNC: 119 MG/DL (ref 65–99)
GLUCOSE BLD-MCNC: 124 MG/DL (ref 65–99)
GLUCOSE BLD-MCNC: 136 MG/DL (ref 65–99)
GLUCOSE BLD-MCNC: 152 MG/DL (ref 65–99)
GLUCOSE BLD-MCNC: 166 MG/DL (ref 65–99)
GLUCOSE BLD-MCNC: 66 MG/DL (ref 65–99)
GLUCOSE BLD-MCNC: 68 MG/DL (ref 65–99)
GLUCOSE BLD-MCNC: 71 MG/DL (ref 65–99)
GLUCOSE BLD-MCNC: 72 MG/DL (ref 65–99)
GLUCOSE BLD-MCNC: 77 MG/DL (ref 65–99)
GLUCOSE BLD-MCNC: 78 MG/DL (ref 65–99)
GLUCOSE BLD-MCNC: 78 MG/DL (ref 65–99)
GLUCOSE BLD-MCNC: 79 MG/DL (ref 65–99)
GLUCOSE BLD-MCNC: 81 MG/DL (ref 65–99)
GLUCOSE BLD-MCNC: 86 MG/DL (ref 65–99)
GLUCOSE BLD-MCNC: 88 MG/DL (ref 65–99)
GLUCOSE BLD-MCNC: 89 MG/DL (ref 65–99)
GLUCOSE BLD-MCNC: 90 MG/DL (ref 65–99)
GLUCOSE BLD-MCNC: 90 MG/DL (ref 65–99)
GLUCOSE BLD-MCNC: 92 MG/DL (ref 65–99)
GLUCOSE BLDC GLUCOMTR-MCNC: 148 MG/DL (ref 70–130)
GLUCOSE BLDC GLUCOMTR-MCNC: 81 MG/DL (ref 70–130)
GLUCOSE BLDC GLUCOMTR-MCNC: 85 MG/DL (ref 70–130)
GLUCOSE UR STRIP-MCNC: NEGATIVE MG/DL
HCO3 BLDA-SCNC: 22.9 MMOL/L (ref 22–28)
HCT VFR BLD AUTO: 33.9 % (ref 42–52)
HCT VFR BLD AUTO: 34 % (ref 40.4–52.2)
HCT VFR BLD AUTO: 34.6 % (ref 42–52)
HCT VFR BLD AUTO: 34.8 % (ref 42–52)
HCT VFR BLD AUTO: 35.2 % (ref 40.4–52.2)
HCT VFR BLD AUTO: 35.4 % (ref 40.4–52.2)
HCT VFR BLD AUTO: 35.8 % (ref 40.4–52.2)
HCT VFR BLD AUTO: 35.9 % (ref 42–52)
HCT VFR BLD AUTO: 36.4 % (ref 40.4–52.2)
HCT VFR BLD AUTO: 36.4 % (ref 40.4–52.2)
HCT VFR BLD AUTO: 36.4 % (ref 42–52)
HCT VFR BLD AUTO: 36.5 % (ref 40.4–52.2)
HCT VFR BLD AUTO: 36.5 % (ref 42–52)
HCT VFR BLD AUTO: 37.1 % (ref 42–52)
HCT VFR BLD AUTO: 37.2 % (ref 40.4–52.2)
HCT VFR BLD AUTO: 37.5 % (ref 42–52)
HCT VFR BLD AUTO: 37.9 % (ref 40.4–52.2)
HCT VFR BLD AUTO: 37.9 % (ref 42–52)
HCT VFR BLD AUTO: 38.2 % (ref 40.4–52.2)
HCT VFR BLD AUTO: 38.2 % (ref 42–52)
HCT VFR BLD AUTO: 41.4 % (ref 42–52)
HCT VFR BLD AUTO: 50.4 % (ref 42–52)
HGB BLD-MCNC: 10.9 G/DL (ref 13.7–17.6)
HGB BLD-MCNC: 11 G/DL (ref 14–18)
HGB BLD-MCNC: 11.2 G/DL (ref 13.7–17.6)
HGB BLD-MCNC: 11.3 G/DL (ref 13.7–17.6)
HGB BLD-MCNC: 11.3 G/DL (ref 14–18)
HGB BLD-MCNC: 11.4 G/DL (ref 13.7–17.6)
HGB BLD-MCNC: 11.5 G/DL (ref 14–18)
HGB BLD-MCNC: 11.5 G/DL (ref 14–18)
HGB BLD-MCNC: 11.6 G/DL (ref 13.7–17.6)
HGB BLD-MCNC: 11.7 G/DL (ref 14–18)
HGB BLD-MCNC: 11.8 G/DL (ref 13.7–17.6)
HGB BLD-MCNC: 11.9 G/DL (ref 13.7–17.6)
HGB BLD-MCNC: 11.9 G/DL (ref 14–18)
HGB BLD-MCNC: 12 G/DL (ref 14–18)
HGB BLD-MCNC: 12.1 G/DL (ref 13.7–17.6)
HGB BLD-MCNC: 12.2 G/DL (ref 14–18)
HGB BLD-MCNC: 13.5 G/DL (ref 14–18)
HGB BLD-MCNC: 15.8 G/DL (ref 14–18)
HGB UR QL STRIP.AUTO: ABNORMAL
HGB UR QL STRIP.AUTO: ABNORMAL
HGB UR QL STRIP.AUTO: NEGATIVE
HGB UR QL STRIP.AUTO: NEGATIVE
HOROWITZ INDEX BLD+IHG-RTO: 21 %
HYALINE CASTS UR QL AUTO: ABNORMAL /LPF
IMM GRANULOCYTES # BLD: 0.02 10*3/MM3 (ref 0–0.03)
IMM GRANULOCYTES # BLD: 0.03 10*3/MM3 (ref 0–0.03)
IMM GRANULOCYTES # BLD: 0.04 10*3/MM3 (ref 0–0.03)
IMM GRANULOCYTES # BLD: 0.04 10*3/MM3 (ref 0–0.03)
IMM GRANULOCYTES # BLD: 0.05 10*3/MM3 (ref 0–0.03)
IMM GRANULOCYTES # BLD: 0.05 10*3/MM3 (ref 0–0.03)
IMM GRANULOCYTES # BLD: 0.06 10*3/MM3 (ref 0–0.03)
IMM GRANULOCYTES # BLD: 0.07 10*3/MM3 (ref 0–0.03)
IMM GRANULOCYTES # BLD: 0.08 10*3/MM3 (ref 0–0.03)
IMM GRANULOCYTES # BLD: 0.09 10*3/MM3 (ref 0–0.03)
IMM GRANULOCYTES # BLD: 0.11 10*3/MM3 (ref 0–0.03)
IMM GRANULOCYTES NFR BLD: 0.2 % (ref 0–0.5)
IMM GRANULOCYTES NFR BLD: 0.3 % (ref 0–0.5)
IMM GRANULOCYTES NFR BLD: 0.3 % (ref 0–0.5)
IMM GRANULOCYTES NFR BLD: 0.4 % (ref 0–0.5)
IMM GRANULOCYTES NFR BLD: 0.4 % (ref 0–0.5)
IMM GRANULOCYTES NFR BLD: 0.5 % (ref 0–0.5)
IMM GRANULOCYTES NFR BLD: 0.6 % (ref 0–0.5)
IMM GRANULOCYTES NFR BLD: 0.7 % (ref 0–0.5)
IMM GRANULOCYTES NFR BLD: 0.7 % (ref 0–0.5)
IMM GRANULOCYTES NFR BLD: 0.8 % (ref 0–0.5)
IMM GRANULOCYTES NFR BLD: 0.8 % (ref 0–0.5)
INR PPP: 1.22 (ref 0.9–1.1)
KETONES UR QL STRIP: ABNORMAL
KETONES UR QL STRIP: NEGATIVE
LEUKOCYTE ESTERASE UR QL STRIP.AUTO: ABNORMAL
LEUKOCYTE ESTERASE UR QL STRIP.AUTO: NEGATIVE
LYMPHOCYTES # BLD AUTO: 1.29 10*3/MM3 (ref 0.6–4.8)
LYMPHOCYTES # BLD AUTO: 1.7 10*3/MM3 (ref 0.9–4.8)
LYMPHOCYTES # BLD AUTO: 1.73 10*3/MM3 (ref 0.6–4.8)
LYMPHOCYTES # BLD AUTO: 1.77 10*3/MM3 (ref 0.6–4.8)
LYMPHOCYTES # BLD AUTO: 1.83 10*3/MM3 (ref 0.9–4.8)
LYMPHOCYTES # BLD AUTO: 1.84 10*3/MM3 (ref 0.9–4.8)
LYMPHOCYTES # BLD AUTO: 1.85 10*3/MM3 (ref 0.6–4.8)
LYMPHOCYTES # BLD AUTO: 1.96 10*3/MM3 (ref 0.9–4.8)
LYMPHOCYTES # BLD AUTO: 2.15 10*3/MM3 (ref 0.6–4.8)
LYMPHOCYTES # BLD AUTO: 2.22 10*3/MM3 (ref 0.9–4.8)
LYMPHOCYTES # BLD AUTO: 2.23 10*3/MM3 (ref 0.6–4.8)
LYMPHOCYTES # BLD AUTO: 2.25 10*3/MM3 (ref 0.9–4.8)
LYMPHOCYTES # BLD AUTO: 2.44 10*3/MM3 (ref 0.6–4.8)
LYMPHOCYTES # BLD AUTO: 2.62 10*3/MM3 (ref 0.6–4.8)
LYMPHOCYTES # BLD AUTO: 2.65 10*3/MM3 (ref 0.6–4.8)
LYMPHOCYTES # BLD AUTO: 2.87 10*3/MM3 (ref 0.6–4.8)
LYMPHOCYTES NFR BLD AUTO: 11.1 % (ref 20–45)
LYMPHOCYTES NFR BLD AUTO: 13.3 % (ref 20–45)
LYMPHOCYTES NFR BLD AUTO: 13.7 % (ref 20–45)
LYMPHOCYTES NFR BLD AUTO: 14.4 % (ref 19.6–45.3)
LYMPHOCYTES NFR BLD AUTO: 14.8 % (ref 19.6–45.3)
LYMPHOCYTES NFR BLD AUTO: 15.2 % (ref 19.6–45.3)
LYMPHOCYTES NFR BLD AUTO: 17.4 % (ref 20–45)
LYMPHOCYTES NFR BLD AUTO: 18.5 % (ref 20–45)
LYMPHOCYTES NFR BLD AUTO: 18.9 % (ref 19.6–45.3)
LYMPHOCYTES NFR BLD AUTO: 18.9 % (ref 20–45)
LYMPHOCYTES NFR BLD AUTO: 19.4 % (ref 19.6–45.3)
LYMPHOCYTES NFR BLD AUTO: 20.4 % (ref 20–45)
LYMPHOCYTES NFR BLD AUTO: 22.7 % (ref 20–45)
LYMPHOCYTES NFR BLD AUTO: 23 % (ref 19.6–45.3)
LYMPHOCYTES NFR BLD AUTO: 23.6 % (ref 20–45)
LYMPHOCYTES NFR BLD AUTO: 25.8 % (ref 20–45)
MAGNESIUM SERPL-MCNC: 2 MG/DL (ref 1.6–2.4)
MAGNESIUM SERPL-MCNC: 2.1 MG/DL (ref 1.6–2.4)
MAGNESIUM SERPL-MCNC: 3 MG/DL (ref 1.7–2.5)
MCH RBC QN AUTO: 27.2 PG (ref 27–31)
MCH RBC QN AUTO: 27.2 PG (ref 27–31)
MCH RBC QN AUTO: 27.3 PG (ref 27–31)
MCH RBC QN AUTO: 27.3 PG (ref 27–32.7)
MCH RBC QN AUTO: 27.4 PG (ref 27–31)
MCH RBC QN AUTO: 27.5 PG (ref 27–31)
MCH RBC QN AUTO: 27.6 PG (ref 27–31)
MCH RBC QN AUTO: 27.6 PG (ref 27–31)
MCH RBC QN AUTO: 27.6 PG (ref 27–32.7)
MCH RBC QN AUTO: 27.6 PG (ref 27–32.7)
MCH RBC QN AUTO: 27.9 PG (ref 27–31)
MCH RBC QN AUTO: 27.9 PG (ref 27–31)
MCH RBC QN AUTO: 28 PG (ref 27–32.7)
MCH RBC QN AUTO: 28 PG (ref 27–32.7)
MCH RBC QN AUTO: 28.1 PG (ref 27–32.7)
MCH RBC QN AUTO: 28.1 PG (ref 27–32.7)
MCH RBC QN AUTO: 28.2 PG (ref 27–32.7)
MCH RBC QN AUTO: 28.2 PG (ref 27–32.7)
MCH RBC QN AUTO: 28.3 PG (ref 27–31)
MCH RBC QN AUTO: 28.3 PG (ref 27–32.7)
MCHC RBC AUTO-ENTMCNC: 31.2 G/DL (ref 31–37)
MCHC RBC AUTO-ENTMCNC: 31.3 G/DL (ref 31–37)
MCHC RBC AUTO-ENTMCNC: 31.3 G/DL (ref 32.6–36.4)
MCHC RBC AUTO-ENTMCNC: 31.7 G/DL (ref 32.6–36.4)
MCHC RBC AUTO-ENTMCNC: 31.8 G/DL (ref 32.6–36.4)
MCHC RBC AUTO-ENTMCNC: 31.9 G/DL (ref 32.6–36.4)
MCHC RBC AUTO-ENTMCNC: 32 G/DL (ref 31–37)
MCHC RBC AUTO-ENTMCNC: 32 G/DL (ref 31–37)
MCHC RBC AUTO-ENTMCNC: 32.1 G/DL (ref 31–37)
MCHC RBC AUTO-ENTMCNC: 32.1 G/DL (ref 32.6–36.4)
MCHC RBC AUTO-ENTMCNC: 32.1 G/DL (ref 32.6–36.4)
MCHC RBC AUTO-ENTMCNC: 32.2 G/DL (ref 31–37)
MCHC RBC AUTO-ENTMCNC: 32.2 G/DL (ref 32.6–36.4)
MCHC RBC AUTO-ENTMCNC: 32.3 G/DL (ref 31–37)
MCHC RBC AUTO-ENTMCNC: 32.4 G/DL (ref 31–37)
MCHC RBC AUTO-ENTMCNC: 32.4 G/DL (ref 32.6–36.4)
MCHC RBC AUTO-ENTMCNC: 32.5 G/DL (ref 32.6–36.4)
MCHC RBC AUTO-ENTMCNC: 32.6 G/DL (ref 31–37)
MCHC RBC AUTO-ENTMCNC: 32.7 G/DL (ref 31–37)
MCHC RBC AUTO-ENTMCNC: 32.7 G/DL (ref 32.6–36.4)
MCHC RBC AUTO-ENTMCNC: 32.9 G/DL (ref 31–37)
MCHC RBC AUTO-ENTMCNC: 33 G/DL (ref 31–37)
MCV RBC AUTO: 83.9 FL (ref 80–94)
MCV RBC AUTO: 84 FL (ref 80–94)
MCV RBC AUTO: 84.1 FL (ref 80–94)
MCV RBC AUTO: 84.5 FL (ref 80–94)
MCV RBC AUTO: 84.7 FL (ref 80–94)
MCV RBC AUTO: 84.9 FL (ref 80–94)
MCV RBC AUTO: 84.9 FL (ref 80–94)
MCV RBC AUTO: 85.7 FL (ref 80–94)
MCV RBC AUTO: 86.1 FL (ref 79.8–96.2)
MCV RBC AUTO: 86.3 FL (ref 79.8–96.2)
MCV RBC AUTO: 86.3 FL (ref 79.8–96.2)
MCV RBC AUTO: 86.8 FL (ref 80–94)
MCV RBC AUTO: 86.9 FL (ref 79.8–96.2)
MCV RBC AUTO: 87.1 FL (ref 79.8–96.2)
MCV RBC AUTO: 87.2 FL (ref 79.8–96.2)
MCV RBC AUTO: 87.2 FL (ref 79.8–96.2)
MCV RBC AUTO: 87.2 FL (ref 80–94)
MCV RBC AUTO: 87.3 FL (ref 79.8–96.2)
MCV RBC AUTO: 87.5 FL (ref 80–94)
MCV RBC AUTO: 87.7 FL (ref 79.8–96.2)
MCV RBC AUTO: 87.8 FL (ref 80–94)
MCV RBC AUTO: 89 FL (ref 79.8–96.2)
MODALITY: ABNORMAL
MONOCYTES # BLD AUTO: 0.49 10*3/MM3 (ref 0.2–1.2)
MONOCYTES # BLD AUTO: 0.6 10*3/MM3 (ref 0–1)
MONOCYTES # BLD AUTO: 0.69 10*3/MM3 (ref 0–1)
MONOCYTES # BLD AUTO: 0.71 10*3/MM3 (ref 0.2–1.2)
MONOCYTES # BLD AUTO: 0.72 10*3/MM3 (ref 0–1)
MONOCYTES # BLD AUTO: 0.78 10*3/MM3 (ref 0–1)
MONOCYTES # BLD AUTO: 0.79 10*3/MM3 (ref 0–1)
MONOCYTES # BLD AUTO: 0.83 10*3/MM3 (ref 0–1)
MONOCYTES # BLD AUTO: 0.84 10*3/MM3 (ref 0–1)
MONOCYTES # BLD AUTO: 0.87 10*3/MM3 (ref 0.2–1.2)
MONOCYTES # BLD AUTO: 0.87 10*3/MM3 (ref 0.2–1.2)
MONOCYTES # BLD AUTO: 0.89 10*3/MM3 (ref 0.2–1.2)
MONOCYTES # BLD AUTO: 0.89 10*3/MM3 (ref 0–1)
MONOCYTES # BLD AUTO: 0.9 10*3/MM3 (ref 0.2–1.2)
MONOCYTES # BLD AUTO: 1.02 10*3/MM3 (ref 0–1)
MONOCYTES # BLD AUTO: 1.14 10*3/MM3 (ref 0–1)
MONOCYTES NFR BLD AUTO: 4.6 % (ref 3–8)
MONOCYTES NFR BLD AUTO: 5.1 % (ref 5–12)
MONOCYTES NFR BLD AUTO: 5.6 % (ref 3–8)
MONOCYTES NFR BLD AUTO: 6.5 % (ref 3–8)
MONOCYTES NFR BLD AUTO: 6.6 % (ref 5–12)
MONOCYTES NFR BLD AUTO: 6.8 % (ref 3–8)
MONOCYTES NFR BLD AUTO: 6.9 % (ref 3–8)
MONOCYTES NFR BLD AUTO: 7.1 % (ref 3–8)
MONOCYTES NFR BLD AUTO: 7.3 % (ref 5–12)
MONOCYTES NFR BLD AUTO: 7.5 % (ref 5–12)
MONOCYTES NFR BLD AUTO: 7.5 % (ref 5–12)
MONOCYTES NFR BLD AUTO: 7.6 % (ref 5–12)
MONOCYTES NFR BLD AUTO: 7.9 % (ref 3–8)
MONOCYTES NFR BLD AUTO: 8.2 % (ref 3–8)
MONOCYTES NFR BLD AUTO: 8.6 % (ref 3–8)
MONOCYTES NFR BLD AUTO: 8.7 % (ref 3–8)
MRSA SPEC QL CULT: NORMAL
MUCOUS THREADS URNS QL MICRO: ABNORMAL /HPF
NEUTROPHILS # BLD AUTO: 10.13 10*3/MM3 (ref 1.5–8.3)
NEUTROPHILS # BLD AUTO: 10.19 10*3/MM3 (ref 1.5–8.3)
NEUTROPHILS # BLD AUTO: 10.25 10*3/MM3 (ref 1.9–8.1)
NEUTROPHILS # BLD AUTO: 6.34 10*3/MM3 (ref 1.5–8.3)
NEUTROPHILS # BLD AUTO: 6.38 10*3/MM3 (ref 1.9–8.1)
NEUTROPHILS # BLD AUTO: 6.72 10*3/MM3 (ref 1.9–8.1)
NEUTROPHILS # BLD AUTO: 7.21 10*3/MM3 (ref 1.5–8.3)
NEUTROPHILS # BLD AUTO: 7.42 10*3/MM3 (ref 1.5–8.3)
NEUTROPHILS # BLD AUTO: 7.7 10*3/MM3 (ref 1.5–8.3)
NEUTROPHILS # BLD AUTO: 7.96 10*3/MM3 (ref 1.5–8.3)
NEUTROPHILS # BLD AUTO: 7.97 10*3/MM3 (ref 1.5–8.3)
NEUTROPHILS # BLD AUTO: 7.99 10*3/MM3 (ref 1.9–8.1)
NEUTROPHILS # BLD AUTO: 8.56 10*3/MM3 (ref 1.9–8.1)
NEUTROPHILS # BLD AUTO: 8.98 10*3/MM3 (ref 1.9–8.1)
NEUTROPHILS # BLD AUTO: 9.1 10*3/MM3 (ref 1.5–8.3)
NEUTROPHILS # BLD AUTO: 9.37 10*3/MM3 (ref 1.5–8.3)
NEUTROPHILS NFR BLD AUTO: 62.3 % (ref 45–70)
NEUTROPHILS NFR BLD AUTO: 65.6 % (ref 45–70)
NEUTROPHILS NFR BLD AUTO: 65.9 % (ref 42.7–76)
NEUTROPHILS NFR BLD AUTO: 66 % (ref 45–70)
NEUTROPHILS NFR BLD AUTO: 66.1 % (ref 45–70)
NEUTROPHILS NFR BLD AUTO: 68.8 % (ref 42.7–76)
NEUTROPHILS NFR BLD AUTO: 69.2 % (ref 42.7–76)
NEUTROPHILS NFR BLD AUTO: 69.9 % (ref 45–70)
NEUTROPHILS NFR BLD AUTO: 70 % (ref 45–70)
NEUTROPHILS NFR BLD AUTO: 71 % (ref 45–70)
NEUTROPHILS NFR BLD AUTO: 74.3 % (ref 42.7–76)
NEUTROPHILS NFR BLD AUTO: 74.5 % (ref 42.7–76)
NEUTROPHILS NFR BLD AUTO: 75.5 % (ref 42.7–76)
NEUTROPHILS NFR BLD AUTO: 78 % (ref 45–70)
NEUTROPHILS NFR BLD AUTO: 78.1 % (ref 45–70)
NEUTROPHILS NFR BLD AUTO: 78.7 % (ref 45–70)
NITRITE UR QL STRIP: NEGATIVE
NITRITE UR QL STRIP: POSITIVE
NRBC BLD MANUAL-RTO: 0 /100 WBC (ref 0–0)
O2 A-A PPRESDIFF RESPIRATORY: 0.6 MMHG
PCO2 BLDA: 32.8 MM HG (ref 35–45)
PH BLDA: 7.45 PH UNITS (ref 7.35–7.45)
PH UR STRIP.AUTO: 7.5 [PH] (ref 5–8)
PH UR STRIP.AUTO: <=5 [PH] (ref 4.5–8)
PLATELET # BLD AUTO: 213 10*3/MM3 (ref 140–500)
PLATELET # BLD AUTO: 222 10*3/MM3 (ref 140–500)
PLATELET # BLD AUTO: 244 10*3/MM3 (ref 140–500)
PLATELET # BLD AUTO: 247 10*3/MM3 (ref 140–500)
PLATELET # BLD AUTO: 259 10*3/MM3 (ref 140–500)
PLATELET # BLD AUTO: 265 10*3/MM3 (ref 140–500)
PLATELET # BLD AUTO: 267 10*3/MM3 (ref 140–500)
PLATELET # BLD AUTO: 274 10*3/MM3 (ref 140–500)
PLATELET # BLD AUTO: 284 10*3/MM3 (ref 140–500)
PLATELET # BLD AUTO: 287 10*3/MM3 (ref 140–500)
PLATELET # BLD AUTO: 292 10*3/MM3 (ref 140–500)
PLATELET # BLD AUTO: 293 10*3/MM3 (ref 140–500)
PLATELET # BLD AUTO: 293 10*3/MM3 (ref 140–500)
PLATELET # BLD AUTO: 301 10*3/MM3 (ref 140–500)
PLATELET # BLD AUTO: 302 10*3/MM3 (ref 140–500)
PLATELET # BLD AUTO: 308 10*3/MM3 (ref 140–500)
PLATELET # BLD AUTO: 308 10*3/MM3 (ref 140–500)
PLATELET # BLD AUTO: 310 10*3/MM3 (ref 140–500)
PLATELET # BLD AUTO: 326 10*3/MM3 (ref 140–500)
PLATELET # BLD AUTO: 326 10*3/MM3 (ref 140–500)
PLATELET # BLD AUTO: 356 10*3/MM3 (ref 140–500)
PLATELET # BLD AUTO: 386 10*3/MM3 (ref 140–500)
PMV BLD AUTO: 10.5 FL (ref 7.4–10.4)
PMV BLD AUTO: 9.1 FL (ref 6–12)
PMV BLD AUTO: 9.1 FL (ref 7.4–10.4)
PMV BLD AUTO: 9.1 FL (ref 7.4–10.4)
PMV BLD AUTO: 9.3 FL (ref 6–12)
PMV BLD AUTO: 9.3 FL (ref 6–12)
PMV BLD AUTO: 9.3 FL (ref 7.4–10.4)
PMV BLD AUTO: 9.4 FL (ref 6–12)
PMV BLD AUTO: 9.5 FL (ref 6–12)
PMV BLD AUTO: 9.5 FL (ref 7.4–10.4)
PMV BLD AUTO: 9.5 FL (ref 7.4–10.4)
PMV BLD AUTO: 9.6 FL (ref 6–12)
PMV BLD AUTO: 9.6 FL (ref 6–12)
PMV BLD AUTO: 9.6 FL (ref 7.4–10.4)
PMV BLD AUTO: 9.6 FL (ref 7.4–10.4)
PMV BLD AUTO: 9.7 FL (ref 6–12)
PMV BLD AUTO: 9.7 FL (ref 7.4–10.4)
PMV BLD AUTO: 9.8 FL (ref 7.4–10.4)
PMV BLD AUTO: 9.8 FL (ref 7.4–10.4)
PMV BLD AUTO: 9.9 FL (ref 7.4–10.4)
PO2 BLDA: 66.8 MM HG (ref 80–100)
POTASSIUM BLD-SCNC: 3.6 MMOL/L (ref 3.5–5.2)
POTASSIUM BLD-SCNC: 3.6 MMOL/L (ref 3.5–5.2)
POTASSIUM BLD-SCNC: 3.7 MMOL/L (ref 3.5–5.2)
POTASSIUM BLD-SCNC: 3.8 MMOL/L (ref 3.5–5.2)
POTASSIUM BLD-SCNC: 3.9 MMOL/L (ref 3.5–5.2)
POTASSIUM BLD-SCNC: 3.9 MMOL/L (ref 3.5–5.2)
POTASSIUM BLD-SCNC: 4 MMOL/L (ref 3.5–5.2)
POTASSIUM BLD-SCNC: 4 MMOL/L (ref 3.5–5.2)
POTASSIUM BLD-SCNC: 4.1 MMOL/L (ref 3.5–5.2)
POTASSIUM BLD-SCNC: 4.1 MMOL/L (ref 3.5–5.2)
POTASSIUM BLD-SCNC: 4.2 MMOL/L (ref 3.5–5.2)
POTASSIUM BLD-SCNC: 4.4 MMOL/L (ref 3.5–5.2)
POTASSIUM BLD-SCNC: 4.4 MMOL/L (ref 3.5–5.2)
POTASSIUM BLD-SCNC: 4.5 MMOL/L (ref 3.5–5.2)
POTASSIUM BLD-SCNC: 4.6 MMOL/L (ref 3.5–5.2)
POTASSIUM BLD-SCNC: 5.1 MMOL/L (ref 3.5–5.2)
PROCALCITONIN SERPL-MCNC: 0.05 NG/ML (ref 0.1–0.25)
PROT SERPL-MCNC: 6 G/DL (ref 6–8.5)
PROT SERPL-MCNC: 6.1 G/DL (ref 6–8.5)
PROT SERPL-MCNC: 6.1 G/DL (ref 6–8.5)
PROT SERPL-MCNC: 6.9 G/DL (ref 6–8.5)
PROT UR QL STRIP: ABNORMAL
PROT UR QL STRIP: NEGATIVE
PROTHROMBIN TIME: 15 SECONDS (ref 11.7–14.2)
RBC # BLD AUTO: 3.95 10*6/MM3 (ref 4.6–6)
RBC # BLD AUTO: 4.03 10*6/MM3 (ref 4.7–6.1)
RBC # BLD AUTO: 4.04 10*6/MM3 (ref 4.6–6)
RBC # BLD AUTO: 4.06 10*6/MM3 (ref 4.6–6)
RBC # BLD AUTO: 4.1 10*6/MM3 (ref 4.6–6)
RBC # BLD AUTO: 4.1 10*6/MM3 (ref 4.6–6)
RBC # BLD AUTO: 4.12 10*6/MM3 (ref 4.7–6.1)
RBC # BLD AUTO: 4.12 10*6/MM3 (ref 4.7–6.1)
RBC # BLD AUTO: 4.19 10*6/MM3 (ref 4.6–6)
RBC # BLD AUTO: 4.22 10*6/MM3 (ref 4.6–6)
RBC # BLD AUTO: 4.23 10*6/MM3 (ref 4.7–6.1)
RBC # BLD AUTO: 4.3 10*6/MM3 (ref 4.7–6.1)
RBC # BLD AUTO: 4.3 10*6/MM3 (ref 4.7–6.1)
RBC # BLD AUTO: 4.31 10*6/MM3 (ref 4.6–6)
RBC # BLD AUTO: 4.32 10*6/MM3 (ref 4.6–6)
RBC # BLD AUTO: 4.35 10*6/MM3 (ref 4.7–6.1)
RBC # BLD AUTO: 4.35 10*6/MM3 (ref 4.7–6.1)
RBC # BLD AUTO: 4.37 10*6/MM3 (ref 4.7–6.1)
RBC # BLD AUTO: 4.38 10*6/MM3 (ref 4.6–6)
RBC # BLD AUTO: 4.42 10*6/MM3 (ref 4.7–6.1)
RBC # BLD AUTO: 4.77 10*6/MM3 (ref 4.7–6.1)
RBC # BLD AUTO: 5.76 10*6/MM3 (ref 4.7–6.1)
RBC # UR: ABNORMAL /HPF
REF LAB TEST METHOD: ABNORMAL
SAO2 % BLDCOA: 94.1 % (ref 92–99)
SODIUM BLD-SCNC: 134 MMOL/L (ref 136–145)
SODIUM BLD-SCNC: 135 MMOL/L (ref 136–145)
SODIUM BLD-SCNC: 135 MMOL/L (ref 136–145)
SODIUM BLD-SCNC: 137 MMOL/L (ref 136–145)
SODIUM BLD-SCNC: 137 MMOL/L (ref 136–145)
SODIUM BLD-SCNC: 138 MMOL/L (ref 136–145)
SODIUM BLD-SCNC: 139 MMOL/L (ref 136–145)
SODIUM BLD-SCNC: 140 MMOL/L (ref 136–145)
SODIUM BLD-SCNC: 141 MMOL/L (ref 136–145)
SODIUM BLD-SCNC: 143 MMOL/L (ref 136–145)
SODIUM BLD-SCNC: 144 MMOL/L (ref 136–145)
SODIUM BLD-SCNC: 144 MMOL/L (ref 136–145)
SODIUM BLD-SCNC: 147 MMOL/L (ref 136–145)
SODIUM BLD-SCNC: 152 MMOL/L (ref 136–145)
SODIUM BLD-SCNC: 159 MMOL/L (ref 136–145)
SODIUM BLD-SCNC: 160 MMOL/L (ref 136–145)
SP GR UR STRIP: 1.01 (ref 1–1.03)
SP GR UR STRIP: 1.02 (ref 1–1.03)
SP GR UR STRIP: 1.02 (ref 1–1.03)
SP GR UR STRIP: 1.03 (ref 1–1.03)
SQUAMOUS #/AREA URNS HPF: ABNORMAL /HPF
TOTAL RATE: 19 BREATHS/MINUTE
TSH SERPL DL<=0.05 MIU/L-ACNC: 3.34 MIU/ML (ref 0.27–4.2)
URATE CRY URNS QL MICRO: ABNORMAL /HPF
UROBILINOGEN UR QL STRIP: ABNORMAL
UROBILINOGEN UR QL STRIP: NORMAL
VRE SPEC QL CULT: NORMAL
WBC NRBC COR # BLD: 10.17 10*3/MM3 (ref 4.8–10.8)
WBC NRBC COR # BLD: 10.61 10*3/MM3 (ref 4.8–10.8)
WBC NRBC COR # BLD: 10.92 10*3/MM3 (ref 4.8–10.8)
WBC NRBC COR # BLD: 10.98 10*3/MM3 (ref 4.5–10.7)
WBC NRBC COR # BLD: 11.37 10*3/MM3 (ref 4.8–10.8)
WBC NRBC COR # BLD: 11.51 10*3/MM3 (ref 4.5–10.7)
WBC NRBC COR # BLD: 11.62 10*3/MM3 (ref 4.5–10.7)
WBC NRBC COR # BLD: 11.64 10*3/MM3 (ref 4.5–10.7)
WBC NRBC COR # BLD: 11.65 10*3/MM3 (ref 4.8–10.8)
WBC NRBC COR # BLD: 11.67 10*3/MM3 (ref 4.8–10.8)
WBC NRBC COR # BLD: 11.76 10*3/MM3 (ref 4.8–10.8)
WBC NRBC COR # BLD: 12.05 10*3/MM3 (ref 4.5–10.7)
WBC NRBC COR # BLD: 12.11 10*3/MM3 (ref 4.5–10.7)
WBC NRBC COR # BLD: 12.14 10*3/MM3 (ref 4.8–10.8)
WBC NRBC COR # BLD: 12.67 10*3/MM3 (ref 4.8–10.8)
WBC NRBC COR # BLD: 12.96 10*3/MM3 (ref 4.8–10.8)
WBC NRBC COR # BLD: 12.96 10*3/MM3 (ref 4.8–10.8)
WBC NRBC COR # BLD: 13.21 10*3/MM3 (ref 4.8–10.8)
WBC NRBC COR # BLD: 13.57 10*3/MM3 (ref 4.5–10.7)
WBC NRBC COR # BLD: 9.67 10*3/MM3 (ref 4.5–10.7)
WBC NRBC COR # BLD: 9.72 10*3/MM3 (ref 4.5–10.7)
WBC NRBC COR # BLD: 9.85 10*3/MM3 (ref 4.5–10.7)
WBC UR QL AUTO: ABNORMAL /HPF

## 2018-01-01 PROCEDURE — 84145 PROCALCITONIN (PCT): CPT | Performed by: EMERGENCY MEDICINE

## 2018-01-01 PROCEDURE — 85025 COMPLETE CBC W/AUTO DIFF WBC: CPT | Performed by: HOSPITALIST

## 2018-01-01 PROCEDURE — 99232 SBSQ HOSP IP/OBS MODERATE 35: CPT | Performed by: PSYCHIATRY & NEUROLOGY

## 2018-01-01 PROCEDURE — 25010000002 PIPERACILLIN SOD-TAZOBACTAM PER 1 G: Performed by: EMERGENCY MEDICINE

## 2018-01-01 PROCEDURE — 71045 X-RAY EXAM CHEST 1 VIEW: CPT

## 2018-01-01 PROCEDURE — 83735 ASSAY OF MAGNESIUM: CPT | Performed by: INTERNAL MEDICINE

## 2018-01-01 PROCEDURE — 99308 SBSQ NF CARE LOW MDM 20: CPT | Performed by: FAMILY MEDICINE

## 2018-01-01 PROCEDURE — 81003 URINALYSIS AUTO W/O SCOPE: CPT | Performed by: EMERGENCY MEDICINE

## 2018-01-01 PROCEDURE — 80048 BASIC METABOLIC PNL TOTAL CA: CPT | Performed by: NURSE PRACTITIONER

## 2018-01-01 PROCEDURE — 99221 1ST HOSP IP/OBS SF/LOW 40: CPT | Performed by: INTERNAL MEDICINE

## 2018-01-01 PROCEDURE — 25010000002 PIPERACILLIN SOD-TAZOBACTAM PER 1 G: Performed by: HOSPITALIST

## 2018-01-01 PROCEDURE — 99309 SBSQ NF CARE MODERATE MDM 30: CPT | Performed by: NURSE PRACTITIONER

## 2018-01-01 PROCEDURE — 80048 BASIC METABOLIC PNL TOTAL CA: CPT | Performed by: HOSPITALIST

## 2018-01-01 PROCEDURE — 80048 BASIC METABOLIC PNL TOTAL CA: CPT | Performed by: INTERNAL MEDICINE

## 2018-01-01 PROCEDURE — 99308 SBSQ NF CARE LOW MDM 20: CPT | Performed by: NURSE PRACTITIONER

## 2018-01-01 PROCEDURE — 74230 X-RAY XM SWLNG FUNCJ C+: CPT

## 2018-01-01 PROCEDURE — 99308 SBSQ NF CARE LOW MDM 20: CPT | Performed by: INTERNAL MEDICINE

## 2018-01-01 PROCEDURE — 92610 EVALUATE SWALLOWING FUNCTION: CPT

## 2018-01-01 PROCEDURE — 97110 THERAPEUTIC EXERCISES: CPT

## 2018-01-01 PROCEDURE — 94799 UNLISTED PULMONARY SVC/PX: CPT

## 2018-01-01 PROCEDURE — 81001 URINALYSIS AUTO W/SCOPE: CPT | Performed by: NURSE PRACTITIONER

## 2018-01-01 PROCEDURE — 70450 CT HEAD/BRAIN W/O DYE: CPT

## 2018-01-01 PROCEDURE — 85025 COMPLETE CBC W/AUTO DIFF WBC: CPT | Performed by: NURSE PRACTITIONER

## 2018-01-01 PROCEDURE — 0298T HOLTER MONITOR - 72 HOUR UP TO 21 DAY: CPT | Performed by: INTERNAL MEDICINE

## 2018-01-01 PROCEDURE — 92526 ORAL FUNCTION THERAPY: CPT

## 2018-01-01 PROCEDURE — 97116 GAIT TRAINING THERAPY: CPT | Performed by: PHYSICAL THERAPIST

## 2018-01-01 PROCEDURE — 97530 THERAPEUTIC ACTIVITIES: CPT

## 2018-01-01 PROCEDURE — 25010000002 ENOXAPARIN PER 10 MG: Performed by: INTERNAL MEDICINE

## 2018-01-01 PROCEDURE — 85027 COMPLETE CBC AUTOMATED: CPT | Performed by: HOSPITALIST

## 2018-01-01 PROCEDURE — 25810000003 SODIUM CHLORIDE 0.9 % WITH KCL 20 MEQ 20-0.9 MEQ/L-% SOLUTION: Performed by: INTERNAL MEDICINE

## 2018-01-01 PROCEDURE — 82306 VITAMIN D 25 HYDROXY: CPT | Performed by: NURSE PRACTITIONER

## 2018-01-01 PROCEDURE — 25010000002 PIPERACILLIN SOD-TAZOBACTAM PER 1 G: Performed by: INTERNAL MEDICINE

## 2018-01-01 PROCEDURE — 95819 EEG AWAKE AND ASLEEP: CPT

## 2018-01-01 PROCEDURE — 82962 GLUCOSE BLOOD TEST: CPT

## 2018-01-01 PROCEDURE — 99316 NF DSCHRG MGMT 30 MIN+: CPT | Performed by: INTERNAL MEDICINE

## 2018-01-01 PROCEDURE — 97167 OT EVAL HIGH COMPLEX 60 MIN: CPT

## 2018-01-01 PROCEDURE — 99310 SBSQ NF CARE HIGH MDM 45: CPT | Performed by: NURSE PRACTITIONER

## 2018-01-01 PROCEDURE — 74018 RADEX ABDOMEN 1 VIEW: CPT

## 2018-01-01 PROCEDURE — 0296T HC EXT ECG > 48HR TO 21 DAY RCRD W/CONECT INTL RCRD: CPT

## 2018-01-01 PROCEDURE — A9577 INJ MULTIHANCE: HCPCS | Performed by: INTERNAL MEDICINE

## 2018-01-01 PROCEDURE — 99221 1ST HOSP IP/OBS SF/LOW 40: CPT | Performed by: PSYCHIATRY & NEUROLOGY

## 2018-01-01 PROCEDURE — G8996 SWALLOW CURRENT STATUS: HCPCS

## 2018-01-01 PROCEDURE — 83735 ASSAY OF MAGNESIUM: CPT | Performed by: EMERGENCY MEDICINE

## 2018-01-01 PROCEDURE — 99307 SBSQ NF CARE SF MDM 10: CPT | Performed by: FAMILY MEDICINE

## 2018-01-01 PROCEDURE — 97530 THERAPEUTIC ACTIVITIES: CPT | Performed by: PHYSICAL THERAPIST

## 2018-01-01 PROCEDURE — 85025 COMPLETE CBC W/AUTO DIFF WBC: CPT | Performed by: INTERNAL MEDICINE

## 2018-01-01 PROCEDURE — 99310 SBSQ NF CARE HIGH MDM 45: CPT | Performed by: INTERNAL MEDICINE

## 2018-01-01 PROCEDURE — 87081 CULTURE SCREEN ONLY: CPT | Performed by: HOSPITALIST

## 2018-01-01 PROCEDURE — 99356 PR PROLONGED SVC I/P OR OBS SETTING 1ST HOUR: CPT | Performed by: INTERNAL MEDICINE

## 2018-01-01 PROCEDURE — G8997 SWALLOW GOAL STATUS: HCPCS

## 2018-01-01 PROCEDURE — 87804 INFLUENZA ASSAY W/OPTIC: CPT | Performed by: EMERGENCY MEDICINE

## 2018-01-01 PROCEDURE — 99305 1ST NF CARE MODERATE MDM 35: CPT | Performed by: HOSPITALIST

## 2018-01-01 PROCEDURE — 99308 SBSQ NF CARE LOW MDM 20: CPT | Performed by: HOSPITALIST

## 2018-01-01 PROCEDURE — 87086 URINE CULTURE/COLONY COUNT: CPT | Performed by: NURSE PRACTITIONER

## 2018-01-01 PROCEDURE — 99285 EMERGENCY DEPT VISIT HI MDM: CPT

## 2018-01-01 PROCEDURE — 70553 MRI BRAIN STEM W/O & W/DYE: CPT

## 2018-01-01 PROCEDURE — 80053 COMPREHEN METABOLIC PANEL: CPT | Performed by: NURSE PRACTITIONER

## 2018-01-01 PROCEDURE — 82803 BLOOD GASES ANY COMBINATION: CPT

## 2018-01-01 PROCEDURE — 84443 ASSAY THYROID STIM HORMONE: CPT | Performed by: NURSE PRACTITIONER

## 2018-01-01 PROCEDURE — 83735 ASSAY OF MAGNESIUM: CPT | Performed by: NURSE PRACTITIONER

## 2018-01-01 PROCEDURE — 0 GADOBENATE DIMEGLUMINE 529 MG/ML SOLUTION: Performed by: INTERNAL MEDICINE

## 2018-01-01 PROCEDURE — 83605 ASSAY OF LACTIC ACID: CPT | Performed by: EMERGENCY MEDICINE

## 2018-01-01 PROCEDURE — 36600 WITHDRAWAL OF ARTERIAL BLOOD: CPT

## 2018-01-01 PROCEDURE — 80053 COMPREHEN METABOLIC PANEL: CPT | Performed by: INTERNAL MEDICINE

## 2018-01-01 PROCEDURE — 80053 COMPREHEN METABOLIC PANEL: CPT | Performed by: EMERGENCY MEDICINE

## 2018-01-01 PROCEDURE — 85027 COMPLETE CBC AUTOMATED: CPT | Performed by: INTERNAL MEDICINE

## 2018-01-01 PROCEDURE — 97162 PT EVAL MOD COMPLEX 30 MIN: CPT

## 2018-01-01 PROCEDURE — 25810000003 DEXTROSE-NACL PER 500 ML: Performed by: INTERNAL MEDICINE

## 2018-01-01 PROCEDURE — 92611 MOTION FLUOROSCOPY/SWALLOW: CPT

## 2018-01-01 PROCEDURE — 99307 SBSQ NF CARE SF MDM 10: CPT | Performed by: INTERNAL MEDICINE

## 2018-01-01 PROCEDURE — 87186 SC STD MICRODIL/AGAR DIL: CPT | Performed by: NURSE PRACTITIONER

## 2018-01-01 PROCEDURE — 85025 COMPLETE CBC W/AUTO DIFF WBC: CPT | Performed by: EMERGENCY MEDICINE

## 2018-01-01 PROCEDURE — 85610 PROTHROMBIN TIME: CPT | Performed by: EMERGENCY MEDICINE

## 2018-01-01 RX ORDER — LEVOTHYROXINE SODIUM 0.05 MG/1
TABLET ORAL
Qty: 90 TABLET | Refills: 1 | OUTPATIENT
Start: 2018-01-01

## 2018-01-01 RX ORDER — GLYCOPYRROLATE 0.2 MG/ML
0.2 INJECTION INTRAMUSCULAR; INTRAVENOUS
Status: DISCONTINUED | OUTPATIENT
Start: 2018-01-01 | End: 2018-01-01 | Stop reason: HOSPADM

## 2018-01-01 RX ORDER — MORPHINE SULFATE 10 MG/.5ML
20 SOLUTION ORAL
Status: DISCONTINUED | OUTPATIENT
Start: 2018-01-01 | End: 2018-01-01 | Stop reason: HOSPADM

## 2018-01-01 RX ORDER — ACETAMINOPHEN 325 MG/1
650 TABLET ORAL EVERY 6 HOURS PRN
Start: 2018-01-01

## 2018-01-01 RX ORDER — ONDANSETRON 2 MG/ML
4 INJECTION INTRAMUSCULAR; INTRAVENOUS EVERY 6 HOURS PRN
Status: DISCONTINUED | OUTPATIENT
Start: 2018-01-01 | End: 2018-01-01 | Stop reason: HOSPADM

## 2018-01-01 RX ORDER — LORAZEPAM 2 MG/ML
1 CONCENTRATE ORAL
Status: DISCONTINUED | OUTPATIENT
Start: 2018-01-01 | End: 2018-01-01 | Stop reason: HOSPADM

## 2018-01-01 RX ORDER — PROMETHAZINE HYDROCHLORIDE 12.5 MG/1
12.5 SUPPOSITORY RECTAL EVERY 4 HOURS PRN
Status: DISCONTINUED | OUTPATIENT
Start: 2018-01-01 | End: 2018-01-01 | Stop reason: HOSPADM

## 2018-01-01 RX ORDER — LEVOTHYROXINE SODIUM 0.05 MG/1
50 TABLET ORAL
Status: DISCONTINUED | OUTPATIENT
Start: 2018-01-01 | End: 2018-01-01 | Stop reason: HOSPADM

## 2018-01-01 RX ORDER — ASPIRIN 81 MG/1
81 TABLET, CHEWABLE ORAL DAILY
Status: DISCONTINUED | OUTPATIENT
Start: 2018-01-01 | End: 2018-01-01

## 2018-01-01 RX ORDER — AMLODIPINE BESYLATE 2.5 MG/1
2.5 TABLET ORAL DAILY
Status: DISCONTINUED | OUTPATIENT
Start: 2018-01-01 | End: 2018-01-01

## 2018-01-01 RX ORDER — MEMANTINE HYDROCHLORIDE 5 MG/1
TABLET ORAL
Status: COMPLETED
Start: 2018-01-01 | End: 2018-01-01

## 2018-01-01 RX ORDER — MORPHINE SULFATE 10 MG/.5ML
10 SOLUTION ORAL
Status: DISCONTINUED | OUTPATIENT
Start: 2018-01-01 | End: 2018-01-01 | Stop reason: HOSPADM

## 2018-01-01 RX ORDER — MORPHINE SULFATE 2 MG/ML
1 INJECTION, SOLUTION INTRAMUSCULAR; INTRAVENOUS
Status: DISCONTINUED | OUTPATIENT
Start: 2018-01-01 | End: 2018-01-01 | Stop reason: HOSPADM

## 2018-01-01 RX ORDER — SENNA AND DOCUSATE SODIUM 50; 8.6 MG/1; MG/1
2 TABLET, FILM COATED ORAL NIGHTLY
Start: 2018-01-01

## 2018-01-01 RX ORDER — LACTULOSE 10 G/15ML
10 SOLUTION ORAL 2 TIMES DAILY
Status: DISCONTINUED | OUTPATIENT
Start: 2018-01-01 | End: 2018-01-01 | Stop reason: HOSPADM

## 2018-01-01 RX ORDER — HALOPERIDOL 2 MG/ML
1 SOLUTION ORAL EVERY 4 HOURS PRN
Status: DISCONTINUED | OUTPATIENT
Start: 2018-01-01 | End: 2018-01-01 | Stop reason: HOSPADM

## 2018-01-01 RX ORDER — HYDRALAZINE HYDROCHLORIDE 25 MG/1
25 TABLET, FILM COATED ORAL CONTINUOUS PRN
Status: DISCONTINUED | OUTPATIENT
Start: 2018-01-01 | End: 2018-01-01

## 2018-01-01 RX ORDER — LORAZEPAM 1 MG/1
1 TABLET ORAL
Status: DISCONTINUED | OUTPATIENT
Start: 2018-01-01 | End: 2018-01-01 | Stop reason: HOSPADM

## 2018-01-01 RX ORDER — ACETAMINOPHEN 325 MG/1
650 TABLET ORAL EVERY 4 HOURS PRN
Status: DISCONTINUED | OUTPATIENT
Start: 2018-01-01 | End: 2018-01-01 | Stop reason: HOSPADM

## 2018-01-01 RX ORDER — TAMSULOSIN HYDROCHLORIDE 0.4 MG/1
CAPSULE ORAL
Qty: 90 CAPSULE | Refills: 1 | OUTPATIENT
Start: 2018-01-01

## 2018-01-01 RX ORDER — MORPHINE SULFATE 20 MG/ML
20 SOLUTION ORAL
Status: DISCONTINUED | OUTPATIENT
Start: 2018-01-01 | End: 2018-01-01 | Stop reason: CLARIF

## 2018-01-01 RX ORDER — GLYCOPYRROLATE 0.2 MG/ML
0.4 INJECTION INTRAMUSCULAR; INTRAVENOUS
Status: DISCONTINUED | OUTPATIENT
Start: 2018-01-01 | End: 2018-01-01 | Stop reason: HOSPADM

## 2018-01-01 RX ORDER — LEVOTHYROXINE SODIUM 0.05 MG/1
50 TABLET ORAL
COMMUNITY
Start: 2017-01-01

## 2018-01-01 RX ORDER — SODIUM CHLORIDE AND POTASSIUM CHLORIDE 150; 900 MG/100ML; MG/100ML
100 INJECTION, SOLUTION INTRAVENOUS CONTINUOUS
Status: DISPENSED | OUTPATIENT
Start: 2018-01-01 | End: 2018-01-01

## 2018-01-01 RX ORDER — LORAZEPAM 2 MG/ML
0.5 CONCENTRATE ORAL
Status: DISCONTINUED | OUTPATIENT
Start: 2018-01-01 | End: 2018-01-01 | Stop reason: HOSPADM

## 2018-01-01 RX ORDER — MEMANTINE HYDROCHLORIDE 5 MG/1
10 TABLET ORAL EVERY 12 HOURS SCHEDULED
Status: DISCONTINUED | OUTPATIENT
Start: 2018-01-01 | End: 2018-01-01

## 2018-01-01 RX ORDER — MELATONIN
2000 2 TIMES DAILY
Status: DISCONTINUED | OUTPATIENT
Start: 2018-01-01 | End: 2018-01-01

## 2018-01-01 RX ORDER — SODIUM CHLORIDE 9 MG/ML
75 INJECTION, SOLUTION INTRAVENOUS CONTINUOUS
Status: DISCONTINUED | OUTPATIENT
Start: 2018-01-01 | End: 2018-01-01

## 2018-01-01 RX ORDER — PETROLATUM 42 G/100G
OINTMENT TOPICAL EVERY 12 HOURS SCHEDULED
Status: DISCONTINUED | OUTPATIENT
Start: 2018-01-01 | End: 2018-01-01 | Stop reason: HOSPADM

## 2018-01-01 RX ORDER — MORPHINE SULFATE 2 MG/ML
6 INJECTION, SOLUTION INTRAMUSCULAR; INTRAVENOUS
Status: DISCONTINUED | OUTPATIENT
Start: 2018-01-01 | End: 2018-01-01 | Stop reason: HOSPADM

## 2018-01-01 RX ORDER — LORAZEPAM 1 MG/1
2 TABLET ORAL
Status: DISCONTINUED | OUTPATIENT
Start: 2018-01-01 | End: 2018-01-01 | Stop reason: HOSPADM

## 2018-01-01 RX ORDER — SODIUM CHLORIDE 0.9 % (FLUSH) 0.9 %
10 SYRINGE (ML) INJECTION AS NEEDED
Status: DISCONTINUED | OUTPATIENT
Start: 2018-01-01 | End: 2018-01-01 | Stop reason: HOSPADM

## 2018-01-01 RX ORDER — HALOPERIDOL 5 MG/ML
1 INJECTION INTRAMUSCULAR EVERY 4 HOURS PRN
Status: DISCONTINUED | OUTPATIENT
Start: 2018-01-01 | End: 2018-01-01 | Stop reason: HOSPADM

## 2018-01-01 RX ORDER — RISPERIDONE 0.5 MG/1
0.5 TABLET ORAL NIGHTLY
Status: DISCONTINUED | OUTPATIENT
Start: 2018-01-01 | End: 2018-01-01

## 2018-01-01 RX ORDER — SENNA AND DOCUSATE SODIUM 50; 8.6 MG/1; MG/1
2 TABLET, FILM COATED ORAL 2 TIMES DAILY
Status: DISCONTINUED | OUTPATIENT
Start: 2018-01-01 | End: 2018-01-01

## 2018-01-01 RX ORDER — PROMETHAZINE HYDROCHLORIDE 25 MG/ML
6.25 INJECTION, SOLUTION INTRAMUSCULAR; INTRAVENOUS EVERY 4 HOURS PRN
Status: DISCONTINUED | OUTPATIENT
Start: 2018-01-01 | End: 2018-01-01 | Stop reason: HOSPADM

## 2018-01-01 RX ORDER — METHYLPHENIDATE HYDROCHLORIDE 5 MG/1
10 TABLET ORAL
Status: DISCONTINUED | OUTPATIENT
Start: 2018-01-01 | End: 2018-01-01

## 2018-01-01 RX ORDER — POLYETHYLENE GLYCOL 3350 17 G/17G
17 POWDER, FOR SOLUTION ORAL DAILY PRN
Status: DISCONTINUED | OUTPATIENT
Start: 2018-01-01 | End: 2018-01-01

## 2018-01-01 RX ORDER — TAMSULOSIN HYDROCHLORIDE 0.4 MG/1
0.4 CAPSULE ORAL
COMMUNITY
Start: 2017-01-01

## 2018-01-01 RX ORDER — ACETAMINOPHEN 650 MG/1
650 SUPPOSITORY RECTAL EVERY 4 HOURS PRN
Status: DISCONTINUED | OUTPATIENT
Start: 2018-01-01 | End: 2018-01-01 | Stop reason: HOSPADM

## 2018-01-01 RX ORDER — IPRATROPIUM BROMIDE AND ALBUTEROL SULFATE 2.5; .5 MG/3ML; MG/3ML
3 SOLUTION RESPIRATORY (INHALATION)
Status: DISCONTINUED | OUTPATIENT
Start: 2018-01-01 | End: 2018-01-01

## 2018-01-01 RX ORDER — MEMANTINE HYDROCHLORIDE 10 MG/1
10 TABLET ORAL EVERY 12 HOURS SCHEDULED
Status: DISCONTINUED | OUTPATIENT
Start: 2018-01-01 | End: 2018-01-01 | Stop reason: HOSPADM

## 2018-01-01 RX ORDER — SODIUM CHLORIDE 0.9 % (FLUSH) 0.9 %
1-10 SYRINGE (ML) INJECTION AS NEEDED
Status: DISCONTINUED | OUTPATIENT
Start: 2018-01-01 | End: 2018-01-01 | Stop reason: HOSPADM

## 2018-01-01 RX ORDER — POLYETHYLENE GLYCOL 3350 17 G/17G
17 POWDER, FOR SOLUTION ORAL DAILY
Status: DISCONTINUED | OUTPATIENT
Start: 2018-01-01 | End: 2018-01-01

## 2018-01-01 RX ORDER — LORAZEPAM 2 MG/ML
2 CONCENTRATE ORAL
Status: DISCONTINUED | OUTPATIENT
Start: 2018-01-01 | End: 2018-01-01 | Stop reason: HOSPADM

## 2018-01-01 RX ORDER — AMPICILLIN 500 MG/1
CAPSULE ORAL
Status: COMPLETED
Start: 2018-01-01 | End: 2018-01-01

## 2018-01-01 RX ORDER — NITROGLYCERIN 0.4 MG/1
0.4 TABLET SUBLINGUAL
Status: DISCONTINUED | OUTPATIENT
Start: 2018-01-01 | End: 2018-01-01 | Stop reason: HOSPADM

## 2018-01-01 RX ORDER — MIRTAZAPINE 30 MG/1
30 TABLET, FILM COATED ORAL NIGHTLY
Status: DISCONTINUED | OUTPATIENT
Start: 2018-01-01 | End: 2018-01-01

## 2018-01-01 RX ORDER — DIPHENOXYLATE HYDROCHLORIDE AND ATROPINE SULFATE 2.5; .025 MG/1; MG/1
1 TABLET ORAL
Status: DISCONTINUED | OUTPATIENT
Start: 2018-01-01 | End: 2018-01-01 | Stop reason: HOSPADM

## 2018-01-01 RX ORDER — IPRATROPIUM BROMIDE AND ALBUTEROL SULFATE 2.5; .5 MG/3ML; MG/3ML
3 SOLUTION RESPIRATORY (INHALATION) EVERY 6 HOURS PRN
Status: DISCONTINUED | OUTPATIENT
Start: 2018-01-01 | End: 2018-01-01 | Stop reason: HOSPADM

## 2018-01-01 RX ORDER — ISOSORBIDE MONONITRATE 30 MG/1
30 TABLET, EXTENDED RELEASE ORAL DAILY
Status: DISCONTINUED | OUTPATIENT
Start: 2018-01-01 | End: 2018-01-01

## 2018-01-01 RX ORDER — SCOLOPAMINE TRANSDERMAL SYSTEM 1 MG/1
1 PATCH, EXTENDED RELEASE TRANSDERMAL
Status: DISCONTINUED | OUTPATIENT
Start: 2018-01-01 | End: 2018-01-01 | Stop reason: HOSPADM

## 2018-01-01 RX ORDER — LEVOTHYROXINE SODIUM 0.05 MG/1
50 TABLET ORAL
Status: DISCONTINUED | OUTPATIENT
Start: 2018-01-01 | End: 2018-01-01

## 2018-01-01 RX ORDER — MORPHINE SULFATE 2 MG/ML
6 INJECTION, SOLUTION INTRAMUSCULAR; INTRAVENOUS
Status: DISCONTINUED | OUTPATIENT
Start: 2018-01-01 | End: 2018-01-01 | Stop reason: CLARIF

## 2018-01-01 RX ORDER — MELATONIN
2000 DAILY
Status: DISCONTINUED | OUTPATIENT
Start: 2018-01-01 | End: 2018-01-01 | Stop reason: HOSPADM

## 2018-01-01 RX ORDER — DONEPEZIL HYDROCHLORIDE 10 MG/1
20 TABLET, FILM COATED ORAL NIGHTLY
Status: DISCONTINUED | OUTPATIENT
Start: 2018-01-01 | End: 2018-01-01 | Stop reason: HOSPADM

## 2018-01-01 RX ORDER — DEXTROSE MONOHYDRATE 50 MG/ML
100 INJECTION, SOLUTION INTRAVENOUS CONTINUOUS
Status: DISCONTINUED | OUTPATIENT
Start: 2018-01-01 | End: 2018-01-01

## 2018-01-01 RX ORDER — LACTULOSE 20 G/30ML
10 SOLUTION ORAL 2 TIMES DAILY
Status: DISCONTINUED | OUTPATIENT
Start: 2018-01-01 | End: 2018-01-01

## 2018-01-01 RX ORDER — PROMETHAZINE HYDROCHLORIDE 25 MG/ML
12.5 INJECTION, SOLUTION INTRAMUSCULAR; INTRAVENOUS EVERY 4 HOURS PRN
Status: DISCONTINUED | OUTPATIENT
Start: 2018-01-01 | End: 2018-01-01 | Stop reason: HOSPADM

## 2018-01-01 RX ORDER — SENNA AND DOCUSATE SODIUM 50; 8.6 MG/1; MG/1
2 TABLET, FILM COATED ORAL NIGHTLY
Status: DISCONTINUED | OUTPATIENT
Start: 2018-01-01 | End: 2018-01-01 | Stop reason: HOSPADM

## 2018-01-01 RX ORDER — MODAFINIL 100 MG/1
100 TABLET ORAL DAILY
Status: DISPENSED | OUTPATIENT
Start: 2018-01-01 | End: 2018-01-01

## 2018-01-01 RX ORDER — MORPHINE SULFATE 2 MG/ML
4 INJECTION, SOLUTION INTRAMUSCULAR; INTRAVENOUS
Status: DISCONTINUED | OUTPATIENT
Start: 2018-01-01 | End: 2018-01-01 | Stop reason: HOSPADM

## 2018-01-01 RX ORDER — DONEPEZIL HYDROCHLORIDE 5 MG/1
20 TABLET, FILM COATED ORAL NIGHTLY
Status: DISCONTINUED | OUTPATIENT
Start: 2018-01-01 | End: 2018-01-01

## 2018-01-01 RX ORDER — MODAFINIL 100 MG/1
100 TABLET ORAL DAILY
Status: DISCONTINUED | OUTPATIENT
Start: 2018-01-01 | End: 2018-01-01

## 2018-01-01 RX ORDER — DIPHENHYDRAMINE HYDROCHLORIDE 50 MG/ML
25 INJECTION INTRAMUSCULAR; INTRAVENOUS EVERY 6 HOURS PRN
Status: DISCONTINUED | OUTPATIENT
Start: 2018-01-01 | End: 2018-01-01 | Stop reason: HOSPADM

## 2018-01-01 RX ORDER — AMOXICILLIN AND CLAVULANATE POTASSIUM 400; 57 MG/5ML; MG/5ML
400 POWDER, FOR SUSPENSION ORAL EVERY 12 HOURS SCHEDULED
Status: DISPENSED | OUTPATIENT
Start: 2018-01-01 | End: 2018-01-01

## 2018-01-01 RX ORDER — HALOPERIDOL 1 MG/1
1 TABLET ORAL EVERY 4 HOURS PRN
Status: DISCONTINUED | OUTPATIENT
Start: 2018-01-01 | End: 2018-01-01 | Stop reason: HOSPADM

## 2018-01-01 RX ORDER — AMOXICILLIN AND CLAVULANATE POTASSIUM 875; 125 MG/1; MG/1
1 TABLET, FILM COATED ORAL EVERY 12 HOURS SCHEDULED
Status: DISCONTINUED | OUTPATIENT
Start: 2018-01-01 | End: 2018-01-01 | Stop reason: HOSPADM

## 2018-01-01 RX ORDER — AMPICILLIN 500 MG/1
500 CAPSULE ORAL EVERY 8 HOURS SCHEDULED
Status: COMPLETED | OUTPATIENT
Start: 2018-01-01 | End: 2018-01-01

## 2018-01-01 RX ORDER — POLYVINYL ALCOHOL 14 MG/ML
1 SOLUTION/ DROPS OPHTHALMIC
Status: DISCONTINUED | OUTPATIENT
Start: 2018-01-01 | End: 2018-01-01 | Stop reason: HOSPADM

## 2018-01-01 RX ORDER — ISOSORBIDE MONONITRATE 30 MG/1
30 TABLET, EXTENDED RELEASE ORAL DAILY
Status: DISCONTINUED | OUTPATIENT
Start: 2018-01-01 | End: 2018-01-01 | Stop reason: HOSPADM

## 2018-01-01 RX ORDER — POLYETHYLENE GLYCOL 3350 17 G/17G
17 POWDER, FOR SOLUTION ORAL 2 TIMES DAILY
Status: DISCONTINUED | OUTPATIENT
Start: 2018-01-01 | End: 2018-01-01

## 2018-01-01 RX ORDER — PROMETHAZINE HYDROCHLORIDE 12.5 MG/1
6.25 SUPPOSITORY RECTAL EVERY 4 HOURS PRN
Status: DISCONTINUED | OUTPATIENT
Start: 2018-01-01 | End: 2018-01-01 | Stop reason: HOSPADM

## 2018-01-01 RX ORDER — DEXTROSE AND SODIUM CHLORIDE 5; .9 G/100ML; G/100ML
125 INJECTION, SOLUTION INTRAVENOUS CONTINUOUS
Status: DISCONTINUED | OUTPATIENT
Start: 2018-01-01 | End: 2018-01-01

## 2018-01-01 RX ORDER — ASPIRIN 81 MG/1
81 TABLET, CHEWABLE ORAL DAILY
Status: DISCONTINUED | OUTPATIENT
Start: 2018-01-01 | End: 2018-01-01 | Stop reason: HOSPADM

## 2018-01-01 RX ORDER — PROMETHAZINE HYDROCHLORIDE 6.25 MG/5ML
6.25 SYRUP ORAL EVERY 4 HOURS PRN
Status: DISCONTINUED | OUTPATIENT
Start: 2018-01-01 | End: 2018-01-01 | Stop reason: HOSPADM

## 2018-01-01 RX ORDER — ATROPINE SULFATE 10 MG/ML
2 SOLUTION/ DROPS OPHTHALMIC 2 TIMES DAILY PRN
Status: DISCONTINUED | OUTPATIENT
Start: 2018-01-01 | End: 2018-01-01 | Stop reason: HOSPADM

## 2018-01-01 RX ORDER — LORAZEPAM 0.5 MG/1
0.5 TABLET ORAL
Status: DISCONTINUED | OUTPATIENT
Start: 2018-01-01 | End: 2018-01-01 | Stop reason: HOSPADM

## 2018-01-01 RX ORDER — PROMETHAZINE HYDROCHLORIDE 25 MG/1
6.25 TABLET ORAL EVERY 4 HOURS PRN
Status: DISCONTINUED | OUTPATIENT
Start: 2018-01-01 | End: 2018-01-01 | Stop reason: HOSPADM

## 2018-01-01 RX ORDER — PROMETHAZINE HYDROCHLORIDE 25 MG/1
12.5 TABLET ORAL EVERY 4 HOURS PRN
Status: DISCONTINUED | OUTPATIENT
Start: 2018-01-01 | End: 2018-01-01 | Stop reason: HOSPADM

## 2018-01-01 RX ORDER — IPRATROPIUM BROMIDE AND ALBUTEROL SULFATE 2.5; .5 MG/3ML; MG/3ML
3 SOLUTION RESPIRATORY (INHALATION) EVERY 8 HOURS PRN
Status: DISCONTINUED | OUTPATIENT
Start: 2018-01-01 | End: 2018-01-01

## 2018-01-01 RX ORDER — TAMSULOSIN HYDROCHLORIDE 0.4 MG/1
0.4 CAPSULE ORAL DAILY
Status: DISCONTINUED | OUTPATIENT
Start: 2018-01-01 | End: 2018-01-01

## 2018-01-01 RX ORDER — DIPHENHYDRAMINE HCL 25 MG
25 CAPSULE ORAL EVERY 6 HOURS PRN
Status: DISCONTINUED | OUTPATIENT
Start: 2018-01-01 | End: 2018-01-01 | Stop reason: HOSPADM

## 2018-01-01 RX ORDER — ACETAMINOPHEN 325 MG/1
650 TABLET ORAL EVERY 6 HOURS PRN
Status: DISCONTINUED | OUTPATIENT
Start: 2018-01-01 | End: 2018-01-01 | Stop reason: HOSPADM

## 2018-01-01 RX ORDER — PROMETHAZINE HYDROCHLORIDE 6.25 MG/5ML
12.5 SYRUP ORAL EVERY 4 HOURS PRN
Status: DISCONTINUED | OUTPATIENT
Start: 2018-01-01 | End: 2018-01-01 | Stop reason: HOSPADM

## 2018-01-01 RX ORDER — TAMSULOSIN HYDROCHLORIDE 0.4 MG/1
0.4 CAPSULE ORAL DAILY
Status: DISCONTINUED | OUTPATIENT
Start: 2018-01-01 | End: 2018-01-01 | Stop reason: HOSPADM

## 2018-01-01 RX ORDER — RISPERIDONE 1 MG/1
1 TABLET ORAL NIGHTLY
Status: DISCONTINUED | OUTPATIENT
Start: 2018-01-01 | End: 2018-01-01

## 2018-01-01 RX ORDER — AMOXICILLIN AND CLAVULANATE POTASSIUM 875; 125 MG/1; MG/1
1 TABLET, FILM COATED ORAL EVERY 12 HOURS SCHEDULED
Qty: 12 TABLET | Refills: 0 | Status: SHIPPED | OUTPATIENT
Start: 2018-01-01 | End: 2018-01-01 | Stop reason: HOSPADM

## 2018-01-01 RX ORDER — ACETAMINOPHEN 160 MG/5ML
650 SOLUTION ORAL EVERY 4 HOURS PRN
Status: DISCONTINUED | OUTPATIENT
Start: 2018-01-01 | End: 2018-01-01 | Stop reason: HOSPADM

## 2018-01-01 RX ORDER — DIVALPROEX SODIUM 250 MG/1
250 TABLET, EXTENDED RELEASE ORAL NIGHTLY
Status: DISCONTINUED | OUTPATIENT
Start: 2018-01-01 | End: 2018-01-01

## 2018-01-01 RX ORDER — SENNA AND DOCUSATE SODIUM 50; 8.6 MG/1; MG/1
2 TABLET, FILM COATED ORAL NIGHTLY
Status: DISCONTINUED | OUTPATIENT
Start: 2018-01-01 | End: 2018-01-01

## 2018-01-01 RX ORDER — LACTULOSE 10 G/15ML
10 SOLUTION ORAL 2 TIMES DAILY
Start: 2018-01-01

## 2018-01-01 RX ADMIN — VITAMIN D, TAB 1000IU (100/BT) 2000 UNITS: 25 TAB at 21:18

## 2018-01-01 RX ADMIN — LEVOTHYROXINE SODIUM 50 MCG: 50 TABLET ORAL at 05:49

## 2018-01-01 RX ADMIN — ASPIRIN 81 MG CHEWABLE TABLET 81 MG: 81 TABLET CHEWABLE at 08:59

## 2018-01-01 RX ADMIN — AMPICILLIN 500 MG: 500 CAPSULE ORAL at 14:14

## 2018-01-01 RX ADMIN — MICONAZOLE NITRATE: 20 POWDER TOPICAL at 09:32

## 2018-01-01 RX ADMIN — MICONAZOLE NITRATE: 20 POWDER TOPICAL at 21:00

## 2018-01-01 RX ADMIN — ACETAMINOPHEN 650 MG: 325 TABLET, FILM COATED ORAL at 20:30

## 2018-01-01 RX ADMIN — VITAMIN D, TAB 1000IU (100/BT) 2000 UNITS: 25 TAB at 10:04

## 2018-01-01 RX ADMIN — VITAMIN D, TAB 1000IU (100/BT) 2000 UNITS: 25 TAB at 20:31

## 2018-01-01 RX ADMIN — MICONAZOLE NITRATE: 20 POWDER TOPICAL at 12:46

## 2018-01-01 RX ADMIN — LACTULOSE 10 G: 20 SOLUTION ORAL at 21:22

## 2018-01-01 RX ADMIN — DIVALPROEX SODIUM 250 MG: 250 TABLET, FILM COATED, EXTENDED RELEASE ORAL at 21:03

## 2018-01-01 RX ADMIN — ASPIRIN 81 MG CHEWABLE TABLET 81 MG: 81 TABLET CHEWABLE at 08:39

## 2018-01-01 RX ADMIN — SENNOSIDES AND DOCUSATE SODIUM 2 TABLET: 8.6; 5 TABLET ORAL at 10:06

## 2018-01-01 RX ADMIN — ISOSORBIDE MONONITRATE 30 MG: 60 TABLET, EXTENDED RELEASE ORAL at 08:46

## 2018-01-01 RX ADMIN — VITAMIN D, TAB 1000IU (100/BT) 2000 UNITS: 25 TAB at 10:05

## 2018-01-01 RX ADMIN — ISOSORBIDE MONONITRATE 30 MG: 60 TABLET, EXTENDED RELEASE ORAL at 08:51

## 2018-01-01 RX ADMIN — MODAFINIL 100 MG: 100 TABLET ORAL at 09:48

## 2018-01-01 RX ADMIN — VITAMIN D, TAB 1000IU (100/BT) 2000 UNITS: 25 TAB at 20:40

## 2018-01-01 RX ADMIN — LEVOTHYROXINE SODIUM 50 MCG: 50 TABLET ORAL at 06:04

## 2018-01-01 RX ADMIN — TAMSULOSIN HYDROCHLORIDE 0.4 MG: 0.4 CAPSULE ORAL at 08:49

## 2018-01-01 RX ADMIN — VITAMIN D, TAB 1000IU (100/BT) 2000 UNITS: 25 TAB at 08:37

## 2018-01-01 RX ADMIN — PETROLATUM: 42 OINTMENT TOPICAL at 20:25

## 2018-01-01 RX ADMIN — DIVALPROEX SODIUM 250 MG: 250 TABLET, FILM COATED, EXTENDED RELEASE ORAL at 20:45

## 2018-01-01 RX ADMIN — DONEPEZIL HYDROCHLORIDE 20 MG: 5 TABLET ORAL at 20:28

## 2018-01-01 RX ADMIN — IPRATROPIUM BROMIDE AND ALBUTEROL SULFATE 3 ML: .5; 3 SOLUTION RESPIRATORY (INHALATION) at 23:00

## 2018-01-01 RX ADMIN — ASPIRIN 81 MG CHEWABLE TABLET 81 MG: 81 TABLET CHEWABLE at 08:47

## 2018-01-01 RX ADMIN — VITAMIN D, TAB 1000IU (100/BT) 2000 UNITS: 25 TAB at 09:33

## 2018-01-01 RX ADMIN — IPRATROPIUM BROMIDE AND ALBUTEROL SULFATE 3 ML: .5; 3 SOLUTION RESPIRATORY (INHALATION) at 07:54

## 2018-01-01 RX ADMIN — DONEPEZIL HYDROCHLORIDE 20 MG: 5 TABLET ORAL at 20:39

## 2018-01-01 RX ADMIN — ISOSORBIDE MONONITRATE 30 MG: 60 TABLET, EXTENDED RELEASE ORAL at 08:47

## 2018-01-01 RX ADMIN — ASPIRIN 81 MG CHEWABLE TABLET 81 MG: 81 TABLET CHEWABLE at 08:32

## 2018-01-01 RX ADMIN — MICONAZOLE NITRATE: 20 POWDER TOPICAL at 08:58

## 2018-01-01 RX ADMIN — SENNOSIDES AND DOCUSATE SODIUM 2 TABLET: 8.6; 5 TABLET ORAL at 09:59

## 2018-01-01 RX ADMIN — PETROLATUM: 42 OINTMENT TOPICAL at 20:53

## 2018-01-01 RX ADMIN — POLYETHYLENE GLYCOL 3350 17 G: 17 POWDER, FOR SOLUTION ORAL at 08:46

## 2018-01-01 RX ADMIN — SENNOSIDES AND DOCUSATE SODIUM 2 TABLET: 8.6; 5 TABLET ORAL at 21:11

## 2018-01-01 RX ADMIN — PETROLATUM 1 APPLICATION: 42 OINTMENT TOPICAL at 08:50

## 2018-01-01 RX ADMIN — TAZOBACTAM SODIUM AND PIPERACILLIN SODIUM 3.38 G: 375; 3 INJECTION, SOLUTION INTRAVENOUS at 12:37

## 2018-01-01 RX ADMIN — LACTULOSE 10 G: 20 SOLUTION ORAL at 21:14

## 2018-01-01 RX ADMIN — TAMSULOSIN HYDROCHLORIDE 0.4 MG: 0.4 CAPSULE ORAL at 08:31

## 2018-01-01 RX ADMIN — LACTULOSE 10 G: 20 SOLUTION ORAL at 20:52

## 2018-01-01 RX ADMIN — LACTULOSE 10 G: 20 SOLUTION ORAL at 10:44

## 2018-01-01 RX ADMIN — LACTULOSE 10 G: 20 SOLUTION ORAL at 10:46

## 2018-01-01 RX ADMIN — TAMSULOSIN HYDROCHLORIDE 0.4 MG: 0.4 CAPSULE ORAL at 08:37

## 2018-01-01 RX ADMIN — ACETAMINOPHEN 650 MG: 325 TABLET, FILM COATED ORAL at 10:15

## 2018-01-01 RX ADMIN — TAZOBACTAM SODIUM AND PIPERACILLIN SODIUM 3.38 G: 375; 3 INJECTION, SOLUTION INTRAVENOUS at 12:46

## 2018-01-01 RX ADMIN — VITAMIN D, TAB 1000IU (100/BT) 2000 UNITS: 25 TAB at 20:45

## 2018-01-01 RX ADMIN — ISOSORBIDE MONONITRATE 30 MG: 60 TABLET, EXTENDED RELEASE ORAL at 10:08

## 2018-01-01 RX ADMIN — TAZOBACTAM SODIUM AND PIPERACILLIN SODIUM 3.38 G: 375; 3 INJECTION, SOLUTION INTRAVENOUS at 21:44

## 2018-01-01 RX ADMIN — DONEPEZIL HYDROCHLORIDE 20 MG: 5 TABLET ORAL at 20:15

## 2018-01-01 RX ADMIN — MICONAZOLE NITRATE: 20 POWDER TOPICAL at 22:00

## 2018-01-01 RX ADMIN — VITAMIN D, TAB 1000IU (100/BT) 2000 UNITS: 25 TAB at 23:31

## 2018-01-01 RX ADMIN — MODAFINIL 100 MG: 100 TABLET ORAL at 09:51

## 2018-01-01 RX ADMIN — MICONAZOLE NITRATE: 20 POWDER TOPICAL at 20:33

## 2018-01-01 RX ADMIN — LEVOTHYROXINE SODIUM 50 MCG: 50 TABLET ORAL at 06:07

## 2018-01-01 RX ADMIN — MICONAZOLE NITRATE: 20 POWDER TOPICAL at 09:59

## 2018-01-01 RX ADMIN — MEMANTINE HYDROCHLORIDE 10 MG: 10 TABLET ORAL at 10:20

## 2018-01-01 RX ADMIN — MEMANTINE HYDROCHLORIDE 10 MG: 10 TABLET ORAL at 20:39

## 2018-01-01 RX ADMIN — ASPIRIN 81 MG CHEWABLE TABLET 81 MG: 81 TABLET CHEWABLE at 08:23

## 2018-01-01 RX ADMIN — SENNOSIDES AND DOCUSATE SODIUM 2 TABLET: 8.6; 5 TABLET ORAL at 20:15

## 2018-01-01 RX ADMIN — TAMSULOSIN HYDROCHLORIDE 0.4 MG: 0.4 CAPSULE ORAL at 08:17

## 2018-01-01 RX ADMIN — ACETAMINOPHEN 650 MG: 325 TABLET, FILM COATED ORAL at 22:40

## 2018-01-01 RX ADMIN — DONEPEZIL HYDROCHLORIDE 20 MG: 5 TABLET ORAL at 21:37

## 2018-01-01 RX ADMIN — LACTULOSE 10 G: 20 SOLUTION ORAL at 20:23

## 2018-01-01 RX ADMIN — MEMANTINE HYDROCHLORIDE 10 MG: 10 TABLET ORAL at 10:15

## 2018-01-01 RX ADMIN — MICONAZOLE NITRATE: 20 POWDER TOPICAL at 20:16

## 2018-01-01 RX ADMIN — VITAMIN D, TAB 1000IU (100/BT) 2000 UNITS: 25 TAB at 20:36

## 2018-01-01 RX ADMIN — VITAMIN D, TAB 1000IU (100/BT) 2000 UNITS: 25 TAB at 08:47

## 2018-01-01 RX ADMIN — ISOSORBIDE MONONITRATE 30 MG: 30 TABLET ORAL at 11:40

## 2018-01-01 RX ADMIN — POLYETHYLENE GLYCOL 3350 17 G: 17 POWDER, FOR SOLUTION ORAL at 10:20

## 2018-01-01 RX ADMIN — IPRATROPIUM BROMIDE AND ALBUTEROL SULFATE 3 ML: .5; 3 SOLUTION RESPIRATORY (INHALATION) at 08:00

## 2018-01-01 RX ADMIN — MODAFINIL 100 MG: 100 TABLET ORAL at 09:59

## 2018-01-01 RX ADMIN — SENNOSIDES AND DOCUSATE SODIUM 2 TABLET: 8.6; 5 TABLET ORAL at 20:25

## 2018-01-01 RX ADMIN — IPRATROPIUM BROMIDE AND ALBUTEROL SULFATE 3 ML: .5; 3 SOLUTION RESPIRATORY (INHALATION) at 17:51

## 2018-01-01 RX ADMIN — LACTULOSE 10 G: 20 SOLUTION ORAL at 08:23

## 2018-01-01 RX ADMIN — SENNOSIDES AND DOCUSATE SODIUM 2 TABLET: 8.6; 5 TABLET ORAL at 20:10

## 2018-01-01 RX ADMIN — MICONAZOLE NITRATE: 20 POWDER TOPICAL at 20:40

## 2018-01-01 RX ADMIN — DONEPEZIL HYDROCHLORIDE 20 MG: 5 TABLET ORAL at 22:14

## 2018-01-01 RX ADMIN — LEVOTHYROXINE SODIUM 50 MCG: 50 TABLET ORAL at 05:09

## 2018-01-01 RX ADMIN — PETROLATUM: 42 OINTMENT TOPICAL at 09:20

## 2018-01-01 RX ADMIN — LEVOTHYROXINE SODIUM 50 MCG: 50 TABLET ORAL at 05:01

## 2018-01-01 RX ADMIN — IPRATROPIUM BROMIDE AND ALBUTEROL SULFATE 3 ML: .5; 3 SOLUTION RESPIRATORY (INHALATION) at 15:48

## 2018-01-01 RX ADMIN — SENNOSIDES AND DOCUSATE SODIUM 2 TABLET: 8.6; 5 TABLET ORAL at 20:38

## 2018-01-01 RX ADMIN — MICONAZOLE NITRATE: 20 POWDER TOPICAL at 10:21

## 2018-01-01 RX ADMIN — VITAMIN D, TAB 1000IU (100/BT) 2000 UNITS: 25 TAB at 21:00

## 2018-01-01 RX ADMIN — AMPICILLIN 500 MG: 500 CAPSULE ORAL at 05:26

## 2018-01-01 RX ADMIN — ASPIRIN 81 MG CHEWABLE TABLET 81 MG: 81 TABLET CHEWABLE at 08:12

## 2018-01-01 RX ADMIN — MICONAZOLE NITRATE: 20 POWDER TOPICAL at 08:50

## 2018-01-01 RX ADMIN — VITAMIN D, TAB 1000IU (100/BT) 2000 UNITS: 25 TAB at 20:54

## 2018-01-01 RX ADMIN — LEVOTHYROXINE SODIUM 50 MCG: 50 TABLET ORAL at 06:17

## 2018-01-01 RX ADMIN — VITAMIN D, TAB 1000IU (100/BT) 2000 UNITS: 25 TAB at 09:57

## 2018-01-01 RX ADMIN — SENNOSIDES AND DOCUSATE SODIUM 2 TABLET: 8.6; 5 TABLET ORAL at 20:29

## 2018-01-01 RX ADMIN — MEMANTINE HYDROCHLORIDE 10 MG: 10 TABLET, FILM COATED ORAL at 08:00

## 2018-01-01 RX ADMIN — MODAFINIL 100 MG: 100 TABLET ORAL at 09:20

## 2018-01-01 RX ADMIN — POLYETHYLENE GLYCOL 3350 17 G: 17 POWDER, FOR SOLUTION ORAL at 10:14

## 2018-01-01 RX ADMIN — SENNOSIDES AND DOCUSATE SODIUM 2 TABLET: 8.6; 5 TABLET ORAL at 20:57

## 2018-01-01 RX ADMIN — MICONAZOLE NITRATE: 20 POWDER TOPICAL at 09:49

## 2018-01-01 RX ADMIN — POLYETHYLENE GLYCOL (3350) 17 G: 17 POWDER, FOR SOLUTION ORAL at 08:40

## 2018-01-01 RX ADMIN — MICONAZOLE NITRATE: 20 POWDER TOPICAL at 20:09

## 2018-01-01 RX ADMIN — ENOXAPARIN SODIUM 40 MG: 40 INJECTION SUBCUTANEOUS at 21:15

## 2018-01-01 RX ADMIN — IPRATROPIUM BROMIDE AND ALBUTEROL SULFATE 3 ML: .5; 3 SOLUTION RESPIRATORY (INHALATION) at 08:11

## 2018-01-01 RX ADMIN — VITAMIN D, TAB 1000IU (100/BT) 2000 UNITS: 25 TAB at 08:57

## 2018-01-01 RX ADMIN — MICONAZOLE NITRATE: 20 POWDER TOPICAL at 20:37

## 2018-01-01 RX ADMIN — ASPIRIN 81 MG CHEWABLE TABLET 81 MG: 81 TABLET CHEWABLE at 00:00

## 2018-01-01 RX ADMIN — SENNOSIDES AND DOCUSATE SODIUM 2 TABLET: 8.6; 5 TABLET ORAL at 10:14

## 2018-01-01 RX ADMIN — TAMSULOSIN HYDROCHLORIDE 0.4 MG: 0.4 CAPSULE ORAL at 08:19

## 2018-01-01 RX ADMIN — SENNOSIDES AND DOCUSATE SODIUM 2 TABLET: 8.6; 5 TABLET ORAL at 08:50

## 2018-01-01 RX ADMIN — VITAMIN D, TAB 1000IU (100/BT) 2000 UNITS: 25 TAB at 20:16

## 2018-01-01 RX ADMIN — IPRATROPIUM BROMIDE AND ALBUTEROL SULFATE 3 ML: .5; 3 SOLUTION RESPIRATORY (INHALATION) at 00:02

## 2018-01-01 RX ADMIN — ASPIRIN 81 MG CHEWABLE TABLET 81 MG: 81 TABLET CHEWABLE at 10:01

## 2018-01-01 RX ADMIN — ASPIRIN 81 MG CHEWABLE TABLET 81 MG: 81 TABLET CHEWABLE at 09:11

## 2018-01-01 RX ADMIN — VITAMIN D, TAB 1000IU (100/BT) 2000 UNITS: 25 TAB at 08:23

## 2018-01-01 RX ADMIN — LACTULOSE 10 G: 20 SOLUTION ORAL at 08:38

## 2018-01-01 RX ADMIN — LACTULOSE 10 G: 20 SOLUTION ORAL at 20:28

## 2018-01-01 RX ADMIN — SENNOSIDES AND DOCUSATE SODIUM 2 TABLET: 8.6; 5 TABLET ORAL at 20:56

## 2018-01-01 RX ADMIN — POLYETHYLENE GLYCOL 3350 17 G: 17 POWDER, FOR SOLUTION ORAL at 09:41

## 2018-01-01 RX ADMIN — TAZOBACTAM SODIUM AND PIPERACILLIN SODIUM 3.38 G: 375; 3 INJECTION, SOLUTION INTRAVENOUS at 05:45

## 2018-01-01 RX ADMIN — DIVALPROEX SODIUM 250 MG: 250 TABLET, FILM COATED, EXTENDED RELEASE ORAL at 21:31

## 2018-01-01 RX ADMIN — TAMSULOSIN HYDROCHLORIDE 0.4 MG: 0.4 CAPSULE ORAL at 09:41

## 2018-01-01 RX ADMIN — SENNOSIDES AND DOCUSATE SODIUM 2 TABLET: 8.6; 5 TABLET ORAL at 08:58

## 2018-01-01 RX ADMIN — MICONAZOLE NITRATE: 20 POWDER TOPICAL at 09:33

## 2018-01-01 RX ADMIN — IPRATROPIUM BROMIDE AND ALBUTEROL SULFATE 3 ML: .5; 3 SOLUTION RESPIRATORY (INHALATION) at 00:09

## 2018-01-01 RX ADMIN — SENNOSIDES AND DOCUSATE SODIUM 2 TABLET: 8.6; 5 TABLET ORAL at 20:39

## 2018-01-01 RX ADMIN — ASPIRIN 81 MG CHEWABLE TABLET 81 MG: 81 TABLET CHEWABLE at 08:50

## 2018-01-01 RX ADMIN — MICONAZOLE NITRATE: 20 POWDER TOPICAL at 21:44

## 2018-01-01 RX ADMIN — MEMANTINE HYDROCHLORIDE 10 MG: 10 TABLET ORAL at 20:32

## 2018-01-01 RX ADMIN — SENNOSIDES AND DOCUSATE SODIUM 2 TABLET: 8.6; 5 TABLET ORAL at 09:11

## 2018-01-01 RX ADMIN — IPRATROPIUM BROMIDE AND ALBUTEROL SULFATE 3 ML: .5; 3 SOLUTION RESPIRATORY (INHALATION) at 16:17

## 2018-01-01 RX ADMIN — METOPROLOL TARTRATE 12.5 MG: 25 TABLET, FILM COATED ORAL at 08:32

## 2018-01-01 RX ADMIN — DONEPEZIL HYDROCHLORIDE 20 MG: 10 TABLET, FILM COATED ORAL at 21:42

## 2018-01-01 RX ADMIN — DOCUSATE SODIUM -SENNOSIDES 2 TABLET: 50; 8.6 TABLET, COATED ORAL at 20:57

## 2018-01-01 RX ADMIN — MODAFINIL 100 MG: 100 TABLET ORAL at 08:38

## 2018-01-01 RX ADMIN — LACTULOSE 10 G: 20 SOLUTION ORAL at 21:44

## 2018-01-01 RX ADMIN — LEVOTHYROXINE SODIUM 50 MCG: 50 TABLET ORAL at 06:25

## 2018-01-01 RX ADMIN — MICONAZOLE NITRATE: 20 POWDER TOPICAL at 21:19

## 2018-01-01 RX ADMIN — ASPIRIN 81 MG CHEWABLE TABLET 81 MG: 81 TABLET CHEWABLE at 08:27

## 2018-01-01 RX ADMIN — MEMANTINE HYDROCHLORIDE 10 MG: 10 TABLET ORAL at 20:14

## 2018-01-01 RX ADMIN — POLYETHYLENE GLYCOL (3350) 17 G: 17 POWDER, FOR SOLUTION ORAL at 09:26

## 2018-01-01 RX ADMIN — POLYETHYLENE GLYCOL 3350 17 G: 17 POWDER, FOR SOLUTION ORAL at 09:53

## 2018-01-01 RX ADMIN — LEVOTHYROXINE SODIUM 50 MCG: 50 TABLET ORAL at 05:46

## 2018-01-01 RX ADMIN — MEMANTINE HYDROCHLORIDE 10 MG: 10 TABLET ORAL at 08:33

## 2018-01-01 RX ADMIN — MICONAZOLE NITRATE: 20 POWDER TOPICAL at 08:48

## 2018-01-01 RX ADMIN — IPRATROPIUM BROMIDE AND ALBUTEROL SULFATE 3 ML: .5; 3 SOLUTION RESPIRATORY (INHALATION) at 22:02

## 2018-01-01 RX ADMIN — DONEPEZIL HYDROCHLORIDE 20 MG: 5 TABLET ORAL at 21:06

## 2018-01-01 RX ADMIN — LACTULOSE 10 G: 20 SOLUTION ORAL at 20:58

## 2018-01-01 RX ADMIN — MEMANTINE HYDROCHLORIDE 10 MG: 10 TABLET ORAL at 10:08

## 2018-01-01 RX ADMIN — SENNOSIDES AND DOCUSATE SODIUM 2 TABLET: 8.6; 5 TABLET ORAL at 08:47

## 2018-01-01 RX ADMIN — VITAMIN D, TAB 1000IU (100/BT) 2000 UNITS: 25 TAB at 09:17

## 2018-01-01 RX ADMIN — VITAMIN D, TAB 1000IU (100/BT) 2000 UNITS: 25 TAB at 20:32

## 2018-01-01 RX ADMIN — TAZOBACTAM SODIUM AND PIPERACILLIN SODIUM 3.38 G: 375; 3 INJECTION, SOLUTION INTRAVENOUS at 19:51

## 2018-01-01 RX ADMIN — DIVALPROEX SODIUM 250 MG: 250 TABLET, FILM COATED, EXTENDED RELEASE ORAL at 20:25

## 2018-01-01 RX ADMIN — LEVOTHYROXINE SODIUM 50 MCG: 50 TABLET ORAL at 06:19

## 2018-01-01 RX ADMIN — VITAMIN D, TAB 1000IU (100/BT) 2000 UNITS: 25 TAB at 22:41

## 2018-01-01 RX ADMIN — MEMANTINE HYDROCHLORIDE 10 MG: 10 TABLET ORAL at 21:29

## 2018-01-01 RX ADMIN — TAMSULOSIN HYDROCHLORIDE 0.4 MG: 0.4 CAPSULE ORAL at 08:46

## 2018-01-01 RX ADMIN — IPRATROPIUM BROMIDE AND ALBUTEROL SULFATE 3 ML: .5; 3 SOLUTION RESPIRATORY (INHALATION) at 00:12

## 2018-01-01 RX ADMIN — MEMANTINE HYDROCHLORIDE 10 MG: 10 TABLET ORAL at 09:04

## 2018-01-01 RX ADMIN — ACETAMINOPHEN 650 MG: 325 TABLET, FILM COATED ORAL at 20:09

## 2018-01-01 RX ADMIN — LACTULOSE 10 G: 20 SOLUTION ORAL at 21:00

## 2018-01-01 RX ADMIN — MICONAZOLE NITRATE: 20 POWDER TOPICAL at 08:34

## 2018-01-01 RX ADMIN — SENNOSIDES AND DOCUSATE SODIUM 2 TABLET: 8.6; 5 TABLET ORAL at 21:18

## 2018-01-01 RX ADMIN — VITAMIN D, TAB 1000IU (100/BT) 2000 UNITS: 25 TAB at 20:21

## 2018-01-01 RX ADMIN — POLYETHYLENE GLYCOL 3350 17 G: 17 POWDER, FOR SOLUTION ORAL at 08:18

## 2018-01-01 RX ADMIN — SENNOSIDES AND DOCUSATE SODIUM 2 TABLET: 8.6; 5 TABLET ORAL at 20:55

## 2018-01-01 RX ADMIN — POLYETHYLENE GLYCOL 3350 17 G: 17 POWDER, FOR SOLUTION ORAL at 09:35

## 2018-01-01 RX ADMIN — AMOXICILLIN AND CLAVULANATE POTASSIUM 400 MG: 400; 57 POWDER, FOR SUSPENSION ORAL at 22:00

## 2018-01-01 RX ADMIN — LEVOTHYROXINE SODIUM 50 MCG: 50 TABLET ORAL at 06:15

## 2018-01-01 RX ADMIN — ISOSORBIDE MONONITRATE 30 MG: 30 TABLET ORAL at 08:37

## 2018-01-01 RX ADMIN — DOCUSATE SODIUM -SENNOSIDES 2 TABLET: 50; 8.6 TABLET, COATED ORAL at 21:42

## 2018-01-01 RX ADMIN — SENNOSIDES AND DOCUSATE SODIUM 2 TABLET: 8.6; 5 TABLET ORAL at 09:41

## 2018-01-01 RX ADMIN — DIVALPROEX SODIUM 250 MG: 250 TABLET, FILM COATED, EXTENDED RELEASE ORAL at 22:00

## 2018-01-01 RX ADMIN — MEMANTINE HYDROCHLORIDE 10 MG: 10 TABLET ORAL at 11:14

## 2018-01-01 RX ADMIN — METOPROLOL TARTRATE 12.5 MG: 25 TABLET, FILM COATED ORAL at 10:10

## 2018-01-01 RX ADMIN — DONEPEZIL HYDROCHLORIDE 20 MG: 5 TABLET ORAL at 21:41

## 2018-01-01 RX ADMIN — POLYETHYLENE GLYCOL (3350) 17 G: 17 POWDER, FOR SOLUTION ORAL at 20:20

## 2018-01-01 RX ADMIN — MODAFINIL 100 MG: 100 TABLET ORAL at 09:06

## 2018-01-01 RX ADMIN — LACTULOSE 10 G: 20 SOLUTION ORAL at 09:31

## 2018-01-01 RX ADMIN — VITAMIN D, TAB 1000IU (100/BT) 2000 UNITS: 25 TAB at 20:35

## 2018-01-01 RX ADMIN — VITAMIN D, TAB 1000IU (100/BT) 2000 UNITS: 25 TAB at 09:02

## 2018-01-01 RX ADMIN — LACTULOSE 10 G: 20 SOLUTION ORAL at 20:24

## 2018-01-01 RX ADMIN — LEVOTHYROXINE SODIUM 50 MCG: 50 TABLET ORAL at 06:26

## 2018-01-01 RX ADMIN — VITAMIN D, TAB 1000IU (100/BT) 2000 UNITS: 25 TAB at 09:31

## 2018-01-01 RX ADMIN — LEVOTHYROXINE SODIUM 50 MCG: 50 TABLET ORAL at 05:36

## 2018-01-01 RX ADMIN — MICONAZOLE NITRATE: 20 POWDER TOPICAL at 09:06

## 2018-01-01 RX ADMIN — MICONAZOLE NITRATE: 20 POWDER TOPICAL at 21:06

## 2018-01-01 RX ADMIN — DIVALPROEX SODIUM 250 MG: 250 TABLET, FILM COATED, EXTENDED RELEASE ORAL at 20:05

## 2018-01-01 RX ADMIN — ASPIRIN 81 MG CHEWABLE TABLET 81 MG: 81 TABLET CHEWABLE at 08:38

## 2018-01-01 RX ADMIN — MICONAZOLE NITRATE: 20 POWDER TOPICAL at 10:01

## 2018-01-01 RX ADMIN — PETROLATUM: 42 OINTMENT TOPICAL at 09:15

## 2018-01-01 RX ADMIN — MICONAZOLE NITRATE: 20 POWDER TOPICAL at 08:23

## 2018-01-01 RX ADMIN — PETROLATUM: 42 OINTMENT TOPICAL at 20:20

## 2018-01-01 RX ADMIN — DIVALPROEX SODIUM 250 MG: 250 TABLET, FILM COATED, EXTENDED RELEASE ORAL at 23:19

## 2018-01-01 RX ADMIN — MEMANTINE HYDROCHLORIDE 10 MG: 10 TABLET, FILM COATED ORAL at 08:37

## 2018-01-01 RX ADMIN — VITAMIN D, TAB 1000IU (100/BT) 2000 UNITS: 25 TAB at 20:15

## 2018-01-01 RX ADMIN — TAZOBACTAM SODIUM AND PIPERACILLIN SODIUM 3.38 G: 375; 3 INJECTION, SOLUTION INTRAVENOUS at 13:33

## 2018-01-01 RX ADMIN — IPRATROPIUM BROMIDE AND ALBUTEROL SULFATE 3 ML: .5; 3 SOLUTION RESPIRATORY (INHALATION) at 15:37

## 2018-01-01 RX ADMIN — VITAMIN D, TAB 1000IU (100/BT) 2000 UNITS: 25 TAB at 09:45

## 2018-01-01 RX ADMIN — ISOSORBIDE MONONITRATE 30 MG: 60 TABLET, EXTENDED RELEASE ORAL at 08:55

## 2018-01-01 RX ADMIN — ISOSORBIDE MONONITRATE 30 MG: 60 TABLET, EXTENDED RELEASE ORAL at 08:06

## 2018-01-01 RX ADMIN — MICONAZOLE NITRATE: 20 POWDER TOPICAL at 20:08

## 2018-01-01 RX ADMIN — TAMSULOSIN HYDROCHLORIDE 0.4 MG: 0.4 CAPSULE ORAL at 08:23

## 2018-01-01 RX ADMIN — LACTULOSE 10 G: 20 SOLUTION ORAL at 09:20

## 2018-01-01 RX ADMIN — TAMSULOSIN HYDROCHLORIDE 0.4 MG: 0.4 CAPSULE ORAL at 08:39

## 2018-01-01 RX ADMIN — MICONAZOLE NITRATE: 20 POWDER TOPICAL at 10:16

## 2018-01-01 RX ADMIN — PETROLATUM: 42 OINTMENT TOPICAL at 09:49

## 2018-01-01 RX ADMIN — MICONAZOLE NITRATE: 20 POWDER TOPICAL at 08:49

## 2018-01-01 RX ADMIN — LACTULOSE 10 G: 20 SOLUTION ORAL at 21:53

## 2018-01-01 RX ADMIN — MICONAZOLE NITRATE: 20 POWDER TOPICAL at 20:53

## 2018-01-01 RX ADMIN — SENNOSIDES AND DOCUSATE SODIUM 2 TABLET: 8.6; 5 TABLET ORAL at 20:34

## 2018-01-01 RX ADMIN — METOPROLOL TARTRATE 12.5 MG: 25 TABLET, FILM COATED ORAL at 09:36

## 2018-01-01 RX ADMIN — VITAMIN D, TAB 1000IU (100/BT) 2000 UNITS: 25 TAB at 09:11

## 2018-01-01 RX ADMIN — MEMANTINE HYDROCHLORIDE 10 MG: 10 TABLET ORAL at 08:52

## 2018-01-01 RX ADMIN — MICONAZOLE NITRATE: 20 POWDER TOPICAL at 09:23

## 2018-01-01 RX ADMIN — SENNOSIDES AND DOCUSATE SODIUM 2 TABLET: 8.6; 5 TABLET ORAL at 08:46

## 2018-01-01 RX ADMIN — TAMSULOSIN HYDROCHLORIDE 0.4 MG: 0.4 CAPSULE ORAL at 08:38

## 2018-01-01 RX ADMIN — LEVOTHYROXINE SODIUM 50 MCG: 50 TABLET ORAL at 05:48

## 2018-01-01 RX ADMIN — MEMANTINE HYDROCHLORIDE 10 MG: 10 TABLET ORAL at 21:33

## 2018-01-01 RX ADMIN — MICONAZOLE NITRATE: 20 POWDER TOPICAL at 21:30

## 2018-01-01 RX ADMIN — LEVOTHYROXINE SODIUM 50 MCG: 50 TABLET ORAL at 05:58

## 2018-01-01 RX ADMIN — MICONAZOLE NITRATE: 20 POWDER TOPICAL at 22:24

## 2018-01-01 RX ADMIN — SENNOSIDES AND DOCUSATE SODIUM 2 TABLET: 8.6; 5 TABLET ORAL at 08:55

## 2018-01-01 RX ADMIN — TAMSULOSIN HYDROCHLORIDE 0.4 MG: 0.4 CAPSULE ORAL at 09:30

## 2018-01-01 RX ADMIN — POLYETHYLENE GLYCOL 3350 17 G: 17 POWDER, FOR SOLUTION ORAL at 08:16

## 2018-01-01 RX ADMIN — DONEPEZIL HYDROCHLORIDE 20 MG: 5 TABLET ORAL at 21:16

## 2018-01-01 RX ADMIN — DIVALPROEX SODIUM 250 MG: 250 TABLET, FILM COATED, EXTENDED RELEASE ORAL at 20:46

## 2018-01-01 RX ADMIN — IPRATROPIUM BROMIDE AND ALBUTEROL SULFATE 3 ML: .5; 3 SOLUTION RESPIRATORY (INHALATION) at 06:29

## 2018-01-01 RX ADMIN — LACTULOSE 10 G: 20 SOLUTION ORAL at 10:03

## 2018-01-01 RX ADMIN — ASPIRIN 81 MG CHEWABLE TABLET 81 MG: 81 TABLET CHEWABLE at 09:59

## 2018-01-01 RX ADMIN — VITAMIN D, TAB 1000IU (100/BT) 2000 UNITS: 25 TAB at 08:35

## 2018-01-01 RX ADMIN — SENNOSIDES AND DOCUSATE SODIUM 2 TABLET: 8.6; 5 TABLET ORAL at 08:26

## 2018-01-01 RX ADMIN — IPRATROPIUM BROMIDE AND ALBUTEROL SULFATE 3 ML: .5; 3 SOLUTION RESPIRATORY (INHALATION) at 06:04

## 2018-01-01 RX ADMIN — VITAMIN D, TAB 1000IU (100/BT) 2000 UNITS: 25 TAB at 09:06

## 2018-01-01 RX ADMIN — MICONAZOLE NITRATE: 20 POWDER TOPICAL at 20:06

## 2018-01-01 RX ADMIN — SENNOSIDES AND DOCUSATE SODIUM 2 TABLET: 8.6; 5 TABLET ORAL at 08:17

## 2018-01-01 RX ADMIN — LACTULOSE 10 G: 20 SOLUTION ORAL at 08:50

## 2018-01-01 RX ADMIN — MICONAZOLE NITRATE: 20 POWDER TOPICAL at 20:39

## 2018-01-01 RX ADMIN — LACTULOSE 10 G: 20 SOLUTION ORAL at 08:29

## 2018-01-01 RX ADMIN — ASPIRIN 81 MG CHEWABLE TABLET 81 MG: 81 TABLET CHEWABLE at 09:31

## 2018-01-01 RX ADMIN — MICONAZOLE NITRATE: 20 POWDER TOPICAL at 10:19

## 2018-01-01 RX ADMIN — DONEPEZIL HYDROCHLORIDE 20 MG: 5 TABLET ORAL at 20:56

## 2018-01-01 RX ADMIN — ASPIRIN 81 MG: 81 TABLET, CHEWABLE ORAL at 08:39

## 2018-01-01 RX ADMIN — MEMANTINE HYDROCHLORIDE 10 MG: 10 TABLET ORAL at 20:08

## 2018-01-01 RX ADMIN — ASPIRIN 81 MG: 81 TABLET, CHEWABLE ORAL at 09:02

## 2018-01-01 RX ADMIN — DONEPEZIL HYDROCHLORIDE 20 MG: 10 TABLET, FILM COATED ORAL at 21:16

## 2018-01-01 RX ADMIN — MEMANTINE HYDROCHLORIDE 10 MG: 10 TABLET ORAL at 19:54

## 2018-01-01 RX ADMIN — VITAMIN D, TAB 1000IU (100/BT) 2000 UNITS: 25 TAB at 10:01

## 2018-01-01 RX ADMIN — SENNOSIDES AND DOCUSATE SODIUM 2 TABLET: 8.6; 5 TABLET ORAL at 20:51

## 2018-01-01 RX ADMIN — VITAMIN D, TAB 1000IU (100/BT) 2000 UNITS: 25 TAB at 08:50

## 2018-01-01 RX ADMIN — SENNOSIDES AND DOCUSATE SODIUM 2 TABLET: 8.6; 5 TABLET ORAL at 20:28

## 2018-01-01 RX ADMIN — LACTULOSE 10 G: 20 SOLUTION ORAL at 08:56

## 2018-01-01 RX ADMIN — LACTULOSE 10 G: 20 SOLUTION ORAL at 20:46

## 2018-01-01 RX ADMIN — MEMANTINE HYDROCHLORIDE 10 MG: 10 TABLET ORAL at 10:44

## 2018-01-01 RX ADMIN — MICONAZOLE NITRATE: 20 POWDER TOPICAL at 21:25

## 2018-01-01 RX ADMIN — PETROLATUM: 42 OINTMENT TOPICAL at 21:20

## 2018-01-01 RX ADMIN — LACTULOSE 10 G: 20 SOLUTION ORAL at 20:57

## 2018-01-01 RX ADMIN — LEVOTHYROXINE SODIUM 50 MCG: 50 TABLET ORAL at 09:02

## 2018-01-01 RX ADMIN — POLYETHYLENE GLYCOL 3350 17 G: 17 POWDER, FOR SOLUTION ORAL at 09:55

## 2018-01-01 RX ADMIN — ISOSORBIDE MONONITRATE 30 MG: 60 TABLET, EXTENDED RELEASE ORAL at 08:38

## 2018-01-01 RX ADMIN — TAMSULOSIN HYDROCHLORIDE 0.4 MG: 0.4 CAPSULE ORAL at 09:45

## 2018-01-01 RX ADMIN — SENNOSIDES AND DOCUSATE SODIUM 2 TABLET: 8.6; 5 TABLET ORAL at 22:41

## 2018-01-01 RX ADMIN — VITAMIN D, TAB 1000IU (100/BT) 2000 UNITS: 25 TAB at 20:28

## 2018-01-01 RX ADMIN — SENNOSIDES AND DOCUSATE SODIUM 2 TABLET: 8.6; 5 TABLET ORAL at 08:39

## 2018-01-01 RX ADMIN — MICONAZOLE NITRATE: 20 POWDER TOPICAL at 08:32

## 2018-01-01 RX ADMIN — VITAMIN D, TAB 1000IU (100/BT) 2000 UNITS: 25 TAB at 09:03

## 2018-01-01 RX ADMIN — TAMSULOSIN HYDROCHLORIDE 0.4 MG: 0.4 CAPSULE ORAL at 08:25

## 2018-01-01 RX ADMIN — TAMSULOSIN HYDROCHLORIDE 0.4 MG: 0.4 CAPSULE ORAL at 10:06

## 2018-01-01 RX ADMIN — MICONAZOLE NITRATE: 20 POWDER TOPICAL at 20:32

## 2018-01-01 RX ADMIN — IPRATROPIUM BROMIDE AND ALBUTEROL SULFATE 3 ML: .5; 3 SOLUTION RESPIRATORY (INHALATION) at 08:47

## 2018-01-01 RX ADMIN — IPRATROPIUM BROMIDE AND ALBUTEROL SULFATE 3 ML: .5; 3 SOLUTION RESPIRATORY (INHALATION) at 17:21

## 2018-01-01 RX ADMIN — ISOSORBIDE MONONITRATE 30 MG: 60 TABLET, EXTENDED RELEASE ORAL at 09:12

## 2018-01-01 RX ADMIN — MICONAZOLE NITRATE: 20 POWDER TOPICAL at 08:33

## 2018-01-01 RX ADMIN — MEMANTINE HYDROCHLORIDE 10 MG: 10 TABLET, FILM COATED ORAL at 20:46

## 2018-01-01 RX ADMIN — LACTULOSE 10 G: 20 SOLUTION ORAL at 09:44

## 2018-01-01 RX ADMIN — DONEPEZIL HYDROCHLORIDE 20 MG: 10 TABLET, FILM COATED ORAL at 21:08

## 2018-01-01 RX ADMIN — MIRTAZAPINE 30 MG: 30 TABLET, FILM COATED ORAL at 20:45

## 2018-01-01 RX ADMIN — TAMSULOSIN HYDROCHLORIDE 0.4 MG: 0.4 CAPSULE ORAL at 10:00

## 2018-01-01 RX ADMIN — MEMANTINE HYDROCHLORIDE 10 MG: 10 TABLET ORAL at 09:02

## 2018-01-01 RX ADMIN — TAMSULOSIN HYDROCHLORIDE 0.4 MG: 0.4 CAPSULE ORAL at 10:08

## 2018-01-01 RX ADMIN — DONEPEZIL HYDROCHLORIDE 20 MG: 5 TABLET ORAL at 22:40

## 2018-01-01 RX ADMIN — LACTULOSE 10 G: 20 SOLUTION ORAL at 08:17

## 2018-01-01 RX ADMIN — MEMANTINE HYDROCHLORIDE 10 MG: 10 TABLET ORAL at 20:38

## 2018-01-01 RX ADMIN — DIVALPROEX SODIUM 250 MG: 250 TABLET, FILM COATED, EXTENDED RELEASE ORAL at 20:11

## 2018-01-01 RX ADMIN — IPRATROPIUM BROMIDE AND ALBUTEROL SULFATE 3 ML: .5; 3 SOLUTION RESPIRATORY (INHALATION) at 23:19

## 2018-01-01 RX ADMIN — LACTULOSE 10 G: 20 SOLUTION ORAL at 19:53

## 2018-01-01 RX ADMIN — POLYETHYLENE GLYCOL 3350 17 G: 17 POWDER, FOR SOLUTION ORAL at 10:00

## 2018-01-01 RX ADMIN — SENNOSIDES AND DOCUSATE SODIUM 2 TABLET: 8.6; 5 TABLET ORAL at 08:12

## 2018-01-01 RX ADMIN — POLYETHYLENE GLYCOL 3350 17 G: 17 POWDER, FOR SOLUTION ORAL at 08:25

## 2018-01-01 RX ADMIN — TAMSULOSIN HYDROCHLORIDE 0.4 MG: 0.4 CAPSULE ORAL at 11:16

## 2018-01-01 RX ADMIN — LACTULOSE 10 G: 20 SOLUTION ORAL at 20:45

## 2018-01-01 RX ADMIN — ASPIRIN 81 MG: 81 TABLET, CHEWABLE ORAL at 08:00

## 2018-01-01 RX ADMIN — POLYETHYLENE GLYCOL 3350 17 G: 17 POWDER, FOR SOLUTION ORAL at 08:55

## 2018-01-01 RX ADMIN — IPRATROPIUM BROMIDE AND ALBUTEROL SULFATE 3 ML: .5; 3 SOLUTION RESPIRATORY (INHALATION) at 22:41

## 2018-01-01 RX ADMIN — MICONAZOLE NITRATE: 20 POWDER TOPICAL at 21:01

## 2018-01-01 RX ADMIN — MODAFINIL 100 MG: 100 TABLET ORAL at 08:37

## 2018-01-01 RX ADMIN — POLYETHYLENE GLYCOL 3350 17 G: 17 POWDER, FOR SOLUTION ORAL at 08:50

## 2018-01-01 RX ADMIN — MICONAZOLE NITRATE: 20 POWDER TOPICAL at 09:55

## 2018-01-01 RX ADMIN — MICONAZOLE NITRATE: 20 POWDER TOPICAL at 21:36

## 2018-01-01 RX ADMIN — ISOSORBIDE MONONITRATE 30 MG: 60 TABLET, EXTENDED RELEASE ORAL at 08:12

## 2018-01-01 RX ADMIN — LACTULOSE 10 G: 20 SOLUTION ORAL at 20:38

## 2018-01-01 RX ADMIN — ASPIRIN 81 MG CHEWABLE TABLET 81 MG: 81 TABLET CHEWABLE at 08:42

## 2018-01-01 RX ADMIN — SENNOSIDES AND DOCUSATE SODIUM 2 TABLET: 8.6; 5 TABLET ORAL at 08:23

## 2018-01-01 RX ADMIN — POLYETHYLENE GLYCOL 3350 17 G: 17 POWDER, FOR SOLUTION ORAL at 11:15

## 2018-01-01 RX ADMIN — METOPROLOL TARTRATE 12.5 MG: 25 TABLET, FILM COATED ORAL at 22:00

## 2018-01-01 RX ADMIN — IPRATROPIUM BROMIDE AND ALBUTEROL SULFATE 3 ML: .5; 3 SOLUTION RESPIRATORY (INHALATION) at 17:15

## 2018-01-01 RX ADMIN — PETROLATUM: 42 OINTMENT TOPICAL at 08:50

## 2018-01-01 RX ADMIN — POLYETHYLENE GLYCOL 3350 17 G: 17 POWDER, FOR SOLUTION ORAL at 09:07

## 2018-01-01 RX ADMIN — ISOSORBIDE MONONITRATE 30 MG: 30 TABLET ORAL at 08:35

## 2018-01-01 RX ADMIN — VITAMIN D, TAB 1000IU (100/BT) 2000 UNITS: 25 TAB at 09:55

## 2018-01-01 RX ADMIN — MICONAZOLE NITRATE: 20 POWDER TOPICAL at 21:11

## 2018-01-01 RX ADMIN — DOCUSATE SODIUM AND SENNOSIDES 2 TABLET: 8.6; 5 TABLET, FILM COATED ORAL at 22:03

## 2018-01-01 RX ADMIN — MEMANTINE HYDROCHLORIDE 10 MG: 10 TABLET ORAL at 08:19

## 2018-01-01 RX ADMIN — SENNOSIDES AND DOCUSATE SODIUM 2 TABLET: 8.6; 5 TABLET ORAL at 10:15

## 2018-01-01 RX ADMIN — POLYETHYLENE GLYCOL 3350 17 G: 17 POWDER, FOR SOLUTION ORAL at 09:58

## 2018-01-01 RX ADMIN — MICONAZOLE NITRATE: 20 POWDER TOPICAL at 10:15

## 2018-01-01 RX ADMIN — SENNOSIDES AND DOCUSATE SODIUM 2 TABLET: 8.6; 5 TABLET ORAL at 20:08

## 2018-01-01 RX ADMIN — MEMANTINE HYDROCHLORIDE 10 MG: 10 TABLET ORAL at 09:41

## 2018-01-01 RX ADMIN — LACTULOSE 10 G: 20 SOLUTION ORAL at 09:05

## 2018-01-01 RX ADMIN — LACTULOSE 10 G: 20 SOLUTION ORAL at 20:15

## 2018-01-01 RX ADMIN — ENOXAPARIN SODIUM 40 MG: 40 INJECTION SUBCUTANEOUS at 21:41

## 2018-01-01 RX ADMIN — DONEPEZIL HYDROCHLORIDE 20 MG: 5 TABLET ORAL at 20:07

## 2018-01-01 RX ADMIN — MICONAZOLE NITRATE: 20 POWDER TOPICAL at 09:10

## 2018-01-01 RX ADMIN — DONEPEZIL HYDROCHLORIDE 20 MG: 5 TABLET ORAL at 22:13

## 2018-01-01 RX ADMIN — TAZOBACTAM SODIUM AND PIPERACILLIN SODIUM 3.38 G: 375; 3 INJECTION, SOLUTION INTRAVENOUS at 21:22

## 2018-01-01 RX ADMIN — VITAMIN D, TAB 1000IU (100/BT) 2000 UNITS: 25 TAB at 21:14

## 2018-01-01 RX ADMIN — DONEPEZIL HYDROCHLORIDE 20 MG: 5 TABLET ORAL at 21:14

## 2018-01-01 RX ADMIN — DONEPEZIL HYDROCHLORIDE 20 MG: 10 TABLET, FILM COATED ORAL at 20:20

## 2018-01-01 RX ADMIN — MICONAZOLE NITRATE: 20 POWDER TOPICAL at 09:45

## 2018-01-01 RX ADMIN — POLYETHYLENE GLYCOL 3350 17 G: 17 POWDER, FOR SOLUTION ORAL at 09:25

## 2018-01-01 RX ADMIN — LEVOTHYROXINE SODIUM 50 MCG: 50 TABLET ORAL at 09:25

## 2018-01-01 RX ADMIN — POLYETHYLENE GLYCOL 3350 17 G: 17 POWDER, FOR SOLUTION ORAL at 09:37

## 2018-01-01 RX ADMIN — POLYETHYLENE GLYCOL 3350 17 G: 17 POWDER, FOR SOLUTION ORAL at 09:30

## 2018-01-01 RX ADMIN — MICONAZOLE NITRATE 1 APPLICATION: 20 POWDER TOPICAL at 21:20

## 2018-01-01 RX ADMIN — SENNOSIDES AND DOCUSATE SODIUM 2 TABLET: 8.6; 5 TABLET ORAL at 20:05

## 2018-01-01 RX ADMIN — LEVOTHYROXINE SODIUM 50 MCG: 50 TABLET ORAL at 06:38

## 2018-01-01 RX ADMIN — LACTULOSE 10 G: 20 SOLUTION ORAL at 10:34

## 2018-01-01 RX ADMIN — MICONAZOLE NITRATE: 20 POWDER TOPICAL at 22:20

## 2018-01-01 RX ADMIN — MEMANTINE HYDROCHLORIDE 10 MG: 10 TABLET ORAL at 08:58

## 2018-01-01 RX ADMIN — MEMANTINE HYDROCHLORIDE 10 MG: 10 TABLET ORAL at 23:21

## 2018-01-01 RX ADMIN — VITAMIN D, TAB 1000IU (100/BT) 2000 UNITS: 25 TAB at 20:57

## 2018-01-01 RX ADMIN — ISOSORBIDE MONONITRATE 30 MG: 60 TABLET, EXTENDED RELEASE ORAL at 08:24

## 2018-01-01 RX ADMIN — IPRATROPIUM BROMIDE AND ALBUTEROL SULFATE 3 ML: .5; 3 SOLUTION RESPIRATORY (INHALATION) at 05:16

## 2018-01-01 RX ADMIN — TAMSULOSIN HYDROCHLORIDE 0.4 MG: 0.4 CAPSULE ORAL at 09:55

## 2018-01-01 RX ADMIN — SENNOSIDES AND DOCUSATE SODIUM 2 TABLET: 8.6; 5 TABLET ORAL at 08:35

## 2018-01-01 RX ADMIN — ASPIRIN 81 MG CHEWABLE TABLET 81 MG: 81 TABLET CHEWABLE at 08:17

## 2018-01-01 RX ADMIN — ASPIRIN 81 MG CHEWABLE TABLET 81 MG: 81 TABLET CHEWABLE at 09:43

## 2018-01-01 RX ADMIN — SENNOSIDES AND DOCUSATE SODIUM 2 TABLET: 8.6; 5 TABLET ORAL at 09:20

## 2018-01-01 RX ADMIN — ASPIRIN 81 MG CHEWABLE TABLET 81 MG: 81 TABLET CHEWABLE at 10:10

## 2018-01-01 RX ADMIN — LEVOTHYROXINE SODIUM 50 MCG: 50 TABLET ORAL at 05:47

## 2018-01-01 RX ADMIN — PETROLATUM: 42 OINTMENT TOPICAL at 22:00

## 2018-01-01 RX ADMIN — VITAMIN D, TAB 1000IU (100/BT) 2000 UNITS: 25 TAB at 08:38

## 2018-01-01 RX ADMIN — DONEPEZIL HYDROCHLORIDE 20 MG: 5 TABLET ORAL at 21:00

## 2018-01-01 RX ADMIN — MICONAZOLE NITRATE: 20 POWDER TOPICAL at 08:31

## 2018-01-01 RX ADMIN — MEMANTINE HYDROCHLORIDE 10 MG: 10 TABLET ORAL at 21:44

## 2018-01-01 RX ADMIN — ACETAMINOPHEN 650 MG: 325 TABLET, FILM COATED ORAL at 21:26

## 2018-01-01 RX ADMIN — MEMANTINE HYDROCHLORIDE 10 MG: 10 TABLET ORAL at 10:34

## 2018-01-01 RX ADMIN — LEVOTHYROXINE SODIUM 50 MCG: 50 TABLET ORAL at 06:12

## 2018-01-01 RX ADMIN — AMPICILLIN 500 MG: 500 CAPSULE ORAL at 21:15

## 2018-01-01 RX ADMIN — ASPIRIN 81 MG CHEWABLE TABLET 81 MG: 81 TABLET CHEWABLE at 08:29

## 2018-01-01 RX ADMIN — SENNOSIDES AND DOCUSATE SODIUM 2 TABLET: 8.6; 5 TABLET ORAL at 08:59

## 2018-01-01 RX ADMIN — POLYETHYLENE GLYCOL 3350 17 G: 17 POWDER, FOR SOLUTION ORAL at 08:40

## 2018-01-01 RX ADMIN — VITAMIN D, TAB 1000IU (100/BT) 2000 UNITS: 25 TAB at 22:02

## 2018-01-01 RX ADMIN — MICONAZOLE NITRATE: 20 POWDER TOPICAL at 10:14

## 2018-01-01 RX ADMIN — LACTULOSE 10 G: 20 SOLUTION ORAL at 09:19

## 2018-01-01 RX ADMIN — IPRATROPIUM BROMIDE AND ALBUTEROL SULFATE 3 ML: .5; 3 SOLUTION RESPIRATORY (INHALATION) at 15:56

## 2018-01-01 RX ADMIN — MICONAZOLE NITRATE: 20 POWDER TOPICAL at 22:43

## 2018-01-01 RX ADMIN — MEMANTINE HYDROCHLORIDE 10 MG: 10 TABLET ORAL at 08:23

## 2018-01-01 RX ADMIN — AMPICILLIN 500 MG: 500 CAPSULE ORAL at 21:01

## 2018-01-01 RX ADMIN — VITAMIN D, TAB 1000IU (100/BT) 2000 UNITS: 25 TAB at 08:12

## 2018-01-01 RX ADMIN — ASPIRIN 81 MG CHEWABLE TABLET 81 MG: 81 TABLET CHEWABLE at 08:19

## 2018-01-01 RX ADMIN — PETROLATUM: 42 OINTMENT TOPICAL at 08:47

## 2018-01-01 RX ADMIN — TAMSULOSIN HYDROCHLORIDE 0.4 MG: 0.4 CAPSULE ORAL at 09:16

## 2018-01-01 RX ADMIN — IPRATROPIUM BROMIDE AND ALBUTEROL SULFATE 3 ML: .5; 3 SOLUTION RESPIRATORY (INHALATION) at 23:29

## 2018-01-01 RX ADMIN — ASPIRIN 81 MG CHEWABLE TABLET 81 MG: 81 TABLET CHEWABLE at 10:08

## 2018-01-01 RX ADMIN — ASPIRIN 81 MG CHEWABLE TABLET 81 MG: 81 TABLET CHEWABLE at 10:06

## 2018-01-01 RX ADMIN — SENNOSIDES AND DOCUSATE SODIUM 2 TABLET: 8.6; 5 TABLET ORAL at 08:38

## 2018-01-01 RX ADMIN — MICONAZOLE NITRATE: 20 POWDER TOPICAL at 08:35

## 2018-01-01 RX ADMIN — MICONAZOLE NITRATE: 20 POWDER TOPICAL at 21:42

## 2018-01-01 RX ADMIN — ISOSORBIDE MONONITRATE 30 MG: 30 TABLET ORAL at 09:25

## 2018-01-01 RX ADMIN — LACTULOSE 10 G: 20 SOLUTION ORAL at 21:33

## 2018-01-01 RX ADMIN — SENNOSIDES AND DOCUSATE SODIUM 2 TABLET: 8.6; 5 TABLET ORAL at 22:00

## 2018-01-01 RX ADMIN — VITAMIN D, TAB 1000IU (100/BT) 2000 UNITS: 25 TAB at 21:06

## 2018-01-01 RX ADMIN — SENNOSIDES AND DOCUSATE SODIUM 2 TABLET: 8.6; 5 TABLET ORAL at 10:08

## 2018-01-01 RX ADMIN — LACTULOSE 10 G: 20 SOLUTION ORAL at 08:31

## 2018-01-01 RX ADMIN — DIVALPROEX SODIUM 250 MG: 250 TABLET, FILM COATED, EXTENDED RELEASE ORAL at 20:39

## 2018-01-01 RX ADMIN — MICONAZOLE NITRATE: 20 POWDER TOPICAL at 20:47

## 2018-01-01 RX ADMIN — IPRATROPIUM BROMIDE AND ALBUTEROL SULFATE 3 ML: .5; 3 SOLUTION RESPIRATORY (INHALATION) at 08:46

## 2018-01-01 RX ADMIN — TAZOBACTAM SODIUM AND PIPERACILLIN SODIUM 3.38 G: 375; 3 INJECTION, SOLUTION INTRAVENOUS at 04:32

## 2018-01-01 RX ADMIN — LACTULOSE 10 G: 20 SOLUTION ORAL at 10:19

## 2018-01-01 RX ADMIN — MICONAZOLE NITRATE: 20 POWDER TOPICAL at 10:46

## 2018-01-01 RX ADMIN — SENNOSIDES AND DOCUSATE SODIUM 2 TABLET: 8.6; 5 TABLET ORAL at 21:06

## 2018-01-01 RX ADMIN — PETROLATUM: 42 OINTMENT TOPICAL at 20:41

## 2018-01-01 RX ADMIN — DONEPEZIL HYDROCHLORIDE 20 MG: 5 TABLET ORAL at 23:20

## 2018-01-01 RX ADMIN — VITAMIN D, TAB 1000IU (100/BT) 2000 UNITS: 25 TAB at 10:14

## 2018-01-01 RX ADMIN — ACETAMINOPHEN 650 MG: 325 TABLET, FILM COATED ORAL at 21:44

## 2018-01-01 RX ADMIN — ASPIRIN 81 MG CHEWABLE TABLET 81 MG: 81 TABLET CHEWABLE at 10:05

## 2018-01-01 RX ADMIN — MEMANTINE HYDROCHLORIDE 10 MG: 10 TABLET ORAL at 08:28

## 2018-01-01 RX ADMIN — MEMANTINE HYDROCHLORIDE 10 MG: 10 TABLET, FILM COATED ORAL at 21:42

## 2018-01-01 RX ADMIN — PETROLATUM: 42 OINTMENT TOPICAL at 21:18

## 2018-01-01 RX ADMIN — LACTULOSE 10 G: 20 SOLUTION ORAL at 08:32

## 2018-01-01 RX ADMIN — TAZOBACTAM SODIUM AND PIPERACILLIN SODIUM 3.38 G: 375; 3 INJECTION, SOLUTION INTRAVENOUS at 05:59

## 2018-01-01 RX ADMIN — VITAMIN D, TAB 1000IU (100/BT) 2000 UNITS: 25 TAB at 20:25

## 2018-01-01 RX ADMIN — MEMANTINE HYDROCHLORIDE 10 MG: 10 TABLET ORAL at 08:59

## 2018-01-01 RX ADMIN — SENNOSIDES AND DOCUSATE SODIUM 2 TABLET: 8.6; 5 TABLET ORAL at 09:32

## 2018-01-01 RX ADMIN — METOPROLOL TARTRATE 12.5 MG: 25 TABLET, FILM COATED ORAL at 20:44

## 2018-01-01 RX ADMIN — TAMSULOSIN HYDROCHLORIDE 0.4 MG: 0.4 CAPSULE ORAL at 08:47

## 2018-01-01 RX ADMIN — SENNOSIDES AND DOCUSATE SODIUM 2 TABLET: 8.6; 5 TABLET ORAL at 20:20

## 2018-01-01 RX ADMIN — AMPICILLIN 500 MG: 500 CAPSULE ORAL at 15:28

## 2018-01-01 RX ADMIN — SENNOSIDES AND DOCUSATE SODIUM 2 TABLET: 8.6; 5 TABLET ORAL at 21:55

## 2018-01-01 RX ADMIN — LEVOTHYROXINE SODIUM 50 MCG: 50 TABLET ORAL at 06:23

## 2018-01-01 RX ADMIN — IPRATROPIUM BROMIDE AND ALBUTEROL SULFATE 3 ML: .5; 3 SOLUTION RESPIRATORY (INHALATION) at 06:17

## 2018-01-01 RX ADMIN — METOPROLOL TARTRATE 12.5 MG: 25 TABLET, FILM COATED ORAL at 20:05

## 2018-01-01 RX ADMIN — VITAMIN D, TAB 1000IU (100/BT) 2000 UNITS: 25 TAB at 22:13

## 2018-01-01 RX ADMIN — LACTULOSE 10 G: 20 SOLUTION ORAL at 22:02

## 2018-01-01 RX ADMIN — MICONAZOLE NITRATE: 20 POWDER TOPICAL at 10:45

## 2018-01-01 RX ADMIN — MEMANTINE HYDROCHLORIDE 10 MG: 10 TABLET, FILM COATED ORAL at 21:08

## 2018-01-01 RX ADMIN — MICONAZOLE NITRATE 1 APPLICATION: 20 POWDER TOPICAL at 21:17

## 2018-01-01 RX ADMIN — MICONAZOLE NITRATE: 20 POWDER TOPICAL at 20:28

## 2018-01-01 RX ADMIN — MICONAZOLE NITRATE: 20 POWDER TOPICAL at 09:02

## 2018-01-01 RX ADMIN — MEMANTINE HYDROCHLORIDE 10 MG: 10 TABLET ORAL at 08:46

## 2018-01-01 RX ADMIN — MEMANTINE HYDROCHLORIDE 10 MG: 10 TABLET, FILM COATED ORAL at 21:16

## 2018-01-01 RX ADMIN — TAZOBACTAM SODIUM AND PIPERACILLIN SODIUM 3.38 G: 375; 3 INJECTION, SOLUTION INTRAVENOUS at 04:42

## 2018-01-01 RX ADMIN — MEMANTINE HYDROCHLORIDE 10 MG: 10 TABLET ORAL at 21:14

## 2018-01-01 RX ADMIN — MODAFINIL 100 MG: 100 TABLET ORAL at 08:21

## 2018-01-01 RX ADMIN — LACTULOSE 10 G: 20 SOLUTION ORAL at 21:36

## 2018-01-01 RX ADMIN — VITAMIN D, TAB 1000IU (100/BT) 2000 UNITS: 25 TAB at 09:37

## 2018-01-01 RX ADMIN — SENNOSIDES AND DOCUSATE SODIUM 2 TABLET: 8.6; 5 TABLET ORAL at 21:33

## 2018-01-01 RX ADMIN — TAMSULOSIN HYDROCHLORIDE 0.4 MG: 0.4 CAPSULE ORAL at 10:15

## 2018-01-01 RX ADMIN — DONEPEZIL HYDROCHLORIDE 20 MG: 5 TABLET ORAL at 20:19

## 2018-01-01 RX ADMIN — IPRATROPIUM BROMIDE AND ALBUTEROL SULFATE 3 ML: .5; 3 SOLUTION RESPIRATORY (INHALATION) at 16:31

## 2018-01-01 RX ADMIN — SENNOSIDES AND DOCUSATE SODIUM 2 TABLET: 8.6; 5 TABLET ORAL at 20:53

## 2018-01-01 RX ADMIN — METOPROLOL TARTRATE 12.5 MG: 25 TABLET, FILM COATED ORAL at 20:22

## 2018-01-01 RX ADMIN — TAZOBACTAM SODIUM AND PIPERACILLIN SODIUM 3.38 G: 375; 3 INJECTION, SOLUTION INTRAVENOUS at 05:07

## 2018-01-01 RX ADMIN — ISOSORBIDE MONONITRATE 30 MG: 60 TABLET, EXTENDED RELEASE ORAL at 08:23

## 2018-01-01 RX ADMIN — VITAMIN D, TAB 1000IU (100/BT) 2000 UNITS: 25 TAB at 08:19

## 2018-01-01 RX ADMIN — DONEPEZIL HYDROCHLORIDE 20 MG: 5 TABLET ORAL at 21:33

## 2018-01-01 RX ADMIN — MEMANTINE HYDROCHLORIDE 10 MG: 10 TABLET ORAL at 22:13

## 2018-01-01 RX ADMIN — PETROLATUM: 42 OINTMENT TOPICAL at 21:07

## 2018-01-01 RX ADMIN — MEMANTINE HYDROCHLORIDE 10 MG: 10 TABLET ORAL at 08:12

## 2018-01-01 RX ADMIN — MICONAZOLE NITRATE: 20 POWDER TOPICAL at 21:46

## 2018-01-01 RX ADMIN — TAMSULOSIN HYDROCHLORIDE 0.4 MG: 0.4 CAPSULE ORAL at 08:56

## 2018-01-01 RX ADMIN — VITAMIN D, TAB 1000IU (100/BT) 2000 UNITS: 25 TAB at 10:46

## 2018-01-01 RX ADMIN — PETROLATUM: 42 OINTMENT TOPICAL at 09:38

## 2018-01-01 RX ADMIN — ASPIRIN 81 MG CHEWABLE TABLET 81 MG: 81 TABLET CHEWABLE at 09:17

## 2018-01-01 RX ADMIN — ASPIRIN 81 MG CHEWABLE TABLET 81 MG: 81 TABLET CHEWABLE at 09:37

## 2018-01-01 RX ADMIN — METOPROLOL TARTRATE 12.5 MG: 25 TABLET, FILM COATED ORAL at 20:31

## 2018-01-01 RX ADMIN — VITAMIN D, TAB 1000IU (100/BT) 2000 UNITS: 25 TAB at 20:56

## 2018-01-01 RX ADMIN — LACTULOSE 10 G: 20 SOLUTION ORAL at 08:35

## 2018-01-01 RX ADMIN — MICONAZOLE NITRATE 1 APPLICATION: 20 POWDER TOPICAL at 10:06

## 2018-01-01 RX ADMIN — SENNOSIDES AND DOCUSATE SODIUM 2 TABLET: 8.6; 5 TABLET ORAL at 10:04

## 2018-01-01 RX ADMIN — TAMSULOSIN HYDROCHLORIDE 0.4 MG: 0.4 CAPSULE ORAL at 08:35

## 2018-01-01 RX ADMIN — LACTULOSE 10 G: 20 SOLUTION ORAL at 08:28

## 2018-01-01 RX ADMIN — ISOSORBIDE MONONITRATE 30 MG: 60 TABLET, EXTENDED RELEASE ORAL at 09:17

## 2018-01-01 RX ADMIN — ASPIRIN 81 MG CHEWABLE TABLET 81 MG: 81 TABLET CHEWABLE at 09:51

## 2018-01-01 RX ADMIN — DONEPEZIL HYDROCHLORIDE 20 MG: 5 TABLET ORAL at 22:02

## 2018-01-01 RX ADMIN — DIVALPROEX SODIUM 250 MG: 250 TABLET, FILM COATED, EXTENDED RELEASE ORAL at 20:38

## 2018-01-01 RX ADMIN — IPRATROPIUM BROMIDE AND ALBUTEROL SULFATE 3 ML: .5; 3 SOLUTION RESPIRATORY (INHALATION) at 16:11

## 2018-01-01 RX ADMIN — DONEPEZIL HYDROCHLORIDE 20 MG: 5 TABLET ORAL at 20:21

## 2018-01-01 RX ADMIN — IPRATROPIUM BROMIDE AND ALBUTEROL SULFATE 3 ML: .5; 3 SOLUTION RESPIRATORY (INHALATION) at 14:07

## 2018-01-01 RX ADMIN — ASPIRIN 81 MG CHEWABLE TABLET 81 MG: 81 TABLET CHEWABLE at 09:06

## 2018-01-01 RX ADMIN — MEMANTINE HYDROCHLORIDE 10 MG: 10 TABLET ORAL at 10:06

## 2018-01-01 RX ADMIN — VITAMIN D, TAB 1000IU (100/BT) 2000 UNITS: 25 TAB at 09:05

## 2018-01-01 RX ADMIN — MEMANTINE HYDROCHLORIDE 10 MG: 10 TABLET ORAL at 20:09

## 2018-01-01 RX ADMIN — POLYETHYLENE GLYCOL 3350 17 G: 17 POWDER, FOR SOLUTION ORAL at 09:59

## 2018-01-01 RX ADMIN — ASPIRIN 81 MG CHEWABLE TABLET 81 MG: 81 TABLET CHEWABLE at 08:24

## 2018-01-01 RX ADMIN — POLYETHYLENE GLYCOL 3350 17 G: 17 POWDER, FOR SOLUTION ORAL at 10:44

## 2018-01-01 RX ADMIN — LACTULOSE 10 G: 20 SOLUTION ORAL at 09:41

## 2018-01-01 RX ADMIN — MEMANTINE HYDROCHLORIDE 10 MG: 10 TABLET, FILM COATED ORAL at 20:57

## 2018-01-01 RX ADMIN — TAMSULOSIN HYDROCHLORIDE 0.4 MG: 0.4 CAPSULE ORAL at 10:05

## 2018-01-01 RX ADMIN — DIVALPROEX SODIUM 250 MG: 250 TABLET, FILM COATED, EXTENDED RELEASE ORAL at 22:43

## 2018-01-01 RX ADMIN — LACTULOSE 10 G: 20 SOLUTION ORAL at 20:19

## 2018-01-01 RX ADMIN — MODAFINIL 100 MG: 100 TABLET ORAL at 09:35

## 2018-01-01 RX ADMIN — AMLODIPINE BESYLATE 2.5 MG: 2.5 TABLET ORAL at 08:37

## 2018-01-01 RX ADMIN — TAZOBACTAM SODIUM AND PIPERACILLIN SODIUM 3.38 G: 375; 3 INJECTION, SOLUTION INTRAVENOUS at 04:49

## 2018-01-01 RX ADMIN — DIVALPROEX SODIUM 250 MG: 250 TABLET, FILM COATED, EXTENDED RELEASE ORAL at 20:23

## 2018-01-01 RX ADMIN — MEMANTINE HYDROCHLORIDE 10 MG: 10 TABLET ORAL at 08:50

## 2018-01-01 RX ADMIN — LEVOTHYROXINE SODIUM 50 MCG: 50 TABLET ORAL at 06:22

## 2018-01-01 RX ADMIN — TAMSULOSIN HYDROCHLORIDE 0.4 MG: 0.4 CAPSULE ORAL at 10:46

## 2018-01-01 RX ADMIN — PETROLATUM: 42 OINTMENT TOPICAL at 20:42

## 2018-01-01 RX ADMIN — MICONAZOLE NITRATE: 20 POWDER TOPICAL at 08:27

## 2018-01-01 RX ADMIN — ASPIRIN 81 MG CHEWABLE TABLET 81 MG: 81 TABLET CHEWABLE at 08:56

## 2018-01-01 RX ADMIN — SENNOSIDES AND DOCUSATE SODIUM 2 TABLET: 8.6; 5 TABLET ORAL at 10:46

## 2018-01-01 RX ADMIN — AMPICILLIN 500 MG: 500 CAPSULE ORAL at 05:07

## 2018-01-01 RX ADMIN — SENNOSIDES AND DOCUSATE SODIUM 2 TABLET: 8.6; 5 TABLET ORAL at 08:07

## 2018-01-01 RX ADMIN — MICONAZOLE NITRATE: 20 POWDER TOPICAL at 21:55

## 2018-01-01 RX ADMIN — MEMANTINE HYDROCHLORIDE 10 MG: 10 TABLET ORAL at 20:19

## 2018-01-01 RX ADMIN — DEXTROSE AND SODIUM CHLORIDE 125 ML/HR: 5; 900 INJECTION, SOLUTION INTRAVENOUS at 09:34

## 2018-01-01 RX ADMIN — TAZOBACTAM SODIUM AND PIPERACILLIN SODIUM 3.38 G: 375; 3 INJECTION, SOLUTION INTRAVENOUS at 15:29

## 2018-01-01 RX ADMIN — IPRATROPIUM BROMIDE AND ALBUTEROL SULFATE 3 ML: .5; 3 SOLUTION RESPIRATORY (INHALATION) at 06:02

## 2018-01-01 RX ADMIN — AMPICILLIN 500 MG: 500 CAPSULE ORAL at 22:00

## 2018-01-01 RX ADMIN — DONEPEZIL HYDROCHLORIDE 20 MG: 5 TABLET ORAL at 20:32

## 2018-01-01 RX ADMIN — MICONAZOLE NITRATE 1 APPLICATION: 20 POWDER TOPICAL at 08:51

## 2018-01-01 RX ADMIN — LACTULOSE 10 G: 20 SOLUTION ORAL at 20:37

## 2018-01-01 RX ADMIN — VITAMIN D, TAB 1000IU (100/BT) 2000 UNITS: 25 TAB at 21:09

## 2018-01-01 RX ADMIN — METOPROLOL TARTRATE 12.5 MG: 25 TABLET, FILM COATED ORAL at 21:04

## 2018-01-01 RX ADMIN — SENNOSIDES AND DOCUSATE SODIUM 2 TABLET: 8.6; 5 TABLET ORAL at 20:46

## 2018-01-01 RX ADMIN — DONEPEZIL HYDROCHLORIDE 20 MG: 5 TABLET ORAL at 19:53

## 2018-01-01 RX ADMIN — TAMSULOSIN HYDROCHLORIDE 0.4 MG: 0.4 CAPSULE ORAL at 10:34

## 2018-01-01 RX ADMIN — LEVOTHYROXINE SODIUM 50 MCG: 50 TABLET ORAL at 06:01

## 2018-01-01 RX ADMIN — LACTULOSE 10 G: 20 SOLUTION ORAL at 21:30

## 2018-01-01 RX ADMIN — ASPIRIN 81 MG CHEWABLE TABLET 81 MG: 81 TABLET CHEWABLE at 08:51

## 2018-01-01 RX ADMIN — MEMANTINE HYDROCHLORIDE 10 MG: 10 TABLET ORAL at 20:56

## 2018-01-01 RX ADMIN — MODAFINIL 100 MG: 100 TABLET ORAL at 09:03

## 2018-01-01 RX ADMIN — LACTULOSE 10 G: 20 SOLUTION ORAL at 10:13

## 2018-01-01 RX ADMIN — ISOSORBIDE MONONITRATE 30 MG: 60 TABLET, EXTENDED RELEASE ORAL at 08:19

## 2018-01-01 RX ADMIN — VITAMIN D, TAB 1000IU (100/BT) 2000 UNITS: 25 TAB at 23:20

## 2018-01-01 RX ADMIN — SENNOSIDES AND DOCUSATE SODIUM 2 TABLET: 8.6; 5 TABLET ORAL at 08:32

## 2018-01-01 RX ADMIN — LACTULOSE 10 G: 20 SOLUTION ORAL at 09:37

## 2018-01-01 RX ADMIN — IPRATROPIUM BROMIDE AND ALBUTEROL SULFATE 3 ML: .5; 3 SOLUTION RESPIRATORY (INHALATION) at 22:14

## 2018-01-01 RX ADMIN — VITAMIN D, TAB 1000IU (100/BT) 2000 UNITS: 25 TAB at 22:00

## 2018-01-01 RX ADMIN — SENNOSIDES AND DOCUSATE SODIUM 2 TABLET: 8.6; 5 TABLET ORAL at 10:03

## 2018-01-01 RX ADMIN — DONEPEZIL HYDROCHLORIDE 20 MG: 10 TABLET, FILM COATED ORAL at 20:45

## 2018-01-01 RX ADMIN — ASPIRIN 81 MG CHEWABLE TABLET 81 MG: 81 TABLET CHEWABLE at 09:32

## 2018-01-01 RX ADMIN — ASPIRIN 81 MG CHEWABLE TABLET 81 MG: 81 TABLET CHEWABLE at 08:36

## 2018-01-01 RX ADMIN — SENNOSIDES AND DOCUSATE SODIUM 2 TABLET: 8.6; 5 TABLET ORAL at 09:51

## 2018-01-01 RX ADMIN — PETROLATUM: 42 OINTMENT TOPICAL at 08:24

## 2018-01-01 RX ADMIN — MICONAZOLE NITRATE: 20 POWDER TOPICAL at 08:12

## 2018-01-01 RX ADMIN — PETROLATUM: 42 OINTMENT TOPICAL at 09:36

## 2018-01-01 RX ADMIN — TAZOBACTAM SODIUM AND PIPERACILLIN SODIUM 3.38 G: 375; 3 INJECTION, SOLUTION INTRAVENOUS at 14:24

## 2018-01-01 RX ADMIN — LACTULOSE 10 G: 20 SOLUTION ORAL at 09:59

## 2018-01-01 RX ADMIN — MEMANTINE HYDROCHLORIDE 10 MG: 10 TABLET ORAL at 21:06

## 2018-01-01 RX ADMIN — MICONAZOLE NITRATE: 20 POWDER TOPICAL at 08:51

## 2018-01-01 RX ADMIN — ASPIRIN 81 MG CHEWABLE TABLET 81 MG: 81 TABLET CHEWABLE at 08:58

## 2018-01-01 RX ADMIN — TAMSULOSIN HYDROCHLORIDE 0.4 MG: 0.4 CAPSULE ORAL at 09:57

## 2018-01-01 RX ADMIN — AMPICILLIN 500 MG: 500 CAPSULE ORAL at 15:51

## 2018-01-01 RX ADMIN — MICONAZOLE NITRATE: 20 POWDER TOPICAL at 08:59

## 2018-01-01 RX ADMIN — LEVOTHYROXINE SODIUM 50 MCG: 50 TABLET ORAL at 06:37

## 2018-01-01 RX ADMIN — LEVOTHYROXINE SODIUM 50 MCG: 50 TABLET ORAL at 06:16

## 2018-01-01 RX ADMIN — MICONAZOLE NITRATE: 20 POWDER TOPICAL at 09:36

## 2018-01-01 RX ADMIN — LACTULOSE 10 G: 20 SOLUTION ORAL at 20:40

## 2018-01-01 RX ADMIN — DIVALPROEX SODIUM 250 MG: 250 TABLET, FILM COATED, EXTENDED RELEASE ORAL at 20:10

## 2018-01-01 RX ADMIN — POLYETHYLENE GLYCOL 3350 17 G: 17 POWDER, FOR SOLUTION ORAL at 10:13

## 2018-01-01 RX ADMIN — MEMANTINE HYDROCHLORIDE 10 MG: 10 TABLET, FILM COATED ORAL at 09:02

## 2018-01-01 RX ADMIN — IPRATROPIUM BROMIDE AND ALBUTEROL SULFATE 3 ML: .5; 3 SOLUTION RESPIRATORY (INHALATION) at 06:37

## 2018-01-01 RX ADMIN — ASPIRIN 81 MG: 81 TABLET, CHEWABLE ORAL at 09:25

## 2018-01-01 RX ADMIN — MEMANTINE HYDROCHLORIDE 10 MG: 10 TABLET ORAL at 21:26

## 2018-01-01 RX ADMIN — POLYETHYLENE GLYCOL 3350 17 G: 17 POWDER, FOR SOLUTION ORAL at 10:05

## 2018-01-01 RX ADMIN — MEMANTINE HYDROCHLORIDE 10 MG: 10 TABLET ORAL at 21:17

## 2018-01-01 RX ADMIN — DIVALPROEX SODIUM 250 MG: 250 TABLET, FILM COATED, EXTENDED RELEASE ORAL at 20:52

## 2018-01-01 RX ADMIN — MICONAZOLE NITRATE: 20 POWDER TOPICAL at 21:18

## 2018-01-01 RX ADMIN — MEMANTINE HYDROCHLORIDE 10 MG: 10 TABLET ORAL at 21:22

## 2018-01-01 RX ADMIN — MICONAZOLE NITRATE: 20 POWDER TOPICAL at 21:09

## 2018-01-01 RX ADMIN — VITAMIN D, TAB 1000IU (100/BT) 2000 UNITS: 25 TAB at 21:11

## 2018-01-01 RX ADMIN — ASPIRIN 81 MG CHEWABLE TABLET 81 MG: 81 TABLET CHEWABLE at 09:18

## 2018-01-01 RX ADMIN — MEMANTINE HYDROCHLORIDE 10 MG: 10 TABLET ORAL at 20:15

## 2018-01-01 RX ADMIN — MICONAZOLE NITRATE: 20 POWDER TOPICAL at 20:42

## 2018-01-01 RX ADMIN — MEMANTINE HYDROCHLORIDE 10 MG: 10 TABLET ORAL at 08:07

## 2018-01-01 RX ADMIN — MICONAZOLE NITRATE: 20 POWDER TOPICAL at 23:21

## 2018-01-01 RX ADMIN — AMPICILLIN 500 MG: 500 CAPSULE ORAL at 17:19

## 2018-01-01 RX ADMIN — MEMANTINE HYDROCHLORIDE 10 MG: 10 TABLET, FILM COATED ORAL at 20:20

## 2018-01-01 RX ADMIN — SENNOSIDES AND DOCUSATE SODIUM 2 TABLET: 8.6; 5 TABLET ORAL at 21:04

## 2018-01-01 RX ADMIN — MEMANTINE HYDROCHLORIDE 10 MG: 10 TABLET ORAL at 22:02

## 2018-01-01 RX ADMIN — VITAMIN D, TAB 1000IU (100/BT) 2000 UNITS: 25 TAB at 20:34

## 2018-01-01 RX ADMIN — MEMANTINE HYDROCHLORIDE 10 MG: 10 TABLET ORAL at 08:35

## 2018-01-01 RX ADMIN — IPRATROPIUM BROMIDE AND ALBUTEROL SULFATE 3 ML: .5; 3 SOLUTION RESPIRATORY (INHALATION) at 00:21

## 2018-01-01 RX ADMIN — MICONAZOLE NITRATE: 20 POWDER TOPICAL at 08:54

## 2018-01-01 RX ADMIN — LACTULOSE 10 G: 20 SOLUTION ORAL at 20:09

## 2018-01-01 RX ADMIN — PETROLATUM: 42 OINTMENT TOPICAL at 08:37

## 2018-01-01 RX ADMIN — SENNOSIDES AND DOCUSATE SODIUM 2 TABLET: 8.6; 5 TABLET ORAL at 21:17

## 2018-01-01 RX ADMIN — TAZOBACTAM SODIUM AND PIPERACILLIN SODIUM 3.38 G: 375; 3 INJECTION, SOLUTION INTRAVENOUS at 11:20

## 2018-01-01 RX ADMIN — MEMANTINE HYDROCHLORIDE 10 MG: 10 TABLET ORAL at 08:55

## 2018-01-01 RX ADMIN — ISOSORBIDE MONONITRATE 30 MG: 60 TABLET, EXTENDED RELEASE ORAL at 10:14

## 2018-01-01 RX ADMIN — ASPIRIN 81 MG CHEWABLE TABLET 81 MG: 81 TABLET CHEWABLE at 08:46

## 2018-01-01 RX ADMIN — METOPROLOL TARTRATE 12.5 MG: 25 TABLET, FILM COATED ORAL at 08:46

## 2018-01-01 RX ADMIN — ASPIRIN 81 MG CHEWABLE TABLET 81 MG: 81 TABLET CHEWABLE at 09:55

## 2018-01-01 RX ADMIN — MICONAZOLE NITRATE: 20 POWDER TOPICAL at 09:18

## 2018-01-01 RX ADMIN — VITAMIN D, TAB 1000IU (100/BT) 2000 UNITS: 25 TAB at 20:19

## 2018-01-01 RX ADMIN — TAMSULOSIN HYDROCHLORIDE 0.4 MG: 0.4 CAPSULE ORAL at 09:02

## 2018-01-01 RX ADMIN — ISOSORBIDE MONONITRATE 30 MG: 60 TABLET, EXTENDED RELEASE ORAL at 09:02

## 2018-01-01 RX ADMIN — POLYETHYLENE GLYCOL 3350 17 G: 17 POWDER, FOR SOLUTION ORAL at 09:31

## 2018-01-01 RX ADMIN — VITAMIN D, TAB 1000IU (100/BT) 2000 UNITS: 25 TAB at 20:50

## 2018-01-01 RX ADMIN — ACETAMINOPHEN 650 MG: 325 TABLET, FILM COATED ORAL at 22:03

## 2018-01-01 RX ADMIN — POLYETHYLENE GLYCOL 3350 17 G: 17 POWDER, FOR SOLUTION ORAL at 08:34

## 2018-01-01 RX ADMIN — SENNOSIDES AND DOCUSATE SODIUM 2 TABLET: 8.6; 5 TABLET ORAL at 21:36

## 2018-01-01 RX ADMIN — LACTULOSE 10 G: 20 SOLUTION ORAL at 21:06

## 2018-01-01 RX ADMIN — POLYETHYLENE GLYCOL 3350 17 G: 17 POWDER, FOR SOLUTION ORAL at 10:07

## 2018-01-01 RX ADMIN — DIVALPROEX SODIUM 250 MG: 250 TABLET, FILM COATED, EXTENDED RELEASE ORAL at 20:55

## 2018-01-01 RX ADMIN — VITAMIN D, TAB 1000IU (100/BT) 2000 UNITS: 25 TAB at 08:51

## 2018-01-01 RX ADMIN — SENNOSIDES AND DOCUSATE SODIUM 2 TABLET: 8.6; 5 TABLET ORAL at 21:31

## 2018-01-01 RX ADMIN — MEMANTINE HYDROCHLORIDE 10 MG: 10 TABLET ORAL at 08:30

## 2018-01-01 RX ADMIN — VITAMIN D, TAB 1000IU (100/BT) 2000 UNITS: 25 TAB at 08:00

## 2018-01-01 RX ADMIN — VITAMIN D, TAB 1000IU (100/BT) 2000 UNITS: 25 TAB at 21:55

## 2018-01-01 RX ADMIN — VITAMIN D, TAB 1000IU (100/BT) 2000 UNITS: 25 TAB at 20:53

## 2018-01-01 RX ADMIN — MEMANTINE HYDROCHLORIDE 10 MG: 10 TABLET ORAL at 20:53

## 2018-01-01 RX ADMIN — POLYETHYLENE GLYCOL 3350 17 G: 17 POWDER, FOR SOLUTION ORAL at 08:12

## 2018-01-01 RX ADMIN — VITAMIN D, TAB 1000IU (100/BT) 2000 UNITS: 25 TAB at 21:20

## 2018-01-01 RX ADMIN — VITAMIN D, TAB 1000IU (100/BT) 2000 UNITS: 25 TAB at 20:23

## 2018-01-01 RX ADMIN — TAMSULOSIN HYDROCHLORIDE 0.4 MG: 0.4 CAPSULE ORAL at 09:19

## 2018-01-01 RX ADMIN — POLYETHYLENE GLYCOL 3350 17 G: 17 POWDER, FOR SOLUTION ORAL at 08:23

## 2018-01-01 RX ADMIN — MODAFINIL 100 MG: 100 TABLET ORAL at 09:31

## 2018-01-01 RX ADMIN — LACTULOSE 10 G: 20 SOLUTION ORAL at 23:20

## 2018-01-01 RX ADMIN — MICONAZOLE NITRATE: 20 POWDER TOPICAL at 09:00

## 2018-01-01 RX ADMIN — MEMANTINE HYDROCHLORIDE 10 MG: 10 TABLET ORAL at 08:25

## 2018-01-01 RX ADMIN — TAMSULOSIN HYDROCHLORIDE 0.4 MG: 0.4 CAPSULE ORAL at 09:43

## 2018-01-01 RX ADMIN — LEVOTHYROXINE SODIUM 50 MCG: 50 TABLET ORAL at 06:14

## 2018-01-01 RX ADMIN — ISOSORBIDE MONONITRATE 30 MG: 60 TABLET, EXTENDED RELEASE ORAL at 10:47

## 2018-01-01 RX ADMIN — PETROLATUM: 42 OINTMENT TOPICAL at 09:28

## 2018-01-01 RX ADMIN — ASPIRIN 81 MG CHEWABLE TABLET 81 MG: 81 TABLET CHEWABLE at 09:45

## 2018-01-01 RX ADMIN — LACTULOSE 10 G: 20 SOLUTION ORAL at 20:44

## 2018-01-01 RX ADMIN — PETROLATUM: 42 OINTMENT TOPICAL at 10:18

## 2018-01-01 RX ADMIN — PETROLATUM: 42 OINTMENT TOPICAL at 09:59

## 2018-01-01 RX ADMIN — VITAMIN D, TAB 1000IU (100/BT) 2000 UNITS: 25 TAB at 20:10

## 2018-01-01 RX ADMIN — ASPIRIN 81 MG CHEWABLE TABLET 81 MG: 81 TABLET CHEWABLE at 09:03

## 2018-01-01 RX ADMIN — DIVALPROEX SODIUM 250 MG: 250 TABLET, FILM COATED, EXTENDED RELEASE ORAL at 20:53

## 2018-01-01 RX ADMIN — VITAMIN D, TAB 1000IU (100/BT) 2000 UNITS: 25 TAB at 09:20

## 2018-01-01 RX ADMIN — METOPROLOL TARTRATE 12.5 MG: 25 TABLET, FILM COATED ORAL at 21:55

## 2018-01-01 RX ADMIN — ASPIRIN 81 MG CHEWABLE TABLET 81 MG: 81 TABLET CHEWABLE at 10:00

## 2018-01-01 RX ADMIN — LEVOTHYROXINE SODIUM 50 MCG: 50 TABLET ORAL at 06:43

## 2018-01-01 RX ADMIN — MICONAZOLE NITRATE: 20 POWDER TOPICAL at 20:56

## 2018-01-01 RX ADMIN — ASPIRIN 81 MG CHEWABLE TABLET 81 MG: 81 TABLET CHEWABLE at 10:03

## 2018-01-01 RX ADMIN — LEVOTHYROXINE SODIUM 50 MCG: 50 TABLET ORAL at 05:22

## 2018-01-01 RX ADMIN — DONEPEZIL HYDROCHLORIDE 20 MG: 5 TABLET ORAL at 00:09

## 2018-01-01 RX ADMIN — PETROLATUM: 42 OINTMENT TOPICAL at 10:16

## 2018-01-01 RX ADMIN — SENNOSIDES AND DOCUSATE SODIUM 2 TABLET: 8.6; 5 TABLET ORAL at 10:19

## 2018-01-01 RX ADMIN — MEMANTINE HYDROCHLORIDE 10 MG: 10 TABLET ORAL at 22:40

## 2018-01-01 RX ADMIN — LACTULOSE 10 G: 20 SOLUTION ORAL at 21:31

## 2018-01-01 RX ADMIN — LEVOTHYROXINE SODIUM 50 MCG: 50 TABLET ORAL at 06:58

## 2018-01-01 RX ADMIN — SENNOSIDES AND DOCUSATE SODIUM 2 TABLET: 8.6; 5 TABLET ORAL at 09:06

## 2018-01-01 RX ADMIN — SENNOSIDES AND DOCUSATE SODIUM 2 TABLET: 8.6; 5 TABLET ORAL at 09:53

## 2018-01-01 RX ADMIN — MEMANTINE HYDROCHLORIDE 10 MG: 10 TABLET ORAL at 09:32

## 2018-01-01 RX ADMIN — PETROLATUM: 42 OINTMENT TOPICAL at 13:04

## 2018-01-01 RX ADMIN — POLYETHYLENE GLYCOL (3350) 17 G: 17 POWDER, FOR SOLUTION ORAL at 13:25

## 2018-01-01 RX ADMIN — VITAMIN D, TAB 1000IU (100/BT) 2000 UNITS: 25 TAB at 21:36

## 2018-01-01 RX ADMIN — TAMSULOSIN HYDROCHLORIDE 0.4 MG: 0.4 CAPSULE ORAL at 08:58

## 2018-01-01 RX ADMIN — TAZOBACTAM SODIUM AND PIPERACILLIN SODIUM 3.38 G: 375; 3 INJECTION, SOLUTION INTRAVENOUS at 13:25

## 2018-01-01 RX ADMIN — VITAMIN D, TAB 1000IU (100/BT) 2000 UNITS: 25 TAB at 21:45

## 2018-01-01 RX ADMIN — DIVALPROEX SODIUM 250 MG: 250 TABLET, FILM COATED, EXTENDED RELEASE ORAL at 21:18

## 2018-01-01 RX ADMIN — LACTULOSE 10 G: 20 SOLUTION ORAL at 09:36

## 2018-01-01 RX ADMIN — POLYETHYLENE GLYCOL 3350 17 G: 17 POWDER, FOR SOLUTION ORAL at 09:02

## 2018-01-01 RX ADMIN — ASPIRIN 81 MG CHEWABLE TABLET 81 MG: 81 TABLET CHEWABLE at 09:36

## 2018-01-01 RX ADMIN — LEVOTHYROXINE SODIUM 50 MCG: 50 TABLET ORAL at 06:36

## 2018-01-01 RX ADMIN — ASPIRIN 81 MG: 81 TABLET, CHEWABLE ORAL at 08:37

## 2018-01-01 RX ADMIN — LACTULOSE 10 G: 20 SOLUTION ORAL at 21:26

## 2018-01-01 RX ADMIN — ISOSORBIDE MONONITRATE 30 MG: 60 TABLET, EXTENDED RELEASE ORAL at 10:34

## 2018-01-01 RX ADMIN — MEMANTINE HYDROCHLORIDE 10 MG: 10 TABLET ORAL at 09:57

## 2018-01-01 RX ADMIN — POLYETHYLENE GLYCOL 3350 17 G: 17 POWDER, FOR SOLUTION ORAL at 10:23

## 2018-01-01 RX ADMIN — AMOXICILLIN AND CLAVULANATE POTASSIUM 400 MG: 400; 57 POWDER, FOR SUSPENSION ORAL at 20:39

## 2018-01-01 RX ADMIN — MICONAZOLE NITRATE: 20 POWDER TOPICAL at 22:14

## 2018-01-01 RX ADMIN — VITAMIN D, TAB 1000IU (100/BT) 2000 UNITS: 25 TAB at 20:46

## 2018-01-01 RX ADMIN — VITAMIN D, TAB 1000IU (100/BT) 2000 UNITS: 25 TAB at 21:31

## 2018-01-01 RX ADMIN — MICONAZOLE NITRATE: 20 POWDER TOPICAL at 20:10

## 2018-01-01 RX ADMIN — DONEPEZIL HYDROCHLORIDE 20 MG: 5 TABLET ORAL at 20:49

## 2018-01-01 RX ADMIN — MEMANTINE HYDROCHLORIDE 10 MG: 10 TABLET, FILM COATED ORAL at 08:35

## 2018-01-01 RX ADMIN — POTASSIUM CHLORIDE AND SODIUM CHLORIDE 100 ML/HR: 900; 150 INJECTION, SOLUTION INTRAVENOUS at 06:47

## 2018-01-01 RX ADMIN — MEMANTINE HYDROCHLORIDE 10 MG: 10 TABLET ORAL at 21:18

## 2018-01-01 RX ADMIN — AMPICILLIN 500 MG: 500 CAPSULE ORAL at 17:39

## 2018-01-01 RX ADMIN — VITAMIN D, TAB 1000IU (100/BT) 2000 UNITS: 25 TAB at 20:38

## 2018-01-01 RX ADMIN — SENNOSIDES AND DOCUSATE SODIUM 2 TABLET: 8.6; 5 TABLET ORAL at 08:33

## 2018-01-01 RX ADMIN — SENNOSIDES AND DOCUSATE SODIUM 2 TABLET: 8.6; 5 TABLET ORAL at 21:45

## 2018-01-01 RX ADMIN — ASPIRIN 81 MG CHEWABLE TABLET 81 MG: 81 TABLET CHEWABLE at 09:20

## 2018-01-01 RX ADMIN — METOPROLOL TARTRATE 12.5 MG: 25 TABLET, FILM COATED ORAL at 20:39

## 2018-01-01 RX ADMIN — VITAMIN D, TAB 1000IU (100/BT) 2000 UNITS: 25 TAB at 10:45

## 2018-01-01 RX ADMIN — LACTULOSE 10 G: 20 SOLUTION ORAL at 09:32

## 2018-01-01 RX ADMIN — IPRATROPIUM BROMIDE AND ALBUTEROL SULFATE 3 ML: .5; 3 SOLUTION RESPIRATORY (INHALATION) at 06:19

## 2018-01-01 RX ADMIN — SENNOSIDES AND DOCUSATE SODIUM 2 TABLET: 8.6; 5 TABLET ORAL at 21:09

## 2018-01-01 RX ADMIN — POLYETHYLENE GLYCOL 3350 17 G: 17 POWDER, FOR SOLUTION ORAL at 08:38

## 2018-01-01 RX ADMIN — MEMANTINE HYDROCHLORIDE 10 MG: 10 TABLET ORAL at 08:57

## 2018-01-01 RX ADMIN — ACETAMINOPHEN 650 MG: 325 TABLET, FILM COATED ORAL at 20:32

## 2018-01-01 RX ADMIN — LEVOTHYROXINE SODIUM 50 MCG: 50 TABLET ORAL at 05:39

## 2018-01-01 RX ADMIN — AMPICILLIN 500 MG: 500 CAPSULE ORAL at 06:19

## 2018-01-01 RX ADMIN — SENNOSIDES AND DOCUSATE SODIUM 2 TABLET: 8.6; 5 TABLET ORAL at 09:31

## 2018-01-01 RX ADMIN — TAMSULOSIN HYDROCHLORIDE 0.4 MG: 0.4 CAPSULE ORAL at 08:12

## 2018-01-01 RX ADMIN — MICONAZOLE NITRATE: 20 POWDER TOPICAL at 08:44

## 2018-01-01 RX ADMIN — DIVALPROEX SODIUM 250 MG: 250 TABLET, FILM COATED, EXTENDED RELEASE ORAL at 20:44

## 2018-01-01 RX ADMIN — LACTULOSE 10 G: 20 SOLUTION ORAL at 10:07

## 2018-01-01 RX ADMIN — LEVOTHYROXINE SODIUM 50 MCG: 50 TABLET ORAL at 06:29

## 2018-01-01 RX ADMIN — VITAMIN D, TAB 1000IU (100/BT) 2000 UNITS: 25 TAB at 10:10

## 2018-01-01 RX ADMIN — VITAMIN D, TAB 1000IU (100/BT) 2000 UNITS: 25 TAB at 20:05

## 2018-01-01 RX ADMIN — LEVOTHYROXINE SODIUM 50 MCG: 50 TABLET ORAL at 05:52

## 2018-01-01 RX ADMIN — MICONAZOLE NITRATE: 20 POWDER TOPICAL at 21:16

## 2018-01-01 RX ADMIN — TAZOBACTAM SODIUM AND PIPERACILLIN SODIUM 3.38 G: 375; 3 INJECTION, SOLUTION INTRAVENOUS at 04:55

## 2018-01-01 RX ADMIN — VITAMIN D, TAB 1000IU (100/BT) 2000 UNITS: 25 TAB at 10:08

## 2018-01-01 RX ADMIN — DONEPEZIL HYDROCHLORIDE 20 MG: 5 TABLET ORAL at 20:14

## 2018-01-01 RX ADMIN — VITAMIN D, TAB 1000IU (100/BT) 2000 UNITS: 25 TAB at 21:42

## 2018-01-01 RX ADMIN — ACETAMINOPHEN 650 MG: 325 TABLET, FILM COATED ORAL at 20:15

## 2018-01-01 RX ADMIN — ACETAMINOPHEN 650 MG: 325 TABLET, FILM COATED ORAL at 20:49

## 2018-01-01 RX ADMIN — AMPICILLIN 500 MG: 500 CAPSULE ORAL at 06:32

## 2018-01-01 RX ADMIN — TAMSULOSIN HYDROCHLORIDE 0.4 MG: 0.4 CAPSULE ORAL at 09:35

## 2018-01-01 RX ADMIN — SENNOSIDES AND DOCUSATE SODIUM 2 TABLET: 8.6; 5 TABLET ORAL at 08:34

## 2018-01-01 RX ADMIN — VITAMIN D, TAB 1000IU (100/BT) 2000 UNITS: 25 TAB at 08:32

## 2018-01-01 RX ADMIN — POTASSIUM CHLORIDE AND SODIUM CHLORIDE 100 ML/HR: 900; 150 INJECTION, SOLUTION INTRAVENOUS at 19:52

## 2018-01-01 RX ADMIN — POLYETHYLENE GLYCOL 3350 17 G: 17 POWDER, FOR SOLUTION ORAL at 08:51

## 2018-01-01 RX ADMIN — LACTULOSE 10 G: 20 SOLUTION ORAL at 00:08

## 2018-01-01 RX ADMIN — DONEPEZIL HYDROCHLORIDE 20 MG: 5 TABLET ORAL at 21:20

## 2018-01-01 RX ADMIN — VITAMIN D, TAB 1000IU (100/BT) 2000 UNITS: 25 TAB at 20:39

## 2018-01-01 RX ADMIN — POLYETHYLENE GLYCOL 3350 17 G: 17 POWDER, FOR SOLUTION ORAL at 08:56

## 2018-01-01 RX ADMIN — LACTULOSE 10 G: 20 SOLUTION ORAL at 21:18

## 2018-01-01 RX ADMIN — LACTULOSE 10 G: 20 SOLUTION ORAL at 08:18

## 2018-01-01 RX ADMIN — TAMSULOSIN HYDROCHLORIDE 0.4 MG: 0.4 CAPSULE ORAL at 08:59

## 2018-01-01 RX ADMIN — TAMSULOSIN HYDROCHLORIDE 0.4 MG: 0.4 CAPSULE ORAL at 08:07

## 2018-01-01 RX ADMIN — ISOSORBIDE MONONITRATE 30 MG: 60 TABLET, EXTENDED RELEASE ORAL at 08:31

## 2018-01-01 RX ADMIN — AMPICILLIN 500 MG: 500 CAPSULE ORAL at 15:10

## 2018-01-01 RX ADMIN — LEVOTHYROXINE SODIUM 50 MCG: 50 TABLET ORAL at 06:21

## 2018-01-01 RX ADMIN — ISOSORBIDE MONONITRATE 30 MG: 60 TABLET, EXTENDED RELEASE ORAL at 10:45

## 2018-01-01 RX ADMIN — MEMANTINE HYDROCHLORIDE 10 MG: 10 TABLET ORAL at 09:53

## 2018-01-01 RX ADMIN — MEMANTINE HYDROCHLORIDE 10 MG: 10 TABLET ORAL at 21:00

## 2018-01-01 RX ADMIN — GADOBENATE DIMEGLUMINE 15 ML: 529 INJECTION, SOLUTION INTRAVENOUS at 16:16

## 2018-01-01 RX ADMIN — MICONAZOLE NITRATE: 20 POWDER TOPICAL at 20:19

## 2018-01-01 RX ADMIN — LEVOTHYROXINE SODIUM 50 MCG: 50 TABLET ORAL at 06:39

## 2018-01-01 RX ADMIN — SENNOSIDES AND DOCUSATE SODIUM 2 TABLET: 8.6; 5 TABLET ORAL at 09:02

## 2018-01-01 RX ADMIN — LEVOTHYROXINE SODIUM 50 MCG: 50 TABLET ORAL at 05:07

## 2018-01-01 RX ADMIN — LACTULOSE 10 G: 20 SOLUTION ORAL at 08:46

## 2018-01-01 RX ADMIN — POLYETHYLENE GLYCOL (3350) 17 G: 17 POWDER, FOR SOLUTION ORAL at 09:02

## 2018-01-01 RX ADMIN — VITAMIN D, TAB 1000IU (100/BT) 2000 UNITS: 25 TAB at 10:00

## 2018-01-01 RX ADMIN — LACTULOSE 10 G: 20 SOLUTION ORAL at 20:06

## 2018-01-01 RX ADMIN — VITAMIN D, TAB 1000IU (100/BT) 2000 UNITS: 25 TAB at 08:40

## 2018-01-01 RX ADMIN — IPRATROPIUM BROMIDE AND ALBUTEROL SULFATE 3 ML: .5; 3 SOLUTION RESPIRATORY (INHALATION) at 06:38

## 2018-01-01 RX ADMIN — PETROLATUM: 42 OINTMENT TOPICAL at 20:06

## 2018-01-01 RX ADMIN — IPRATROPIUM BROMIDE AND ALBUTEROL SULFATE 3 ML: .5; 3 SOLUTION RESPIRATORY (INHALATION) at 15:03

## 2018-01-01 RX ADMIN — SENNOSIDES AND DOCUSATE SODIUM 2 TABLET: 8.6; 5 TABLET ORAL at 10:44

## 2018-01-01 RX ADMIN — DIVALPROEX SODIUM 250 MG: 250 TABLET, FILM COATED, EXTENDED RELEASE ORAL at 20:08

## 2018-01-01 RX ADMIN — LEVOTHYROXINE SODIUM 50 MCG: 50 TABLET ORAL at 05:43

## 2018-01-01 RX ADMIN — POLYETHYLENE GLYCOL 3350 17 G: 17 POWDER, FOR SOLUTION ORAL at 08:06

## 2018-01-01 RX ADMIN — LACTULOSE 10 G: 20 SOLUTION ORAL at 08:47

## 2018-01-01 RX ADMIN — LACTULOSE 10 G: 20 SOLUTION ORAL at 21:09

## 2018-01-01 RX ADMIN — LEVOTHYROXINE SODIUM 50 MCG: 50 TABLET ORAL at 06:47

## 2018-01-01 RX ADMIN — GLYCOPYRROLATE 0.2 MG: 0.2 INJECTION INTRAMUSCULAR; INTRAVENOUS at 10:02

## 2018-01-01 RX ADMIN — LACTULOSE 10 G: 20 SOLUTION ORAL at 20:35

## 2018-01-01 RX ADMIN — VITAMIN D, TAB 1000IU (100/BT) 2000 UNITS: 25 TAB at 10:34

## 2018-01-01 RX ADMIN — ACETAMINOPHEN 650 MG: 325 SOLUTION ORAL at 21:18

## 2018-01-01 RX ADMIN — IPRATROPIUM BROMIDE AND ALBUTEROL SULFATE 3 ML: .5; 3 SOLUTION RESPIRATORY (INHALATION) at 16:20

## 2018-01-01 RX ADMIN — LACTULOSE 10 G: 20 SOLUTION ORAL at 09:57

## 2018-01-01 RX ADMIN — LACTULOSE 10 G: 20 SOLUTION ORAL at 08:51

## 2018-01-01 RX ADMIN — MICONAZOLE NITRATE: 20 POWDER TOPICAL at 09:57

## 2018-01-01 RX ADMIN — MEMANTINE HYDROCHLORIDE 10 MG: 10 TABLET ORAL at 20:59

## 2018-01-01 RX ADMIN — ISOSORBIDE MONONITRATE 30 MG: 60 TABLET, EXTENDED RELEASE ORAL at 08:28

## 2018-01-01 RX ADMIN — MEMANTINE HYDROCHLORIDE 10 MG: 10 TABLET ORAL at 21:20

## 2018-01-01 RX ADMIN — ISOSORBIDE MONONITRATE 30 MG: 60 TABLET, EXTENDED RELEASE ORAL at 09:59

## 2018-01-01 RX ADMIN — AMPICILLIN 500 MG: 500 CAPSULE ORAL at 15:03

## 2018-01-01 RX ADMIN — MEMANTINE HYDROCHLORIDE 10 MG: 10 TABLET ORAL at 08:26

## 2018-01-01 RX ADMIN — SENNOSIDES AND DOCUSATE SODIUM 2 TABLET: 8.6; 5 TABLET ORAL at 09:17

## 2018-01-01 RX ADMIN — SENNOSIDES AND DOCUSATE SODIUM 2 TABLET: 8.6; 5 TABLET ORAL at 09:35

## 2018-01-01 RX ADMIN — MICONAZOLE NITRATE: 20 POWDER TOPICAL at 09:20

## 2018-01-01 RX ADMIN — TAZOBACTAM SODIUM AND PIPERACILLIN SODIUM 3.38 G: 375; 3 INJECTION, SOLUTION INTRAVENOUS at 06:12

## 2018-01-01 RX ADMIN — LACTULOSE 10 G: 20 SOLUTION ORAL at 20:21

## 2018-01-01 RX ADMIN — LACTULOSE 10 G: 20 SOLUTION ORAL at 21:42

## 2018-01-01 RX ADMIN — LEVOTHYROXINE SODIUM 50 MCG: 50 TABLET ORAL at 05:04

## 2018-01-01 RX ADMIN — SENNOSIDES AND DOCUSATE SODIUM 2 TABLET: 8.6; 5 TABLET ORAL at 19:54

## 2018-01-01 RX ADMIN — POLYETHYLENE GLYCOL 3350 17 G: 17 POWDER, FOR SOLUTION ORAL at 10:03

## 2018-01-01 RX ADMIN — TAMSULOSIN HYDROCHLORIDE 0.4 MG: 0.4 CAPSULE ORAL at 09:03

## 2018-01-01 RX ADMIN — POLYETHYLENE GLYCOL 3350 17 G: 17 POWDER, FOR SOLUTION ORAL at 09:19

## 2018-01-01 RX ADMIN — SENNOSIDES AND DOCUSATE SODIUM 2 TABLET: 8.6; 5 TABLET ORAL at 20:32

## 2018-01-01 RX ADMIN — SENNOSIDES AND DOCUSATE SODIUM 2 TABLET: 8.6; 5 TABLET ORAL at 20:44

## 2018-01-01 RX ADMIN — VITAMIN D, TAB 1000IU (100/BT) 2000 UNITS: 25 TAB at 09:41

## 2018-01-01 RX ADMIN — VITAMIN D, TAB 1000IU (100/BT) 2000 UNITS: 25 TAB at 20:59

## 2018-01-01 RX ADMIN — POLYETHYLENE GLYCOL 3350 17 G: 17 POWDER, FOR SOLUTION ORAL at 10:08

## 2018-01-01 RX ADMIN — TAMSULOSIN HYDROCHLORIDE 0.4 MG: 0.4 CAPSULE ORAL at 08:26

## 2018-01-01 RX ADMIN — VITAMIN D, TAB 1000IU (100/BT) 2000 UNITS: 25 TAB at 09:59

## 2018-01-01 RX ADMIN — LACTULOSE 10 G: 20 SOLUTION ORAL at 20:16

## 2018-01-01 RX ADMIN — SENNOSIDES AND DOCUSATE SODIUM 2 TABLET: 8.6; 5 TABLET ORAL at 09:55

## 2018-01-01 RX ADMIN — PETROLATUM: 42 OINTMENT TOPICAL at 20:40

## 2018-01-01 RX ADMIN — ISOSORBIDE MONONITRATE 30 MG: 30 TABLET ORAL at 08:00

## 2018-01-01 RX ADMIN — MEMANTINE HYDROCHLORIDE 10 MG: 10 TABLET ORAL at 09:31

## 2018-01-01 RX ADMIN — MICONAZOLE NITRATE: 20 POWDER TOPICAL at 09:52

## 2018-01-01 RX ADMIN — ASPIRIN 81 MG CHEWABLE TABLET 81 MG: 81 TABLET CHEWABLE at 08:55

## 2018-01-01 RX ADMIN — RISPERIDONE 1 MG: 1 TABLET ORAL at 20:45

## 2018-01-01 RX ADMIN — POLYETHYLENE GLYCOL 3350 17 G: 17 POWDER, FOR SOLUTION ORAL at 08:58

## 2018-01-01 RX ADMIN — SENNOSIDES AND DOCUSATE SODIUM 2 TABLET: 8.6; 5 TABLET ORAL at 08:37

## 2018-01-01 RX ADMIN — MEMANTINE HYDROCHLORIDE 10 MG: 10 TABLET ORAL at 21:55

## 2018-01-01 RX ADMIN — ASPIRIN 81 MG CHEWABLE TABLET 81 MG: 81 TABLET CHEWABLE at 10:15

## 2018-01-01 RX ADMIN — ASPIRIN 81 MG CHEWABLE TABLET 81 MG: 81 TABLET CHEWABLE at 09:57

## 2018-01-01 RX ADMIN — ASPIRIN 81 MG CHEWABLE TABLET 81 MG: 81 TABLET CHEWABLE at 09:07

## 2018-01-01 RX ADMIN — PETROLATUM: 42 OINTMENT TOPICAL at 10:22

## 2018-01-01 RX ADMIN — ASPIRIN 81 MG CHEWABLE TABLET 81 MG: 81 TABLET CHEWABLE at 09:02

## 2018-01-01 RX ADMIN — MEMANTINE HYDROCHLORIDE 10 MG: 10 TABLET ORAL at 08:47

## 2018-01-01 RX ADMIN — VITAMIN D, TAB 1000IU (100/BT) 2000 UNITS: 25 TAB at 21:30

## 2018-01-01 RX ADMIN — MEMANTINE HYDROCHLORIDE 10 MG: 5 TABLET ORAL at 23:21

## 2018-01-01 RX ADMIN — MICONAZOLE NITRATE: 20 POWDER TOPICAL at 11:15

## 2018-01-01 RX ADMIN — TAMSULOSIN HYDROCHLORIDE 0.4 MG: 0.4 CAPSULE ORAL at 08:34

## 2018-01-01 RX ADMIN — DOCUSATE SODIUM -SENNOSIDES 2 TABLET: 50; 8.6 TABLET, COATED ORAL at 21:08

## 2018-01-01 RX ADMIN — VITAMIN D, TAB 1000IU (100/BT) 2000 UNITS: 25 TAB at 08:59

## 2018-01-01 RX ADMIN — TAMSULOSIN HYDROCHLORIDE 0.4 MG: 0.4 CAPSULE ORAL at 08:00

## 2018-01-01 RX ADMIN — DIVALPROEX SODIUM 250 MG: 250 TABLET, FILM COATED, EXTENDED RELEASE ORAL at 20:32

## 2018-01-01 RX ADMIN — VITAMIN D, TAB 1000IU (100/BT) 2000 UNITS: 25 TAB at 21:26

## 2018-01-01 RX ADMIN — MICONAZOLE NITRATE: 20 POWDER TOPICAL at 21:23

## 2018-01-01 RX ADMIN — SENNOSIDES AND DOCUSATE SODIUM 2 TABLET: 8.6; 5 TABLET ORAL at 20:16

## 2018-01-01 RX ADMIN — IPRATROPIUM BROMIDE AND ALBUTEROL SULFATE 3 ML: .5; 3 SOLUTION RESPIRATORY (INHALATION) at 06:12

## 2018-01-01 RX ADMIN — ENOXAPARIN SODIUM 40 MG: 40 INJECTION SUBCUTANEOUS at 22:07

## 2018-01-01 RX ADMIN — MEMANTINE HYDROCHLORIDE 10 MG: 10 TABLET ORAL at 20:31

## 2018-01-01 RX ADMIN — LEVOTHYROXINE SODIUM 50 MCG: 50 TABLET ORAL at 06:27

## 2018-01-01 RX ADMIN — TAMSULOSIN HYDROCHLORIDE 0.4 MG: 0.4 CAPSULE ORAL at 09:11

## 2018-01-01 RX ADMIN — ENOXAPARIN SODIUM 40 MG: 40 INJECTION SUBCUTANEOUS at 20:57

## 2018-01-01 RX ADMIN — VITAMIN D, TAB 1000IU (100/BT) 2000 UNITS: 25 TAB at 20:08

## 2018-01-01 RX ADMIN — MICONAZOLE NITRATE: 20 POWDER TOPICAL at 22:12

## 2018-01-01 RX ADMIN — VITAMIN D, TAB 1000IU (100/BT) 2000 UNITS: 25 TAB at 20:44

## 2018-01-01 RX ADMIN — LACTULOSE 10 G: 20 SOLUTION ORAL at 20:32

## 2018-01-01 RX ADMIN — LEVOTHYROXINE SODIUM 50 MCG: 50 TABLET ORAL at 05:20

## 2018-01-01 RX ADMIN — LACTULOSE 10 G: 20 SOLUTION ORAL at 21:17

## 2018-01-01 RX ADMIN — LACTULOSE 10 G: 20 SOLUTION ORAL at 22:14

## 2018-01-01 RX ADMIN — TAMSULOSIN HYDROCHLORIDE 0.4 MG: 0.4 CAPSULE ORAL at 09:51

## 2018-01-01 RX ADMIN — LACTULOSE 10 G: 20 SOLUTION ORAL at 20:53

## 2018-01-01 RX ADMIN — LEVOTHYROXINE SODIUM 50 MCG: 50 TABLET ORAL at 06:03

## 2018-01-01 RX ADMIN — LACTULOSE 10 G: 20 SOLUTION ORAL at 21:20

## 2018-01-01 RX ADMIN — LACTULOSE 10 G: 20 SOLUTION ORAL at 08:59

## 2018-01-01 RX ADMIN — MICONAZOLE NITRATE: 20 POWDER TOPICAL at 20:25

## 2018-01-01 RX ADMIN — DIVALPROEX SODIUM 250 MG: 250 TABLET, FILM COATED, EXTENDED RELEASE ORAL at 20:35

## 2018-01-01 RX ADMIN — DOCUSATE SODIUM AND SENNOSIDES 2 TABLET: 8.6; 5 TABLET, FILM COATED ORAL at 23:30

## 2018-01-01 RX ADMIN — ISOSORBIDE MONONITRATE 30 MG: 60 TABLET, EXTENDED RELEASE ORAL at 08:26

## 2018-01-01 RX ADMIN — VITAMIN D, TAB 1000IU (100/BT) 2000 UNITS: 25 TAB at 09:51

## 2018-01-01 RX ADMIN — IPRATROPIUM BROMIDE AND ALBUTEROL SULFATE 3 ML: .5; 3 SOLUTION RESPIRATORY (INHALATION) at 16:09

## 2018-01-01 RX ADMIN — VITAMIN D, TAB 1000IU (100/BT) 2000 UNITS: 25 TAB at 20:41

## 2018-01-01 RX ADMIN — PETROLATUM: 42 OINTMENT TOPICAL at 08:32

## 2018-01-01 RX ADMIN — TAZOBACTAM SODIUM AND PIPERACILLIN SODIUM 3.38 G: 375; 3 INJECTION, SOLUTION INTRAVENOUS at 21:41

## 2018-01-01 RX ADMIN — SENNOSIDES AND DOCUSATE SODIUM 2 TABLET: 8.6; 5 TABLET ORAL at 08:42

## 2018-01-01 RX ADMIN — METOPROLOL TARTRATE 12.5 MG: 25 TABLET, FILM COATED ORAL at 09:31

## 2018-01-01 RX ADMIN — MODAFINIL 100 MG: 100 TABLET ORAL at 08:53

## 2018-01-01 RX ADMIN — LACTULOSE 10 G: 20 SOLUTION ORAL at 20:31

## 2018-01-01 RX ADMIN — SENNOSIDES AND DOCUSATE SODIUM 2 TABLET: 8.6; 5 TABLET ORAL at 08:25

## 2018-01-01 RX ADMIN — PETROLATUM: 42 OINTMENT TOPICAL at 20:55

## 2018-01-01 RX ADMIN — TAMSULOSIN HYDROCHLORIDE 0.4 MG: 0.4 CAPSULE ORAL at 08:48

## 2018-01-01 RX ADMIN — IPRATROPIUM BROMIDE AND ALBUTEROL SULFATE 3 ML: .5; 3 SOLUTION RESPIRATORY (INHALATION) at 00:05

## 2018-01-01 RX ADMIN — VITAMIN D, TAB 1000IU (100/BT) 2000 UNITS: 25 TAB at 11:41

## 2018-01-01 RX ADMIN — LACTULOSE 10 G: 20 SOLUTION ORAL at 20:56

## 2018-01-01 RX ADMIN — TAZOBACTAM SODIUM AND PIPERACILLIN SODIUM 3.38 G: 375; 3 INJECTION, SOLUTION INTRAVENOUS at 06:14

## 2018-01-01 RX ADMIN — POLYETHYLENE GLYCOL 3350 17 G: 17 POWDER, FOR SOLUTION ORAL at 08:42

## 2018-01-01 RX ADMIN — LACTULOSE 10 G: 20 SOLUTION ORAL at 20:55

## 2018-01-01 RX ADMIN — MEMANTINE HYDROCHLORIDE 10 MG: 10 TABLET ORAL at 20:20

## 2018-01-01 RX ADMIN — LACTULOSE 10 G: 20 SOLUTION ORAL at 08:55

## 2018-01-01 RX ADMIN — POLYETHYLENE GLYCOL 3350 17 G: 17 POWDER, FOR SOLUTION ORAL at 08:32

## 2018-01-01 RX ADMIN — ASPIRIN 81 MG CHEWABLE TABLET 81 MG: 81 TABLET CHEWABLE at 09:41

## 2018-01-01 RX ADMIN — MICONAZOLE NITRATE: 20 POWDER TOPICAL at 08:53

## 2018-01-01 RX ADMIN — VITAMIN D, TAB 1000IU (100/BT) 2000 UNITS: 25 TAB at 21:17

## 2018-01-01 RX ADMIN — LEVOTHYROXINE SODIUM 50 MCG: 50 TABLET ORAL at 07:55

## 2018-01-01 RX ADMIN — VITAMIN D, TAB 1000IU (100/BT) 2000 UNITS: 25 TAB at 08:39

## 2018-01-01 RX ADMIN — POLYETHYLENE GLYCOL 3350 17 G: 17 POWDER, FOR SOLUTION ORAL at 08:35

## 2018-01-01 RX ADMIN — MODAFINIL 100 MG: 100 TABLET ORAL at 10:09

## 2018-01-01 RX ADMIN — ENOXAPARIN SODIUM 40 MG: 40 INJECTION SUBCUTANEOUS at 20:20

## 2018-01-01 RX ADMIN — LACTULOSE 10 G: 20 SOLUTION ORAL at 22:39

## 2018-01-01 RX ADMIN — VITAMIN D, TAB 1000IU (100/BT) 2000 UNITS: 25 TAB at 08:06

## 2018-01-01 RX ADMIN — VITAMIN D, TAB 1000IU (100/BT) 2000 UNITS: 25 TAB at 20:14

## 2018-01-01 RX ADMIN — IPRATROPIUM BROMIDE AND ALBUTEROL SULFATE 3 ML: .5; 3 SOLUTION RESPIRATORY (INHALATION) at 16:47

## 2018-01-01 RX ADMIN — ACETAMINOPHEN 650 MG: 650 SUPPOSITORY RECTAL at 14:38

## 2018-01-01 RX ADMIN — LEVOTHYROXINE SODIUM 50 MCG: 50 TABLET ORAL at 06:32

## 2018-01-01 RX ADMIN — ASPIRIN 81 MG CHEWABLE TABLET 81 MG: 81 TABLET CHEWABLE at 10:34

## 2018-01-01 RX ADMIN — LACTULOSE 10 G: 20 SOLUTION ORAL at 09:07

## 2018-01-01 RX ADMIN — VITAMIN D, TAB 1000IU (100/BT) 2000 UNITS: 25 TAB at 08:26

## 2018-01-01 RX ADMIN — MICONAZOLE NITRATE: 20 POWDER TOPICAL at 08:24

## 2018-01-01 RX ADMIN — VITAMIN D, TAB 1000IU (100/BT) 2000 UNITS: 25 TAB at 09:36

## 2018-01-01 RX ADMIN — MEMANTINE HYDROCHLORIDE 10 MG: 10 TABLET ORAL at 09:45

## 2018-01-01 RX ADMIN — DONEPEZIL HYDROCHLORIDE 20 MG: 5 TABLET ORAL at 21:10

## 2018-01-01 RX ADMIN — VITAMIN D, TAB 1000IU (100/BT) 2000 UNITS: 25 TAB at 21:03

## 2018-01-01 RX ADMIN — Medication 300 ML: at 15:29

## 2018-01-01 RX ADMIN — MEMANTINE HYDROCHLORIDE 10 MG: 10 TABLET ORAL at 09:18

## 2018-01-01 RX ADMIN — MODAFINIL 100 MG: 100 TABLET ORAL at 14:11

## 2018-01-01 RX ADMIN — MEMANTINE HYDROCHLORIDE 10 MG: 10 TABLET ORAL at 21:36

## 2018-01-01 RX ADMIN — MICONAZOLE NITRATE: 20 POWDER TOPICAL at 20:31

## 2018-01-01 RX ADMIN — ISOSORBIDE MONONITRATE 30 MG: 60 TABLET, EXTENDED RELEASE ORAL at 08:52

## 2018-01-01 RX ADMIN — VITAMIN D, TAB 1000IU (100/BT) 2000 UNITS: 25 TAB at 08:55

## 2018-01-01 RX ADMIN — ENOXAPARIN SODIUM 40 MG: 40 INJECTION SUBCUTANEOUS at 21:22

## 2018-01-01 RX ADMIN — MICONAZOLE NITRATE: 20 POWDER TOPICAL at 20:20

## 2018-01-01 RX ADMIN — SENNOSIDES AND DOCUSATE SODIUM 2 TABLET: 8.6; 5 TABLET ORAL at 22:13

## 2018-01-01 RX ADMIN — ASPIRIN 81 MG CHEWABLE TABLET 81 MG: 81 TABLET CHEWABLE at 09:53

## 2018-01-01 RX ADMIN — LEVOTHYROXINE SODIUM 50 MCG: 50 TABLET ORAL at 05:45

## 2018-01-01 RX ADMIN — SENNOSIDES AND DOCUSATE SODIUM 2 TABLET: 8.6; 5 TABLET ORAL at 08:19

## 2018-01-01 RX ADMIN — MICONAZOLE NITRATE: 20 POWDER TOPICAL at 08:39

## 2018-01-01 RX ADMIN — SENNOSIDES AND DOCUSATE SODIUM 2 TABLET: 8.6; 5 TABLET ORAL at 20:35

## 2018-01-01 RX ADMIN — VITAMIN D, TAB 1000IU (100/BT) 2000 UNITS: 25 TAB at 20:09

## 2018-01-01 RX ADMIN — VITAMIN D, TAB 1000IU (100/BT) 2000 UNITS: 25 TAB at 00:09

## 2018-01-01 RX ADMIN — SENNOSIDES AND DOCUSATE SODIUM 2 TABLET: 8.6; 5 TABLET ORAL at 20:23

## 2018-01-01 RX ADMIN — ACETAMINOPHEN 650 MG: 325 TABLET, FILM COATED ORAL at 20:20

## 2018-01-01 RX ADMIN — VITAMIN D, TAB 1000IU (100/BT) 2000 UNITS: 25 TAB at 20:17

## 2018-01-01 RX ADMIN — IPRATROPIUM BROMIDE AND ALBUTEROL SULFATE 3 ML: .5; 3 SOLUTION RESPIRATORY (INHALATION) at 08:06

## 2018-01-01 RX ADMIN — SENNOSIDES AND DOCUSATE SODIUM 2 TABLET: 8.6; 5 TABLET ORAL at 20:45

## 2018-01-01 RX ADMIN — MICONAZOLE NITRATE: 20 POWDER TOPICAL at 20:41

## 2018-01-01 RX ADMIN — ISOSORBIDE MONONITRATE 30 MG: 30 TABLET ORAL at 09:02

## 2018-01-01 RX ADMIN — SENNOSIDES AND DOCUSATE SODIUM 2 TABLET: 8.6; 5 TABLET ORAL at 20:14

## 2018-01-01 RX ADMIN — TAMSULOSIN HYDROCHLORIDE 0.4 MG: 0.4 CAPSULE ORAL at 09:37

## 2018-01-01 RX ADMIN — DIVALPROEX SODIUM 250 MG: 250 TABLET, FILM COATED, EXTENDED RELEASE ORAL at 20:34

## 2018-01-01 RX ADMIN — VITAMIN D, TAB 1000IU (100/BT) 2000 UNITS: 25 TAB at 09:44

## 2018-01-01 RX ADMIN — TAMSULOSIN HYDROCHLORIDE 0.4 MG: 0.4 CAPSULE ORAL at 08:42

## 2018-01-01 RX ADMIN — SENNOSIDES AND DOCUSATE SODIUM 2 TABLET: 8.6; 5 TABLET ORAL at 21:44

## 2018-01-01 RX ADMIN — TAMSULOSIN HYDROCHLORIDE 0.4 MG: 0.4 CAPSULE ORAL at 08:29

## 2018-01-01 RX ADMIN — MEMANTINE HYDROCHLORIDE 10 MG: 10 TABLET, FILM COATED ORAL at 08:39

## 2018-01-01 RX ADMIN — DONEPEZIL HYDROCHLORIDE 20 MG: 5 TABLET ORAL at 20:10

## 2018-01-01 RX ADMIN — ASPIRIN 81 MG CHEWABLE TABLET 81 MG: 81 TABLET CHEWABLE at 08:31

## 2018-01-01 RX ADMIN — LACTULOSE 10 G: 20 SOLUTION ORAL at 20:49

## 2018-01-01 RX ADMIN — ISOSORBIDE MONONITRATE 30 MG: 60 TABLET, EXTENDED RELEASE ORAL at 08:50

## 2018-01-01 RX ADMIN — LEVOTHYROXINE SODIUM 50 MCG: 50 TABLET ORAL at 05:03

## 2018-01-01 RX ADMIN — SENNOSIDES AND DOCUSATE SODIUM 2 TABLET: 8.6; 5 TABLET ORAL at 09:45

## 2018-01-01 RX ADMIN — MICONAZOLE NITRATE: 20 POWDER TOPICAL at 21:14

## 2018-01-01 RX ADMIN — ASPIRIN 81 MG: 81 TABLET, CHEWABLE ORAL at 11:41

## 2018-01-01 RX ADMIN — AMOXICILLIN AND CLAVULANATE POTASSIUM 400 MG: 400; 57 POWDER, FOR SUSPENSION ORAL at 10:16

## 2018-01-01 RX ADMIN — SENNOSIDES AND DOCUSATE SODIUM 2 TABLET: 8.6; 5 TABLET ORAL at 20:19

## 2018-01-01 RX ADMIN — AMPICILLIN 500 MG: 500 CAPSULE ORAL at 21:36

## 2018-01-01 RX ADMIN — TAZOBACTAM SODIUM AND PIPERACILLIN SODIUM 3.38 G: 375; 3 INJECTION, SOLUTION INTRAVENOUS at 21:19

## 2018-01-01 RX ADMIN — LACTULOSE 10 G: 20 SOLUTION ORAL at 09:03

## 2018-01-01 RX ADMIN — VITAMIN D, TAB 1000IU (100/BT) 2000 UNITS: 25 TAB at 09:25

## 2018-01-01 RX ADMIN — MICONAZOLE NITRATE: 20 POWDER TOPICAL at 21:27

## 2018-01-01 RX ADMIN — AMLODIPINE BESYLATE 2.5 MG: 2.5 TABLET ORAL at 08:00

## 2018-01-01 RX ADMIN — LACTULOSE 10 G: 20 SOLUTION ORAL at 21:45

## 2018-01-01 RX ADMIN — LACTULOSE 10 G: 20 SOLUTION ORAL at 22:42

## 2018-01-01 RX ADMIN — VITAMIN D, TAB 1000IU (100/BT) 2000 UNITS: 25 TAB at 08:46

## 2018-01-01 RX ADMIN — LACTULOSE 10 G: 20 SOLUTION ORAL at 09:55

## 2018-01-01 RX ADMIN — TAMSULOSIN HYDROCHLORIDE 0.4 MG: 0.4 CAPSULE ORAL at 09:31

## 2018-01-01 RX ADMIN — POLYETHYLENE GLYCOL 3350 17 G: 17 POWDER, FOR SOLUTION ORAL at 09:45

## 2018-01-01 RX ADMIN — PETROLATUM: 42 OINTMENT TOPICAL at 21:05

## 2018-01-01 RX ADMIN — IPRATROPIUM BROMIDE AND ALBUTEROL SULFATE 3 ML: .5; 3 SOLUTION RESPIRATORY (INHALATION) at 15:52

## 2018-01-01 RX ADMIN — POTASSIUM CHLORIDE AND SODIUM CHLORIDE 100 ML/HR: 900; 150 INJECTION, SOLUTION INTRAVENOUS at 21:18

## 2018-01-01 RX ADMIN — VITAMIN D, TAB 1000IU (100/BT) 2000 UNITS: 25 TAB at 09:00

## 2018-01-01 RX ADMIN — MICONAZOLE NITRATE: 20 POWDER TOPICAL at 09:12

## 2018-01-01 RX ADMIN — TAMSULOSIN HYDROCHLORIDE 0.4 MG: 0.4 CAPSULE ORAL at 09:53

## 2018-01-01 RX ADMIN — MICONAZOLE NITRATE: 20 POWDER TOPICAL at 10:00

## 2018-01-01 RX ADMIN — TAMSULOSIN HYDROCHLORIDE 0.4 MG: 0.4 CAPSULE ORAL at 09:36

## 2018-01-01 RX ADMIN — AMPICILLIN 500 MG: 500 CAPSULE ORAL at 21:06

## 2018-01-01 RX ADMIN — MODAFINIL 100 MG: 100 TABLET ORAL at 08:47

## 2018-01-01 RX ADMIN — MODAFINIL 100 MG: 100 TABLET ORAL at 08:46

## 2018-01-01 RX ADMIN — ASPIRIN 81 MG CHEWABLE TABLET 81 MG: 81 TABLET CHEWABLE at 08:41

## 2018-01-01 RX ADMIN — ACETAMINOPHEN 650 MG: 325 TABLET, FILM COATED ORAL at 21:34

## 2018-01-01 RX ADMIN — MODAFINIL 100 MG: 100 TABLET ORAL at 09:33

## 2018-01-01 RX ADMIN — MICONAZOLE NITRATE: 20 POWDER TOPICAL at 09:03

## 2018-01-01 RX ADMIN — DONEPEZIL HYDROCHLORIDE 20 MG: 5 TABLET ORAL at 21:44

## 2018-01-01 RX ADMIN — LEVOTHYROXINE SODIUM 50 MCG: 50 TABLET ORAL at 05:59

## 2018-01-01 RX ADMIN — MICONAZOLE NITRATE: 20 POWDER TOPICAL at 21:05

## 2018-01-01 RX ADMIN — MODAFINIL 100 MG: 100 TABLET ORAL at 10:39

## 2018-01-01 RX ADMIN — IPRATROPIUM BROMIDE AND ALBUTEROL SULFATE 3 ML: .5; 3 SOLUTION RESPIRATORY (INHALATION) at 16:39

## 2018-01-01 RX ADMIN — MICONAZOLE NITRATE: 20 POWDER TOPICAL at 10:04

## 2018-01-01 RX ADMIN — MICONAZOLE NITRATE: 20 POWDER TOPICAL at 20:24

## 2018-01-01 RX ADMIN — ENOXAPARIN SODIUM 40 MG: 40 INJECTION SUBCUTANEOUS at 02:45

## 2018-01-01 RX ADMIN — MICONAZOLE NITRATE: 20 POWDER TOPICAL at 13:06

## 2018-01-01 RX ADMIN — VITAMIN D, TAB 1000IU (100/BT) 2000 UNITS: 25 TAB at 08:42

## 2018-01-01 RX ADMIN — DOCUSATE SODIUM AND SENNOSIDES 2 TABLET: 8.6; 5 TABLET, FILM COATED ORAL at 20:49

## 2018-01-01 RX ADMIN — POLYETHYLENE GLYCOL 3350 17 G: 17 POWDER, FOR SOLUTION ORAL at 09:44

## 2018-01-01 RX ADMIN — PETROLATUM: 42 OINTMENT TOPICAL at 10:01

## 2018-01-01 RX ADMIN — MODAFINIL 100 MG: 100 TABLET ORAL at 10:34

## 2018-01-01 RX ADMIN — POLYETHYLENE GLYCOL 3350 17 G: 17 POWDER, FOR SOLUTION ORAL at 08:26

## 2018-01-01 RX ADMIN — ACETAMINOPHEN 650 MG: 325 TABLET, FILM COATED ORAL at 20:39

## 2018-01-01 RX ADMIN — TAMSULOSIN HYDROCHLORIDE 0.4 MG: 0.4 CAPSULE ORAL at 08:50

## 2018-01-01 RX ADMIN — PETROLATUM: 42 OINTMENT TOPICAL at 20:34

## 2018-01-01 RX ADMIN — MICONAZOLE NITRATE: 20 POWDER TOPICAL at 16:20

## 2018-01-01 RX ADMIN — POLYETHYLENE GLYCOL 3350 17 G: 17 POWDER, FOR SOLUTION ORAL at 09:20

## 2018-01-01 RX ADMIN — DONEPEZIL HYDROCHLORIDE 20 MG: 5 TABLET ORAL at 21:55

## 2018-01-01 RX ADMIN — VITAMIN D, TAB 1000IU (100/BT) 2000 UNITS: 25 TAB at 08:31

## 2018-01-01 RX ADMIN — AMPICILLIN 500 MG: 500 CAPSULE ORAL at 21:11

## 2018-01-01 RX ADMIN — MICONAZOLE NITRATE: 20 POWDER TOPICAL at 20:36

## 2018-01-01 RX ADMIN — ACETAMINOPHEN 650 MG: 325 TABLET, FILM COATED ORAL at 20:50

## 2018-01-01 RX ADMIN — LACTULOSE 10 G: 20 SOLUTION ORAL at 20:34

## 2018-01-01 RX ADMIN — SENNOSIDES AND DOCUSATE SODIUM 2 TABLET: 8.6; 5 TABLET ORAL at 20:37

## 2018-01-01 RX ADMIN — MODAFINIL 100 MG: 100 TABLET ORAL at 08:32

## 2018-01-01 RX ADMIN — POLYETHYLENE GLYCOL (3350) 17 G: 17 POWDER, FOR SOLUTION ORAL at 21:11

## 2018-01-01 RX ADMIN — IPRATROPIUM BROMIDE AND ALBUTEROL SULFATE 3 ML: .5; 3 SOLUTION RESPIRATORY (INHALATION) at 16:28

## 2018-01-01 RX ADMIN — LEVOTHYROXINE SODIUM 50 MCG: 50 TABLET ORAL at 06:24

## 2018-01-01 RX ADMIN — DIVALPROEX SODIUM 250 MG: 250 TABLET, FILM COATED, EXTENDED RELEASE ORAL at 20:31

## 2018-01-01 RX ADMIN — ISOSORBIDE MONONITRATE 30 MG: 60 TABLET, EXTENDED RELEASE ORAL at 08:58

## 2018-01-01 RX ADMIN — MICONAZOLE NITRATE: 20 POWDER TOPICAL at 20:45

## 2018-01-01 RX ADMIN — VITAMIN D, TAB 1000IU (100/BT) 2000 UNITS: 25 TAB at 22:14

## 2018-01-01 RX ADMIN — IPRATROPIUM BROMIDE AND ALBUTEROL SULFATE 3 ML: .5; 3 SOLUTION RESPIRATORY (INHALATION) at 00:08

## 2018-01-01 RX ADMIN — DONEPEZIL HYDROCHLORIDE 20 MG: 5 TABLET ORAL at 21:18

## 2018-01-01 RX ADMIN — DOCUSATE SODIUM AND SENNOSIDES 2 TABLET: 8.6; 5 TABLET, FILM COATED ORAL at 23:20

## 2018-01-01 RX ADMIN — SENNOSIDES AND DOCUSATE SODIUM 2 TABLET: 8.6; 5 TABLET ORAL at 08:51

## 2018-01-01 RX ADMIN — MICONAZOLE NITRATE: 20 POWDER TOPICAL at 20:49

## 2018-01-01 RX ADMIN — POLYETHYLENE GLYCOL 3350 17 G: 17 POWDER, FOR SOLUTION ORAL at 10:45

## 2018-01-01 RX ADMIN — LACTULOSE 10 G: 20 SOLUTION ORAL at 08:41

## 2018-01-01 RX ADMIN — POLYETHYLENE GLYCOL 3350 17 G: 17 POWDER, FOR SOLUTION ORAL at 10:17

## 2018-01-01 RX ADMIN — ASPIRIN 81 MG CHEWABLE TABLET 81 MG: 81 TABLET CHEWABLE at 08:28

## 2018-01-01 RX ADMIN — LACTULOSE 10 G: 20 SOLUTION ORAL at 10:06

## 2018-01-01 RX ADMIN — DOCUSATE SODIUM AND SENNOSIDES 2 TABLET: 8.6; 5 TABLET, FILM COATED ORAL at 21:27

## 2018-01-01 RX ADMIN — MEMANTINE HYDROCHLORIDE 10 MG: 10 TABLET ORAL at 20:28

## 2018-01-01 RX ADMIN — AMPICILLIN 500 MG: 500 CAPSULE ORAL at 05:46

## 2018-01-01 RX ADMIN — MICONAZOLE NITRATE: 20 POWDER TOPICAL at 09:38

## 2018-01-01 RX ADMIN — MICONAZOLE NITRATE: 20 POWDER TOPICAL at 20:21

## 2018-01-01 RX ADMIN — MEMANTINE HYDROCHLORIDE 10 MG: 10 TABLET ORAL at 21:12

## 2018-01-01 RX ADMIN — LEVOTHYROXINE SODIUM 50 MCG: 50 TABLET ORAL at 06:13

## 2018-01-01 RX ADMIN — LACTULOSE 10 G: 20 SOLUTION ORAL at 08:26

## 2018-01-01 RX ADMIN — MEMANTINE HYDROCHLORIDE 10 MG: 10 TABLET ORAL at 21:42

## 2018-01-01 RX ADMIN — PETROLATUM: 42 OINTMENT TOPICAL at 10:20

## 2018-01-01 RX ADMIN — ASPIRIN 81 MG CHEWABLE TABLET 81 MG: 81 TABLET CHEWABLE at 08:34

## 2018-01-01 RX ADMIN — DIVALPROEX SODIUM 250 MG: 250 TABLET, FILM COATED, EXTENDED RELEASE ORAL at 21:09

## 2018-01-01 RX ADMIN — SENNOSIDES AND DOCUSATE SODIUM 2 TABLET: 8.6; 5 TABLET ORAL at 23:19

## 2018-01-01 RX ADMIN — PETROLATUM: 42 OINTMENT TOPICAL at 21:57

## 2018-01-01 RX ADMIN — MICONAZOLE NITRATE: 20 POWDER TOPICAL at 20:07

## 2018-01-01 RX ADMIN — LEVOTHYROXINE SODIUM 50 MCG: 50 TABLET ORAL at 06:02

## 2018-01-01 RX ADMIN — AMPICILLIN 500 MG: 500 CAPSULE ORAL at 06:25

## 2018-01-01 RX ADMIN — MICONAZOLE NITRATE: 20 POWDER TOPICAL at 08:47

## 2018-01-01 RX ADMIN — SENNOSIDES AND DOCUSATE SODIUM 2 TABLET: 8.6; 5 TABLET ORAL at 10:09

## 2018-01-01 RX ADMIN — AMPICILLIN 500 MG: 500 CAPSULE ORAL at 21:44

## 2018-01-01 RX ADMIN — LEVOTHYROXINE SODIUM 50 MCG: 50 TABLET ORAL at 06:09

## 2018-01-01 RX ADMIN — DONEPEZIL HYDROCHLORIDE 20 MG: 10 TABLET, FILM COATED ORAL at 20:57

## 2018-01-01 RX ADMIN — TAMSULOSIN HYDROCHLORIDE 0.4 MG: 0.4 CAPSULE ORAL at 10:10

## 2018-01-01 RX ADMIN — PETROLATUM: 42 OINTMENT TOPICAL at 08:38

## 2018-01-01 RX ADMIN — MEMANTINE HYDROCHLORIDE 10 MG: 10 TABLET ORAL at 08:51

## 2018-01-01 RX ADMIN — MEMANTINE HYDROCHLORIDE 10 MG: 10 TABLET ORAL at 10:12

## 2018-01-01 RX ADMIN — LACTULOSE 10 G: 20 SOLUTION ORAL at 21:04

## 2018-01-01 RX ADMIN — VITAMIN D, TAB 1000IU (100/BT) 2000 UNITS: 25 TAB at 22:43

## 2018-01-01 RX ADMIN — DONEPEZIL HYDROCHLORIDE 20 MG: 5 TABLET ORAL at 20:31

## 2018-01-01 RX ADMIN — MODAFINIL 100 MG: 100 TABLET ORAL at 08:50

## 2018-01-01 RX ADMIN — MICONAZOLE NITRATE: 20 POWDER TOPICAL at 08:56

## 2018-01-01 RX ADMIN — MEMANTINE HYDROCHLORIDE 10 MG: 10 TABLET ORAL at 10:00

## 2018-01-01 RX ADMIN — VITAMIN D, TAB 1000IU (100/BT) 2000 UNITS: 25 TAB at 08:58

## 2018-01-01 RX ADMIN — LACTULOSE 10 G: 20 SOLUTION ORAL at 09:18

## 2018-01-01 RX ADMIN — PETROLATUM: 42 OINTMENT TOPICAL at 20:46

## 2018-01-01 RX ADMIN — VITAMIN D, TAB 1000IU (100/BT) 2000 UNITS: 25 TAB at 19:53

## 2018-01-01 RX ADMIN — MODAFINIL 100 MG: 100 TABLET ORAL at 08:25

## 2018-01-01 RX ADMIN — ISOSORBIDE MONONITRATE 30 MG: 60 TABLET, EXTENDED RELEASE ORAL at 09:16

## 2018-01-01 RX ADMIN — VITAMIN D, TAB 1000IU (100/BT) 2000 UNITS: 25 TAB at 21:33

## 2018-01-01 RX ADMIN — TAMSULOSIN HYDROCHLORIDE 0.4 MG: 0.4 CAPSULE ORAL at 10:19

## 2018-01-01 RX ADMIN — MICONAZOLE NITRATE: 20 POWDER TOPICAL at 20:15

## 2018-01-01 RX ADMIN — TAMSULOSIN HYDROCHLORIDE 0.4 MG: 0.4 CAPSULE ORAL at 08:32

## 2018-01-01 RX ADMIN — LACTULOSE 10 G: 20 SOLUTION ORAL at 08:58

## 2018-01-01 RX ADMIN — SENNOSIDES AND DOCUSATE SODIUM 2 TABLET: 8.6; 5 TABLET ORAL at 21:29

## 2018-01-01 RX ADMIN — ISOSORBIDE MONONITRATE 30 MG: 30 TABLET ORAL at 08:39

## 2018-01-01 RX ADMIN — VITAMIN D, TAB 1000IU (100/BT) 2000 UNITS: 25 TAB at 20:55

## 2018-01-01 RX ADMIN — POLYETHYLENE GLYCOL 3350 17 G: 17 POWDER, FOR SOLUTION ORAL at 15:29

## 2018-01-01 RX ADMIN — SENNOSIDES AND DOCUSATE SODIUM 2 TABLET: 8.6; 5 TABLET ORAL at 21:00

## 2018-01-01 RX ADMIN — POLYETHYLENE GLYCOL 3350 17 G: 17 POWDER, FOR SOLUTION ORAL at 08:37

## 2018-01-01 RX ADMIN — ASPIRIN 81 MG CHEWABLE TABLET 81 MG: 81 TABLET CHEWABLE at 09:33

## 2018-01-01 RX ADMIN — PETROLATUM: 42 OINTMENT TOPICAL at 08:49

## 2018-01-01 RX ADMIN — IPRATROPIUM BROMIDE AND ALBUTEROL SULFATE 3 ML: .5; 3 SOLUTION RESPIRATORY (INHALATION) at 17:05

## 2018-01-01 RX ADMIN — TAMSULOSIN HYDROCHLORIDE 0.4 MG: 0.4 CAPSULE ORAL at 08:52

## 2018-01-01 RX ADMIN — VITAMIN D, TAB 1000IU (100/BT) 2000 UNITS: 25 TAB at 08:17

## 2018-01-01 RX ADMIN — LACTULOSE 10 G: 20 SOLUTION ORAL at 09:02

## 2018-01-01 RX ADMIN — ASPIRIN 81 MG CHEWABLE TABLET 81 MG: 81 TABLET CHEWABLE at 10:14

## 2018-01-01 RX ADMIN — SENNOSIDES AND DOCUSATE SODIUM 2 TABLET: 8.6; 5 TABLET ORAL at 20:59

## 2018-01-01 RX ADMIN — ENOXAPARIN SODIUM 40 MG: 40 INJECTION SUBCUTANEOUS at 21:11

## 2018-01-01 RX ADMIN — VITAMIN D, TAB 1000IU (100/BT) 2000 UNITS: 25 TAB at 09:35

## 2018-01-01 RX ADMIN — POLYETHYLENE GLYCOL 3350 17 G: 17 POWDER, FOR SOLUTION ORAL at 08:28

## 2018-01-01 RX ADMIN — MICONAZOLE NITRATE: 20 POWDER TOPICAL at 20:54

## 2018-01-01 RX ADMIN — MODAFINIL 100 MG: 100 TABLET ORAL at 08:57

## 2018-01-01 RX ADMIN — LACTULOSE 10 G: 20 SOLUTION ORAL at 08:12

## 2018-01-01 RX ADMIN — MEMANTINE HYDROCHLORIDE 10 MG: 10 TABLET ORAL at 10:45

## 2018-01-01 RX ADMIN — METOPROLOL TARTRATE 12.5 MG: 25 TABLET, FILM COATED ORAL at 14:40

## 2018-01-01 RX ADMIN — LEVOTHYROXINE SODIUM 50 MCG: 50 TABLET ORAL at 08:23

## 2018-01-01 RX ADMIN — LACTULOSE 10 G: 20 SOLUTION ORAL at 20:07

## 2018-01-01 RX ADMIN — ASPIRIN 81 MG CHEWABLE TABLET 81 MG: 81 TABLET CHEWABLE at 08:06

## 2018-01-01 RX ADMIN — LACTULOSE 10 G: 20 SOLUTION ORAL at 22:00

## 2018-01-01 RX ADMIN — LACTULOSE 10 G: 20 SOLUTION ORAL at 09:51

## 2018-01-01 RX ADMIN — DONEPEZIL HYDROCHLORIDE 20 MG: 5 TABLET ORAL at 21:26

## 2018-01-01 RX ADMIN — SENNOSIDES AND DOCUSATE SODIUM 2 TABLET: 8.6; 5 TABLET ORAL at 00:08

## 2018-01-01 RX ADMIN — LACTULOSE 10 G: 20 SOLUTION ORAL at 09:53

## 2018-01-01 RX ADMIN — LEVOTHYROXINE SODIUM 50 MCG: 50 TABLET ORAL at 06:30

## 2018-01-01 RX ADMIN — LEVOTHYROXINE SODIUM 50 MCG: 50 TABLET ORAL at 05:37

## 2018-01-01 RX ADMIN — DOCUSATE SODIUM -SENNOSIDES 2 TABLET: 50; 8.6 TABLET, COATED ORAL at 20:20

## 2018-01-01 RX ADMIN — PETROLATUM: 42 OINTMENT TOPICAL at 21:19

## 2018-01-01 RX ADMIN — POLYETHYLENE GLYCOL 3350 17 G: 17 POWDER, FOR SOLUTION ORAL at 08:48

## 2018-01-01 RX ADMIN — TAMSULOSIN HYDROCHLORIDE 0.4 MG: 0.4 CAPSULE ORAL at 10:13

## 2018-01-01 RX ADMIN — LACTULOSE 10 G: 20 SOLUTION ORAL at 08:34

## 2018-01-01 RX ADMIN — AMLODIPINE BESYLATE 2.5 MG: 2.5 TABLET ORAL at 11:41

## 2018-01-01 RX ADMIN — TAMSULOSIN HYDROCHLORIDE 0.4 MG: 0.4 CAPSULE ORAL at 09:59

## 2018-01-01 RX ADMIN — AMOXICILLIN AND CLAVULANATE POTASSIUM 400 MG: 400; 57 POWDER, FOR SUSPENSION ORAL at 10:20

## 2018-01-01 RX ADMIN — LACTULOSE 10 G: 20 SOLUTION ORAL at 21:11

## 2018-01-01 RX ADMIN — MICONAZOLE NITRATE: 20 POWDER TOPICAL at 09:58

## 2018-01-01 RX ADMIN — VITAMIN D, TAB 1000IU (100/BT) 2000 UNITS: 25 TAB at 20:49

## 2018-01-01 RX ADMIN — VITAMIN D, TAB 1000IU (100/BT) 2000 UNITS: 25 TAB at 09:30

## 2018-01-01 RX ADMIN — ISOSORBIDE MONONITRATE 30 MG: 60 TABLET, EXTENDED RELEASE ORAL at 08:57

## 2018-01-01 RX ADMIN — MICONAZOLE NITRATE: 20 POWDER TOPICAL at 09:08

## 2018-01-01 RX ADMIN — POLYETHYLENE GLYCOL 3350 17 G: 17 POWDER, FOR SOLUTION ORAL at 08:30

## 2018-01-01 RX ADMIN — SENNOSIDES AND DOCUSATE SODIUM 2 TABLET: 8.6; 5 TABLET ORAL at 21:20

## 2018-01-01 RX ADMIN — VITAMIN D, TAB 1000IU (100/BT) 2000 UNITS: 25 TAB at 21:54

## 2018-01-01 RX ADMIN — ASPIRIN 81 MG CHEWABLE TABLET 81 MG: 81 TABLET CHEWABLE at 09:16

## 2018-01-01 RX ADMIN — METOPROLOL TARTRATE 12.5 MG: 25 TABLET, FILM COATED ORAL at 08:42

## 2018-01-01 RX ADMIN — PETROLATUM: 42 OINTMENT TOPICAL at 20:31

## 2018-01-01 RX ADMIN — AMLODIPINE BESYLATE 2.5 MG: 2.5 TABLET ORAL at 08:35

## 2018-01-01 RX ADMIN — TAZOBACTAM SODIUM AND PIPERACILLIN SODIUM 3.38 G: 375; 3 INJECTION, SOLUTION INTRAVENOUS at 21:15

## 2018-01-01 RX ADMIN — SENNOSIDES AND DOCUSATE SODIUM 2 TABLET: 8.6; 5 TABLET ORAL at 08:28

## 2018-01-01 RX ADMIN — ISOSORBIDE MONONITRATE 30 MG: 60 TABLET, EXTENDED RELEASE ORAL at 11:14

## 2018-01-01 RX ADMIN — SODIUM CHLORIDE 75 ML/HR: 9 INJECTION, SOLUTION INTRAVENOUS at 01:47

## 2018-01-01 RX ADMIN — LACTULOSE 10 G: 20 SOLUTION ORAL at 09:11

## 2018-01-01 RX ADMIN — IPRATROPIUM BROMIDE AND ALBUTEROL SULFATE 3 ML: .5; 3 SOLUTION RESPIRATORY (INHALATION) at 06:25

## 2018-01-01 RX ADMIN — TAMSULOSIN HYDROCHLORIDE 0.4 MG: 0.4 CAPSULE ORAL at 11:41

## 2018-01-01 RX ADMIN — TAMSULOSIN HYDROCHLORIDE 0.4 MG: 0.4 CAPSULE ORAL at 08:51

## 2018-01-01 RX ADMIN — TAMSULOSIN HYDROCHLORIDE 0.4 MG: 0.4 CAPSULE ORAL at 09:06

## 2018-01-01 RX ADMIN — TAMSULOSIN HYDROCHLORIDE 0.4 MG: 0.4 CAPSULE ORAL at 08:28

## 2018-01-01 RX ADMIN — SENNOSIDES AND DOCUSATE SODIUM 2 TABLET: 8.6; 5 TABLET ORAL at 09:37

## 2018-01-01 RX ADMIN — MODAFINIL 100 MG: 100 TABLET ORAL at 10:13

## 2018-01-01 RX ADMIN — LEVOTHYROXINE SODIUM 50 MCG: 50 TABLET ORAL at 06:28

## 2018-01-01 RX ADMIN — LACTULOSE 10 G: 20 SOLUTION ORAL at 21:54

## 2018-01-01 RX ADMIN — MODAFINIL 100 MG: 100 TABLET ORAL at 09:45

## 2018-01-01 RX ADMIN — PETROLATUM: 42 OINTMENT TOPICAL at 21:25

## 2018-01-01 RX ADMIN — VITAMIN D, TAB 1000IU (100/BT) 2000 UNITS: 25 TAB at 09:32

## 2018-01-01 RX ADMIN — ASPIRIN 81 MG CHEWABLE TABLET 81 MG: 81 TABLET CHEWABLE at 10:19

## 2018-01-01 RX ADMIN — MEMANTINE HYDROCHLORIDE 10 MG: 10 TABLET ORAL at 23:29

## 2018-01-01 RX ADMIN — TAMSULOSIN HYDROCHLORIDE 0.4 MG: 0.4 CAPSULE ORAL at 10:03

## 2018-01-01 RX ADMIN — METOPROLOL TARTRATE 12.5 MG: 25 TABLET, FILM COATED ORAL at 20:34

## 2018-01-01 RX ADMIN — VITAMIN D, TAB 1000IU (100/BT) 2000 UNITS: 25 TAB at 09:16

## 2018-01-01 RX ADMIN — ISOSORBIDE MONONITRATE 30 MG: 60 TABLET, EXTENDED RELEASE ORAL at 09:45

## 2018-01-01 RX ADMIN — LACTULOSE 10 G: 20 SOLUTION ORAL at 08:06

## 2018-01-01 RX ADMIN — MICONAZOLE NITRATE: 20 POWDER TOPICAL at 20:14

## 2018-01-01 RX ADMIN — LACTULOSE 10 G: 20 SOLUTION ORAL at 10:21

## 2018-01-01 RX ADMIN — ASPIRIN 81 MG CHEWABLE TABLET 81 MG: 81 TABLET CHEWABLE at 10:44

## 2018-01-01 RX ADMIN — MICONAZOLE NITRATE: 20 POWDER TOPICAL at 21:57

## 2018-01-01 RX ADMIN — SENNOSIDES AND DOCUSATE SODIUM 2 TABLET: 8.6; 5 TABLET ORAL at 21:14

## 2018-01-01 RX ADMIN — VITAMIN D, TAB 1000IU (100/BT) 2000 UNITS: 25 TAB at 11:14

## 2018-01-01 RX ADMIN — TAMSULOSIN HYDROCHLORIDE 0.4 MG: 0.4 CAPSULE ORAL at 09:32

## 2018-01-01 RX ADMIN — VITAMIN D, TAB 1000IU (100/BT) 2000 UNITS: 25 TAB at 08:28

## 2018-01-01 RX ADMIN — VITAMIN D, TAB 1000IU (100/BT) 2000 UNITS: 25 TAB at 09:53

## 2018-01-01 RX ADMIN — ASPIRIN 81 MG CHEWABLE TABLET 81 MG: 81 TABLET CHEWABLE at 10:46

## 2018-01-01 RX ADMIN — SENNOSIDES AND DOCUSATE SODIUM 2 TABLET: 8.6; 5 TABLET ORAL at 09:36

## 2018-01-01 RX ADMIN — MODAFINIL 100 MG: 100 TABLET ORAL at 11:37

## 2018-01-01 RX ADMIN — SENNOSIDES AND DOCUSATE SODIUM 2 TABLET: 8.6; 5 TABLET ORAL at 11:13

## 2018-01-01 RX ADMIN — TAZOBACTAM SODIUM AND PIPERACILLIN SODIUM 3.38 G: 375; 3 INJECTION, SOLUTION INTRAVENOUS at 22:09

## 2018-01-01 RX ADMIN — TAZOBACTAM SODIUM AND PIPERACILLIN SODIUM 3.38 G: 375; 3 INJECTION, SOLUTION INTRAVENOUS at 13:15

## 2018-01-01 RX ADMIN — LACTULOSE 10 G: 20 SOLUTION ORAL at 10:08

## 2018-01-01 RX ADMIN — POTASSIUM CHLORIDE AND SODIUM CHLORIDE 100 ML/HR: 900; 150 INJECTION, SOLUTION INTRAVENOUS at 14:24

## 2018-01-01 RX ADMIN — MEMANTINE HYDROCHLORIDE 10 MG: 10 TABLET, FILM COATED ORAL at 11:40

## 2018-01-01 RX ADMIN — SENNOSIDES AND DOCUSATE SODIUM 2 TABLET: 8.6; 5 TABLET ORAL at 20:07

## 2018-01-01 RX ADMIN — LACTULOSE 10 G: 20 SOLUTION ORAL at 10:11

## 2018-01-01 RX ADMIN — MODAFINIL 100 MG: 100 TABLET ORAL at 09:11

## 2018-01-01 RX ADMIN — PETROLATUM: 42 OINTMENT TOPICAL at 10:14

## 2018-01-01 RX ADMIN — PETROLATUM: 42 OINTMENT TOPICAL at 21:17

## 2018-01-01 RX ADMIN — LEVOTHYROXINE SODIUM 50 MCG: 50 TABLET ORAL at 05:10

## 2018-01-01 RX ADMIN — MICONAZOLE NITRATE: 20 POWDER TOPICAL at 21:21

## 2018-01-01 RX ADMIN — ISOSORBIDE MONONITRATE 30 MG: 60 TABLET, EXTENDED RELEASE ORAL at 08:42

## 2018-01-01 RX ADMIN — LACTULOSE 10 G: 20 SOLUTION ORAL at 20:13

## 2018-01-01 RX ADMIN — METOPROLOL TARTRATE 12.5 MG: 25 TABLET, FILM COATED ORAL at 20:09

## 2018-01-01 RX ADMIN — MICONAZOLE NITRATE: 20 POWDER TOPICAL at 21:31

## 2018-01-01 RX ADMIN — DONEPEZIL HYDROCHLORIDE 20 MG: 5 TABLET ORAL at 21:29

## 2018-01-01 RX ADMIN — LACTULOSE 10 G: 20 SOLUTION ORAL at 08:42

## 2018-01-01 RX ADMIN — AMPICILLIN 500 MG: 500 CAPSULE ORAL at 05:09

## 2018-01-01 RX ADMIN — DIVALPROEX SODIUM 250 MG: 250 TABLET, FILM COATED, EXTENDED RELEASE ORAL at 20:49

## 2018-01-01 RX ADMIN — VITAMIN D, TAB 1000IU (100/BT) 2000 UNITS: 25 TAB at 10:19

## 2018-01-01 RX ADMIN — LACTULOSE 10 G: 20 SOLUTION ORAL at 20:10

## 2018-01-01 RX ADMIN — LACTULOSE 10 G: 20 SOLUTION ORAL at 11:17

## 2018-01-01 RX ADMIN — ASPIRIN 81 MG CHEWABLE TABLET 81 MG: 81 TABLET CHEWABLE at 11:13

## 2018-01-01 RX ADMIN — MICONAZOLE NITRATE: 20 POWDER TOPICAL at 09:51

## 2018-01-01 RX ADMIN — VITAMIN D, TAB 1000IU (100/BT) 2000 UNITS: 25 TAB at 08:29

## 2018-01-01 RX ADMIN — MEMANTINE HYDROCHLORIDE 10 MG: 10 TABLET, FILM COATED ORAL at 09:25

## 2018-01-01 RX ADMIN — DIVALPROEX SODIUM 250 MG: 250 TABLET, FILM COATED, EXTENDED RELEASE ORAL at 20:57

## 2018-01-01 RX ADMIN — LACTULOSE 10 G: 20 SOLUTION ORAL at 08:40

## 2018-01-01 RX ADMIN — LACTULOSE 10 G: 20 SOLUTION ORAL at 08:24

## 2018-01-01 RX ADMIN — LEVOTHYROXINE SODIUM 50 MCG: 50 TABLET ORAL at 06:42

## 2018-01-01 RX ADMIN — DONEPEZIL HYDROCHLORIDE 20 MG: 5 TABLET ORAL at 20:59

## 2018-01-01 RX ADMIN — AMOXICILLIN AND CLAVULANATE POTASSIUM 1 TABLET: 875; 125 TABLET, FILM COATED ORAL at 16:38

## 2018-01-01 RX ADMIN — MICONAZOLE NITRATE: 20 POWDER TOPICAL at 09:28

## 2018-01-01 RX ADMIN — ASPIRIN 81 MG CHEWABLE TABLET 81 MG: 81 TABLET CHEWABLE at 08:37

## 2018-01-01 RX ADMIN — DONEPEZIL HYDROCHLORIDE 20 MG: 5 TABLET ORAL at 23:29

## 2018-01-01 RX ADMIN — LACTULOSE 10 G: 20 SOLUTION ORAL at 20:08

## 2018-01-01 RX ADMIN — LACTULOSE 10 G: 20 SOLUTION ORAL at 09:33

## 2018-01-01 RX ADMIN — SENNOSIDES AND DOCUSATE SODIUM 2 TABLET: 8.6; 5 TABLET ORAL at 22:43

## 2018-01-01 RX ADMIN — MICONAZOLE NITRATE: 20 POWDER TOPICAL at 08:28

## 2018-01-01 RX ADMIN — LEVOTHYROXINE SODIUM 50 MCG: 50 TABLET ORAL at 05:26

## 2018-01-01 RX ADMIN — MICONAZOLE NITRATE: 20 POWDER TOPICAL at 09:42

## 2018-01-01 RX ADMIN — VITAMIN D, TAB 1000IU (100/BT) 2000 UNITS: 25 TAB at 20:52

## 2018-01-01 RX ADMIN — LACTULOSE 10 G: 20 SOLUTION ORAL at 23:29

## 2018-01-01 RX ADMIN — IPRATROPIUM BROMIDE AND ALBUTEROL SULFATE 3 ML: .5; 3 SOLUTION RESPIRATORY (INHALATION) at 06:03

## 2018-01-01 RX ADMIN — TAMSULOSIN HYDROCHLORIDE 0.4 MG: 0.4 CAPSULE ORAL at 09:25

## 2018-01-01 RX ADMIN — IPRATROPIUM BROMIDE AND ALBUTEROL SULFATE 3 ML: .5; 3 SOLUTION RESPIRATORY (INHALATION) at 17:01

## 2018-01-01 RX ADMIN — ISOSORBIDE MONONITRATE 30 MG: 60 TABLET, EXTENDED RELEASE ORAL at 09:53

## 2018-01-01 RX ADMIN — LACTULOSE 10 G: 20 SOLUTION ORAL at 10:16

## 2018-01-01 RX ADMIN — PETROLATUM: 42 OINTMENT TOPICAL at 20:32

## 2018-01-01 RX ADMIN — METOPROLOL TARTRATE 12.5 MG: 25 TABLET, FILM COATED ORAL at 08:47

## 2018-01-01 RX ADMIN — TAMSULOSIN HYDROCHLORIDE 0.4 MG: 0.4 CAPSULE ORAL at 09:20

## 2018-01-01 RX ADMIN — ACETAMINOPHEN 650 MG: 325 TABLET, FILM COATED ORAL at 10:05

## 2018-01-01 RX ADMIN — IPRATROPIUM BROMIDE AND ALBUTEROL SULFATE 3 ML: .5; 3 SOLUTION RESPIRATORY (INHALATION) at 08:56

## 2018-01-01 RX ADMIN — TAZOBACTAM SODIUM AND PIPERACILLIN SODIUM 3.38 G: 375; 3 INJECTION, SOLUTION INTRAVENOUS at 20:58

## 2018-01-01 RX ADMIN — MEMANTINE HYDROCHLORIDE 10 MG: 10 TABLET ORAL at 00:10

## 2018-01-01 RX ADMIN — MICONAZOLE NITRATE: 20 POWDER TOPICAL at 10:23

## 2018-01-01 RX ADMIN — DIVALPROEX SODIUM 250 MG: 250 TABLET, FILM COATED, EXTENDED RELEASE ORAL at 21:45

## 2018-01-01 RX ADMIN — MICONAZOLE NITRATE: 20 POWDER TOPICAL at 19:54

## 2018-01-01 RX ADMIN — MICONAZOLE NITRATE: 20 POWDER TOPICAL at 08:37

## 2018-01-01 RX ADMIN — VITAMIN D, TAB 1000IU (100/BT) 2000 UNITS: 25 TAB at 10:03

## 2018-01-01 RX ADMIN — TAMSULOSIN HYDROCHLORIDE 0.4 MG: 0.4 CAPSULE ORAL at 10:44

## 2018-01-01 RX ADMIN — LEVOTHYROXINE SODIUM 50 MCG: 50 TABLET ORAL at 05:16

## 2018-01-01 RX ADMIN — MEMANTINE HYDROCHLORIDE 10 MG: 10 TABLET ORAL at 08:38

## 2018-01-01 RX ADMIN — ISOSORBIDE MONONITRATE 30 MG: 60 TABLET, EXTENDED RELEASE ORAL at 10:00

## 2018-01-01 RX ADMIN — SENNOSIDES AND DOCUSATE SODIUM 2 TABLET: 8.6; 5 TABLET ORAL at 21:41

## 2018-01-01 RX ADMIN — MEMANTINE HYDROCHLORIDE 10 MG: 10 TABLET ORAL at 10:14

## 2018-01-01 RX ADMIN — ASPIRIN 81 MG: 81 TABLET, CHEWABLE ORAL at 08:35

## 2018-01-01 RX ADMIN — DIVALPROEX SODIUM 250 MG: 250 TABLET, FILM COATED, EXTENDED RELEASE ORAL at 20:40

## 2018-01-01 RX ADMIN — IPRATROPIUM BROMIDE AND ALBUTEROL SULFATE 3 ML: .5; 3 SOLUTION RESPIRATORY (INHALATION) at 00:10

## 2018-01-01 RX ADMIN — TAMSULOSIN HYDROCHLORIDE 0.4 MG: 0.4 CAPSULE ORAL at 09:05

## 2018-01-01 RX ADMIN — SENNOSIDES AND DOCUSATE SODIUM 2 TABLET: 8.6; 5 TABLET ORAL at 08:31

## 2018-01-01 RX ADMIN — ACETAMINOPHEN 650 MG: 325 TABLET, FILM COATED ORAL at 21:11

## 2018-01-01 RX ADMIN — METOPROLOL TARTRATE 12.5 MG: 25 TABLET, FILM COATED ORAL at 08:37

## 2018-01-01 RX ADMIN — POLYETHYLENE GLYCOL 3350 17 G: 17 POWDER, FOR SOLUTION ORAL at 10:06

## 2018-01-01 RX ADMIN — TAMSULOSIN HYDROCHLORIDE 0.4 MG: 0.4 CAPSULE ORAL at 09:33

## 2018-01-01 RX ADMIN — MICONAZOLE NITRATE: 20 POWDER TOPICAL at 10:20

## 2018-01-01 RX ADMIN — MICONAZOLE NITRATE: 20 POWDER TOPICAL at 23:30

## 2018-01-01 RX ADMIN — PETROLATUM: 42 OINTMENT TOPICAL at 20:09

## 2018-01-01 RX ADMIN — ACETAMINOPHEN 650 MG: 325 TABLET, FILM COATED ORAL at 18:14

## 2018-01-01 RX ADMIN — MICONAZOLE NITRATE: 20 POWDER TOPICAL at 21:07

## 2018-01-01 RX ADMIN — METOPROLOL TARTRATE 12.5 MG: 25 TABLET, FILM COATED ORAL at 20:45

## 2018-01-01 RX ADMIN — LACTULOSE 10 G: 20 SOLUTION ORAL at 10:00

## 2018-01-01 NOTE — THERAPY TREATMENT NOTE
Acute Care - Physical Therapy Treatment Note  [facility]     Patient Name: Alexandro Hallman  : 1940  MRN: [id number]  Today's Date: 2018  Onset of Illness/Injury or Date of Surgery Date: 17  Date of Referral to PT: 17  Referring Physician: Tran    Admit Date: 2017    Visit Dx:    ICD-10-CM ICD-9-CM   1. Generalized weakness R53.1 780.79   2. Difficulty walking R26.2 719.7     Patient Active Problem List   Diagnosis   • Alzheimer's type dementia   • Central spinal stenosis   • Essential hypertension   • Hypothyroidism   • Benign prostatic hypertrophy (BPH) with incomplete bladder emptying   • Communicating hydrocephalus   • Parkinson's disease   • Gait instability   • Weakness   • Hallucinations, unspecified   • Generalized weakness   • Pneumonia               Adult Rehabilitation Note       18 1300          Rehab Assessment/Intervention    Discipline physical therapist  -      Document Type therapy note (daily note)  -      Subjective Information agree to therapy;complains of;weakness;fatigue  -LC      Patient Effort, Rehab Treatment good  -      Precautions/Limitations fall precautions   confusion  -LC      Recorded by [LC] Nacho Esteban, PT DPT      Pain Assessment    Pain Assessment No/denies pain  -LC      Recorded by [LC] Nacho Esteban, PT DPT      Cognitive Assessment/Intervention    Current Cognitive/Communication Assessment impaired  -      Orientation Status oriented to;person;situation;place  -      Follows Commands/Answers Questions 75% of the time;able to follow single-step instructions;needs cueing;needs increased time;needs repetition  -      Personal Safety moderate impairment;at risk behaviors demonstrated;decreased awareness, need for assist;decreased awareness, need for safety;decreased insight to deficits  -      Personal Safety Interventions elopement precautions initiated;fall prevention program maintained;gait belt;muscle strengthening  facilitated;nonskid shoes/slippers when out of bed  -      Recorded by [LC] Nacho Esteban, PT DPT      Bed Mobility, Assessment/Treatment    Bed Mobility, Assistive Device bed rails;head of bed elevated  -      Bed Mobility, Scoot/Bridge, [place] minimum assist (75% patient effort)  -      Bed Mob, Supine to Sit, [place] minimum assist (75% patient effort)  -      Bed Mob, Sit to Supine, [place] contact guard assist  -      Bed Mobility, Impairments strength decreased;impaired balance;coordination impaired;motor control impaired;postural control impaired  -      Recorded by [LC] Nacho Esteban, PT DPT      Transfer Assessment/Treatment    Transfers, Sit-Stand [place] moderate assist (50% patient effort)  -      Transfers, Stand-Sit [place] moderate assist (50% patient effort)  -      Transfers, Sit-Stand-Sit, Assist Device --   HHA  -      Transfer, Safety Issues loses balance backward;weight-shifting ability decreased;sequencing ability decreased;balance decreased during turns;step length decreased  -      Transfer, Impairments strength decreased;impaired balance;coordination impaired;motor control impaired;postural control impaired  -      Recorded by [LC] Nacho Esteban, PT DPT      Gait Assessment/Treatment    Gait, [place] Level moderate assist (50% patient effort);2 person assist required  -      Gait, Assistive Device --   HHA  -      Gait, Distance (Feet) 4   rest, 4ft, seated rest, 4 ft towards \[facility]\""  -      Gait, Gait Deviations step length decreased;stride length decreased;toe-to-floor clearance decreased;festinating   scissorring  -      Gait, Safety Issues sequencing ability decreased;step length decreased;weight-shifting ability decreased;loses balance backward  -      Gait, Impairments strength decreased;impaired balance  -      Recorded by [LC] Nacho Esteban, PT DPT      Positioning and Restraints    Pre-Treatment Position in bed  -       Post Treatment Position bed  -LC      In Bed fowlers;call light within reach;encouraged to call for assist;exit alarm on;patient within staff view;side rails up x3;notified nsg  -LC      Recorded by [BLANCA] Nacho Esteban, PT DPT        User Key  (r) = Recorded By, (t) = Taken By, (c) = Cosigned By    Initials Name Effective Dates    BLANCA Esteban, PT DPT 08/02/16 -                 IP PT Goals       12/31/17 1118          Bed Mobility PT LTG    Bed Mobility PT LTG, Date Established 12/31/17  -EE      Bed Mobility PT LTG, Time to Achieve 1 wk  -EE      Bed Mobility PT LTG, Activity Type all bed mobility  -EE      Bed Mobility PT LTG, [place] Level minimum assist (75% patient effort);2 person assist required  -EE      Transfer Training PT LTG    Transfer Training PT LTG, Date Established 12/31/17  -EE      Transfer Training PT LTG, Time to Achieve 1 wk  -EE      Transfer Training PT LTG, Activity Type all transfers  -EE      Transfer Training PT LTG, [place] Level moderate assist (50% patient effort);2 person assist required  -EE      Transfer Training PT LTG, Assist Device walker, rolling  -EE      Gait Training PT LTG    Gait Training Goal PT LTG, Date Established 12/31/17  -EE      Gait Training Goal PT LTG, Time to Achieve 1 wk  -EE      Gait Training Goal PT LTG, [place] Level moderate assist (50% patient effort);2 person assist required  -EE      Gait Training Goal PT LTG, Assist Device walker, rolling  -EE      Gait Training Goal PT LTG, Distance to Achieve 5  -EE        User Key  (r) = Recorded By, (t) = Taken By, (c) = Cosigned By    Initials Name Provider Type    EE Leila Ashley PT Physical Therapist          Physical Therapy Education     Title: PT OT SLP Therapies (Active)     Topic: Physical Therapy (Active)     Point: Mobility training (Done)    Learning Progress Summary    Learner Readiness Method Response Comment Documented by Status   Patient Acceptance E,D NR,DU safety during  transfer and ambulation  01/01/18 1400 Done    Acceptance E,TB NR  EE 12/31/17 1118 Active               Point: Home exercise program (Active)    Learning Progress Summary    Learner Readiness Method Response Comment Documented by Status   Patient Acceptance E,TB NR  EE 12/31/17 1118 Active               Point: Body mechanics (Active)    Learning Progress Summary    Learner Readiness Method Response Comment Documented by Status   Patient Acceptance E,TB NR  EE 12/31/17 1118 Active               Point: Precautions (Active)    Learning Progress Summary    Learner Readiness Method Response Comment Documented by Status   Patient Acceptance E,TB NR  EE 12/31/17 1118 Active                      User Key     Initials Effective Dates Name Provider Type Discipline    EE 12/01/15 -  Leila Ashley, PT Physical Therapist PT     08/02/16 -  Nacho Esteban PT DPT Physical Therapist PT                    PT Recommendation and Plan  Anticipated Discharge Disposition: skilled nursing facility  Planned Therapy Interventions: balance training, bed mobility training, gait training, home exercise program, neuromuscular re-education, patient/family education, ROM (Range of Motion), strengthening, stretching, transfer training  PT Frequency: daily  Plan of Care Review  Plan Of Care Reviewed With: patient  Progress: progress toward functional goals is gradual  Outcome Summary/Follow up Plan: Pt transferred supine to sit with min x 1 and use of bed rail. Pt performed sit to stand with mod x 1 and knees blocked. Pt ambulated 4 ft for 3 reps with 2 sitting rest breaks requiring mod x 2 and HHA. Pt transferred sit to supine with use of bed rail and CGA x 1. Pt demonstrates marked improvement with alertness and functional ability.          Outcome Measures       01/01/18 1400 12/31/17 1100       How much help from another person do you currently need...    Turning from your back to your side while in flat bed without using bedrails? 3  -LC 2   -EE     Moving from lying on back to sitting on the side of a flat bed without bedrails? 3  -LC 2  -EE     Moving to and from a bed to a chair (including a wheelchair)? 2  -LC 2  -EE     Standing up from a chair using your arms (e.g., wheelchair, bedside chair)? 2  -LC 2  -EE     Climbing 3-5 steps with a railing? 1  -LC 1  -EE     To walk in hospital room? 2  -LC 1  -EE     AM-PAC 6 Clicks Score 13  -LC 10  -EE     Functional Assessment    Outcome Measure Options AM-PAC 6 Clicks Basic Mobility (PT)  -LC AM-PAC 6 Clicks Basic Mobility (PT)  -EE       User Key  (r) = Recorded By, (t) = Taken By, (c) = Cosigned By    Initials Name Provider Type    EE Leila Ashley, PT Physical Therapist    BLANCA Esteban, PT DPT Physical Therapist           Time Calculation:         PT Charges       01/01/18 1403          Time Calculation    Start Time 1335  -      Stop Time 1400  -      Time Calculation (min) 25 min  -      PT Received On 01/01/18  -      PT - Next Appointment 01/02/18  -      Time Calculation- PT    Total Timed Code Minutes- PT 25 minute(s)  -        User Key  (r) = Recorded By, (t) = Taken By, (c) = Cosigned By    Initials Name Provider Type    BLANCA Esteban PT DPT Physical Therapist          Therapy Charges for Today     Code Description Service Date Service Provider Modifiers Qty    31043056785  PT THERAPEUTIC ACT EA 15 MIN 1/1/2018 Nacho Esteban PT DPT GP 1    97063813354 HC GAIT TRAINING EA 15 MIN 1/1/2018 Nacho Esteban PT DPT GP 1          PT G-Codes  Outcome Measure Options: AM-PAC 6 Clicks Basic Mobility (PT)  Functional Limitation: Mobility: Walking and moving around  Mobility: Walking and Moving Around Current Status (): At least 60 percent but less than 80 percent impaired, limited or restricted  Mobility: Walking and Moving Around Goal Status (): At least 40 percent but less than 60 percent impaired, limited or restricted    ALDAIR MayberryT  1/1/2018

## 2018-01-01 NOTE — PROGRESS NOTES
LOS: 1 day     Name: Alexandro Hallman  Age/Sex: 77 y.o. male  :  1940        PCP: Buster Howe MD    Subjective   Doing ok remains confused, wife at the bedside    General: No Fever or Chills, Cardiac: No Chest Pain or Palpitations, Resp: No Cough or SOA, GI: No Nausea, Vomiting, or Diarrhea and Other: No bleeding      amLODIPine 2.5 mg Oral Daily   aspirin 81 mg Oral Daily   cholecalciferol 2,000 Units Oral Daily   donepezil 20 mg Oral Nightly   isosorbide mononitrate 30 mg Oral Daily   levothyroxine 50 mcg Oral Q AM   memantine 10 mg Oral Q12H   mirtazapine 30 mg Oral Nightly   piperacillin-tazobactam 3.375 g Intravenous Q8H   risperiDONE 1 mg Oral Nightly   tamsulosin 0.4 mg Oral Daily       sodium chloride 125 mL/hr Last Rate: 125 mL/hr (17 1833)       Objective   Vital Signs  Temp:  [96.5 °F (35.8 °C)-97.2 °F (36.2 °C)] 97.2 °F (36.2 °C)  Heart Rate:  [63-70] 63  Resp:  [16] 16  BP: (113-161)/(71-94) 149/88  Body mass index is 20.62 kg/(m^2).    Intake/Output Summary (Last 24 hours) at 18 0809  Last data filed at 18 0204   Gross per 24 hour   Intake             2030 ml   Output              380 ml   Net             1650 ml       Physical Exam   Constitutional: He appears well-developed and well-nourished. No distress.   HENT:   Head: Normocephalic and atraumatic.   Eyes: EOM are normal. No scleral icterus.   Neck: Normal range of motion. Neck supple. No JVD present.   Cardiovascular: Normal rate, regular rhythm and normal heart sounds.    Pulmonary/Chest: Effort normal and breath sounds normal. No respiratory distress.   Abdominal: Soft. Bowel sounds are normal. He exhibits no distension.   Musculoskeletal: Normal range of motion. He exhibits no edema.   Neurological: He is alert.   Skin: Skin is warm and dry. No rash noted.   Psychiatric: He has a normal mood and affect.         Results Review:       I reviewed the patient's new clinical results.    Results from last 7 days  Lab  Units 01/01/18  0739 12/30/17  1417   WBC 10*3/mm3 11.51* 11.33*   HEMOGLOBIN g/dL 11.6* 11.1*   PLATELETS 10*3/mm3 244 290       Results from last 7 days  Lab Units 12/30/17  1417   SODIUM mmol/L 140   POTASSIUM mmol/L 4.4   CHLORIDE mmol/L 104   CO2 mmol/L 24.9   BUN mg/dL 25*   CREATININE mg/dL 1.14   CALCIUM mg/dL 8.6   Estimated Creatinine Clearance: 56.5 mL/min (by C-G formula based on Cr of 1.14).      Assessment/Plan   Principal Problem:    Generalized weakness  Active Problems:    Alzheimer's type dementia    Essential hypertension    Hypothyroidism    Parkinson's disease    Gait instability    Pneumonia      PLAN  - continue zosyn for pneumonia.    - confusion and lethargy today likely secondary  to metabolic encephalopathy, labs and ABG reviewed  - follow PT and SLP evals    Disposition        Wilbert Hicks MD  [facility]  01/01/18  8:09 AM

## 2018-01-01 NOTE — THERAPY EVALUATION
Acute Care - Speech Language Pathology   Swallow Initial Evaluation [facility]     Patient Name: Alexandro Hallman  : 1940  MRN: [id number]  Today's Date: 2018  Onset of Illness/Injury or Date of Surgery Date: 17            Admit Date: 2017     SPEECH-LANGUAGE PATHOLOGY EVALUATION - SWALLOW  Subjective: The patient was seen on this date for a Clinical Swallow evaluation.  Patient was alert and cooperative.    The patient's history is significant for Parkinson's Disease, Dementia, PNA.   Objective: Textures given included thin liquid, nectar thick liquid, puree consistency, mechanical soft consistency and regular consistency.  Assessment: Difficulties were noted with regular consistency, characterized by coughing.  SLP Findings:  Patient presents with r/o oropharyngeal dysphagia, without esophageal component.   Recommendations: Diet Textures: thin liquid, mechanical soft consistency food.  Medications should be taken whole or crushed with puree. May have water and ice between meals after oral care, under staff or family supervision and with the recommended strategies for safe swallowing.   Recommended Strategies: Upright for PO and small bites and sips. Oral care before breakfast, after all meals and PRN.  Other Recommended Evaluations: VFSS    Dysphagia therapy is recommended. Rationale: Tolerate least restrictive diet.    Visit Dx:     ICD-10-CM ICD-9-CM   1. Generalized weakness R53.1 780.79   2. 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   • Arthropathy of pelvic region and thigh    • BPH (benign prostatic hyperplasia)    • Bursitis    • Cholelithiasis 03/27/2008   • Chronic constipation    • Colonic polyp    • Communicating hydrocephalus    • Constipation    • Contusion of thigh, left 02/19/2014   • COPD (chronic obstructive pulmonary disease)    • DDD (degenerative disc disease), cervical    • Dementia     08/14/2012   • Depression 05/02/2012   • Diverticulosis    • Eczema    • Glossitis 09/17/2009   • Hypertension 12/03/2007   • Hypothyroidism 03/14/2011   • Ingrown nail 12/2007   • Lumbar stenosis    • Lumbar stenosis    • Pneumonia    • Right shoulder tendonitis 02/29/2014   • Shortness of breath      Past Surgical History:   Procedure Laterality Date   • BACK SURGERY     • CATARACT EXTRACTION, BILATERAL     • COLONOSCOPY  11/04/2011   • FOOT SURGERY Left 1970    spur repair   • FOOT SURGERY Right 1993    spur   • HERNIA REPAIR  1969    double hernia repair   • KNEE SURGERY  1967    Left Knee   • SPINE SURGERY  1997    disc repair          SWALLOW EVALUATION (last 72 hours)      Swallow Evaluation       01/01/18 1447                Clinical Impression    Patient's Goals For Discharge patient did not state  -OC        SLP Swallowing Diagnosis oral dysfunction;pharyngeal dysfunction  -OC        Rehab Potential/Prognosis, Swallowing good, to achieve stated therapy goals  -OC        Criteria for Skilled Therapeutic Interventions Met skilled criteria for dysphagia intervention met  -OC        FCM, Swallowing 4-->Level 4  -OC        Therapy Frequency PRN  -OC        Predicted Duration Therapy Interv (days) until discharge  -OC        Expected Duration Therapy Session (min) 15-30 minutes  -OC        SLP Diet Recommendation soft textures;ground;thin liquids  -OC        Recommended Diagnostics reassess via VFSS (MBS)  -OC         Recommended Feeding/Eating Techniques alternate between small bites and sips of food/liquid;maintain upright posture during/after eating for 30 mins;small sips/bites  -OC        SLP Rec. for Method of Medication Administration meds whole in pudding/applesauce;meds crushed in pudding/applesauce  -OC        Monitor For Signs Of Aspiration cough;elevated WBC count;gurgly voice;throat clearing  -OC        Anticipated Discharge Disposition other (see comments)   unknown  -OC        Pain Assessment    Pain Assessment No/denies pain  -OC        Cognitive Assessment/Intervention    Current Cognitive/Communication Assessment impaired  -OC        Oral Motor Structure and Function    Oral Motor Anatomy and Physiology patient demonstrates anatomy and physiology that is WNL  -OC        Dentition Assessment upper dentures/partial in place;lower dentures/partial in place  -OC        Secretion Management WNL/WFL  -OC        Mucosal Quality moist, healthy  -OC        Volitional Swallow no difficulties initiating volitional swallow  -OC        Volitional Cough no difficulties initiating volitional cough  -OC        Oral Musculature General Assessment WFL (within functional limits)  -OC          User Key  (r) = Recorded By, (t) = Taken By, (c) = Cosigned By    Initials Name Effective Dates    OC Faby Parra MA,[facility]-SLP 04/05/17 -         EDUCATION  The patient has been educated in the following areas:   Dysphagia (Swallowing Impairment) Modified Diet Instruction.    SLP Recommendation and Plan  SLP Swallowing Diagnosis: oral dysfunction, pharyngeal dysfunction  SLP Diet Recommendation: soft textures, ground, thin liquids  Recommended Feeding/Eating Techniques: alternate between small bites and sips of food/liquid, maintain upright posture during/after eating for 30 mins, small sips/bites  SLP Rec. for Method of Medication Administration: meds whole in pudding/applesauce, meds crushed in pudding/applesauce  Monitor For Signs Of  Aspiration: cough, elevated WBC count, gurgly voice, throat clearing  Recommended Diagnostics: reassess via VFSS ([facility])  Criteria for Skilled Therapeutic Interventions Met: skilled criteria for dysphagia intervention met  Anticipated Discharge Disposition: other (see comments) (unknown)  Rehab Potential/Prognosis, Swallowing: good, to achieve stated therapy goals  Therapy Frequency: PRN             Plan of Care Review  Plan Of Care Reviewed With: patient  Outcome Summary/Follow up Plan: Bedside swallow eval completed. Recommend [facility] soft and thins. Meds with puree. VFSS.          IP SLP Goals       01/01/18 1447          Safely Consume Diet    Safely Consume Diet- SLP, Date Established 01/01/18  -OC      Safely Consume Diet- SLP, Time to Achieve by discharge  -OC      Safely Consume Diet- SLP, Outcome goal ongoing  -OC        User Key  (r) = Recorded By, (t) = Taken By, (c) = Cosigned By    Initials Name Provider Type    SOTO Parra MA,CCC-SLP Speech and Language Pathologist             SLP Outcome Measures (last 72 hours)      SLP Outcome Measures       01/01/18 1447          SLP Outcome Measures    Outcome Measure Used? Adult NOMS  -OC      FCM Scores    FCM Chosen Swallowing  -OC      Swallowing FCM Score 4  -OC        User Key  (r) = Recorded By, (t) = Taken By, (c) = Cosigned By    Initials Name Effective Dates    SOTO Parra MA,[facility]-SLP 04/05/17 -            Time Calculation:         Time Calculation- SLP       01/01/18 1559          Time Calculation- SLP    SLP Start Time 1347  -OC      SLP Stop Time 1447  -OC      SLP Time Calculation (min) 60 min  -OC      SLP Received On 01/01/18  -OC        User Key  (r) = Recorded By, (t) = Taken By, (c) = Cosigned By    Initials Name Provider Type    SOTO Parra MA,CCC-SLP Speech and Language Pathologist          Therapy Charges for Today     Code Description Service Date Service Provider Modifiers Qty    [id number] HC ST EVAL ORAL PHARYNG SWALLOW 4 1/1/2018  Faby Parra MA,CCC-SLP GN 1               Faby Parra MA,[facility]-SLP  1/1/2018

## 2018-01-02 NOTE — PROGRESS NOTES
Discharge Planning Assessment  [facility]     Patient Name: Alexandro Hallman  MRN: [id number]  Today's Date: 1/2/2018    Admit Date: 12/30/2017          Discharge Needs Assessment     None            Discharge Plan       01/02/18 1033    Case Management/Social Work Plan    Additional Comments Spoke with the patient's spouse in CCP office and she would like for [facility] to start the pre-auth. Spoke with Disha/Kalli and she will start the pre-auth. and they will have a bed for the patient today pending pre-auth. Discharge packet started and on the chart. MEMO Katz RN, [facility].         Discharge Placement     Facility/Agency Request Status Selected? Address Phone Number Fax Number    [facility] Accepted    Yes [address] [facility] [address] [phone]        Nitish Nicole RN 12/31/2017 13:24    Email to Gregoria Nicole RN                 [facility] [place] Pending - Request Sent     [address] [facility] [address] [phone]        Nitish Nicole RN 12/31/2017 13:23    Email to Anaya Nicole RN                             Demographic Summary     None            Functional Status     None            Psychosocial     None            Abuse/Neglect     None            Legal     None            Substance Abuse     None            Patient Forms     None          Pili Katz, HARINI

## 2018-01-02 NOTE — MBS/VFSS/FEES
Acute Care - Speech Language Pathology   Swallow Initial Evaluation [facility]     Patient Name: Alexandro Hallman  : 1940  MRN: [id number]  Today's Date: 2018  Onset of Illness/Injury or Date of Surgery Date: 17            Admit Date: 2017  SPEECH-LANGUAGE PATHOLOGY EVALUTION - VFSS  Subjective: The patient was seen on this date for a VFSS(Videofluoroscopic Swallowing Study).  Patient was alert and cooperative.    Significant history: generalized weakness, Parkinson's Disease, and PNA  Objective: Risks/benefits were reviewed with the patient, and consent was obtained. The study was completed with SLP present and Radiologist review. The patient was seen in lateral view(s). Textures given included thin liquid, puree consistency and mechanical soft consistency.  Assessment: Difficulties were noted with thin liquid and mechanical soft consistency, characterized by inconsistent, largely transient penetration of thin liquids which happened about 25% of the time. No aspiration was noted. Pt presented with thins via cup and straw. Pt had slow, decreased mastication of mech soft, therefore, regular consistencies were not trialed.   The patient demonstrated no aspiration and silent penetration.  Noted with and thin liquid.  Residue was minimal on the tongue base and in valleculae.   SLP Findings: Patient presents with mild oropharyngeal dysphagia.   Comments: Monitor pt for fatigue and s/s of aspiration.  Recommendations: Diet Textures: thin liquid, mechanical soft consistency food. Medications should be taken whole with puree.   Recommended Strategies: Upright for PO, small bites and sips, may use straw and supervision with all PO. Oral care before breakfast, after all meals and PRN.    Dysphagia therapy is recommended. Rationale: diet tolerance.    Visit Dx:     ICD-10-CM ICD-9-CM   1. Generalized weakness R53.1 780.79   2. Difficulty walking R26.2 719.7     Patient Active Problem List   Diagnosis    • Alzheimer's type dementia   • Central spinal stenosis   • Essential hypertension   • Hypothyroidism   • Benign prostatic hypertrophy (BPH) with incomplete bladder emptying   • Communicating hydrocephalus   • Parkinson's disease   • Gait instability   • Weakness   • Hallucinations, unspecified   • Generalized weakness   • Pneumonia   • Acute metabolic encephalopathy     Past Medical History:   Diagnosis Date   • Abnormal TSH 12/2008   • Alzheimer's dementia    • Anorexia 02/28/2008   • Arthritis    • Arthritis of shoulder 04/01/2014   • Arthropathy of pelvic region and thigh    • BPH (benign prostatic hyperplasia)    • Bursitis    • Cholelithiasis 03/27/2008   • Chronic constipation    • Colonic polyp    • Communicating hydrocephalus    • Constipation    • Contusion of thigh, left 02/19/2014   • COPD (chronic obstructive pulmonary disease)    • DDD (degenerative disc disease), cervical    • Dementia     08/14/2012   • Depression 05/02/2012   • Diverticulosis    • Eczema    • Glossitis 09/17/2009   • Hypertension 12/03/2007   • Hypothyroidism 03/14/2011   • Ingrown nail 12/2007   • Lumbar stenosis    • Lumbar stenosis    • Pneumonia    • Right shoulder tendonitis 02/29/2014   • Shortness of breath      Past Surgical History:   Procedure Laterality Date   • BACK SURGERY     • CATARACT EXTRACTION, BILATERAL     • COLONOSCOPY  11/04/2011   • FOOT SURGERY Left 1970    spur repair   • FOOT SURGERY Right 1993    spur   • HERNIA REPAIR  1969    double hernia repair   • KNEE SURGERY  1967    Left Knee   • SPINE SURGERY  1997    disc repair          SWALLOW EVALUATION (last 72 hours)      Swallow Evaluation       01/02/18 1500 01/01/18 1447             Rehab Evaluation    Document Type evaluation  -AW        Patient Effort, Rehab Treatment good  -AW        Symptoms Noted During/After Treatment none  -AW        General Information    Patient Profile Review yes  -AW        Current Diet Limitations mechanical soft;thin  liquids  -AW        Prior Level of Function- Swallowing no diet consistency restrictions  -AW        Plans/Goals Discussed With patient  -AW        Barriers to Rehab none identified  -AW        Clinical Impression    Patient's Goals For Discharge patient did not state  -AW patient did not state  -OC       SLP Swallowing Diagnosis oral dysfunction;pharyngeal dysfunction  -AW oral dysfunction;pharyngeal dysfunction  -OC       Rehab Potential/Prognosis, Swallowing good, to achieve stated therapy goals  -AW good, to achieve stated therapy goals  -OC       Criteria for Skilled Therapeutic Interventions Met skilled criteria for dysphagia intervention met  -AW skilled criteria for dysphagia intervention met  -OC       FCM, Swallowing 4-->Level 4  -AW 4-->Level 4  -OC       Therapy Frequency PRN  -AW PRN  -OC       Predicted Duration Therapy Interv (days) until discharge  -AW until discharge  -OC       Expected Duration Therapy Session (min) 15-30 minutes  -AW 15-30 minutes  -OC       SLP Diet Recommendation soft textures;ground;thin liquids  -AW soft textures;ground;thin liquids  -OC       Recommended Diagnostics  reassess via VFSS (MBS)  -OC       Recommended Feeding/Eating Techniques maintain upright posture during/after eating for 30 mins;small sips/bites  -AW alternate between small bites and sips of food/liquid;maintain upright posture during/after eating for 30 mins;small sips/bites  -OC       SLP Rec. for Method of Medication Administration meds whole in pudding/applesauce  -AW meds whole in pudding/applesauce;meds crushed in pudding/applesauce  -OC       Monitor For Signs Of Aspiration cough;elevated WBC count;gurgly voice;throat clearing;fever;upper respiratory infection;pneumonia;right lower lobe infiltrates  -AW cough;elevated WBC count;gurgly voice;throat clearing  -OC       Anticipated Discharge Disposition skilled nursing facility  -AW other (see comments)   unknown  -OC       Pain Assessment    Pain  Assessment  No/denies pain  -OC       Cognitive Assessment/Intervention    Current Cognitive/Communication Assessment impaired  -AW impaired  -OC       Orientation Status oriented to;person;place  -AW        Follows Commands/Answers Questions 75% of the time;able to follow single-step instructions  -AW        Personal Safety decreased awareness, need for assist;decreased awareness, need for safety  -AW        Personal Safety Interventions fall prevention program maintained  -AW        Oral Motor Structure and Function    Oral Motor Anatomy and Physiology patient demonstrates anatomy that is WNL  -AW patient demonstrates anatomy and physiology that is WNL  -OC       Dentition Assessment upper dentures/partial in place;lower dentures/partial in place  -AW upper dentures/partial in place;lower dentures/partial in place  -OC       Secretion Management WNL/WFL  -AW WNL/WFL  -OC       Mucosal Quality  moist, healthy  -OC       Volitional Swallow  no difficulties initiating volitional swallow  -OC       Volitional Cough  no difficulties initiating volitional cough  -OC       Oral Musculature General Assessment WFL (within functional limits)  -AW WFL (within functional limits)  -OC         User Key  (r) = Recorded By, (t) = Taken By, (c) = Cosigned By    Initials Name Effective Dates    OC Faby Parra MA,CCC-SLP 04/05/17 -     AW Sonal Shah, MS CCC-SLP 04/13/15 -         EDUCATION  The patient has been educated in the following areas:   Dysphagia (Swallowing Impairment) Oral Care/Hydration.    SLP Recommendation and Plan  SLP Swallowing Diagnosis: oral dysfunction, pharyngeal dysfunction  SLP Diet Recommendation: soft textures, ground, thin liquids  Recommended Feeding/Eating Techniques: maintain upright posture during/after eating for 30 mins, small sips/bites  SLP Rec. for Method of Medication Administration: meds whole in pudding/applesauce  Monitor For Signs Of Aspiration: cough, elevated WBC count, gurgly voice,  throat clearing, fever, upper respiratory infection, pneumonia, right lower lobe infiltrates     Criteria for Skilled Therapeutic Interventions Met: skilled criteria for dysphagia intervention met  Anticipated Discharge Disposition: skilled nursing facility  Rehab Potential/Prognosis, Swallowing: good, to achieve stated therapy goals  Therapy Frequency: PRN                        IP SLP Goals       01/01/18 1447          Safely Consume Diet    Safely Consume Diet- SLP, Date Established 01/01/18  -OC      Safely Consume Diet- SLP, Time to Achieve by discharge  -OC      Safely Consume Diet- SLP, Outcome goal ongoing  -OC        User Key  (r) = Recorded By, (t) = Taken By, (c) = Cosigned By    Initials Name Provider Type    SOTO Parra MA,CCC-SLP Speech and Language Pathologist             SLP Outcome Measures (last 72 hours)      SLP Outcome Measures       01/02/18 1500 01/01/18 1447       SLP Outcome Measures    Outcome Measure Used? Adult NOMS  -AW Adult NOMS  -OC     FCM Scores    FCM Chosen Swallowing  -AW Swallowing  -OC     Swallowing FCM Score 4  -AW 4  -OC       User Key  (r) = Recorded By, (t) = Taken By, (c) = Cosigned By    Initials Name Effective Dates    SOTO Parra MA,CCC-SLP 04/05/17 -     AW Sonal Shah MS CCC-SLP 04/13/15 -            Time Calculation:         Time Calculation- SLP       01/02/18 1509          Time Calculation- SLP    SLP Start Time 1415  -AW      SLP Stop Time 1515  -AW      SLP Time Calculation (min) 60 min  -AW      SLP Received On 01/02/18  -AW        User Key  (r) = Recorded By, (t) = Taken By, (c) = Cosigned By    Initials Name Provider Type    PRATIK Shah MS CCC-SLP Speech and Language Pathologist          Therapy Charges for Today     Code Description Service Date Service Provider Modifiers Qty    [id number] HC ST MOTION FLUORO EVAL SWALLOW 4 1/2/2018 Sonal Shah MS CCC-SLP GN 1               Sonal Shah MS CCC-ZANDER  1/2/2018

## 2018-01-02 NOTE — DISCHARGE SUMMARY
Date of Admission: 12/30/2017  Date of Discharge:  1/2/2018    PCP: Buster Howe MD      DISCHARGE DIAGNOSIS  Principal Problem:    Generalized weakness  Active Problems:    Alzheimer's type dementia    Essential hypertension    Hypothyroidism    Parkinson's disease    Gait instability    Pneumonia    Acute metabolic encephalopathy      SECONDARY DIAGNOSES  Past Medical History:   Diagnosis Date   • Abnormal TSH 12/2008   • Alzheimer's dementia    • Anorexia 02/28/2008   • Arthritis    • Arthritis of shoulder 04/01/2014   • Arthropathy of pelvic region and thigh    • BPH (benign prostatic hyperplasia)    • Bursitis    • Cholelithiasis 03/27/2008   • Chronic constipation    • Colonic polyp    • Communicating hydrocephalus    • Constipation    • Contusion of thigh, left 02/19/2014   • COPD (chronic obstructive pulmonary disease)    • DDD (degenerative disc disease), cervical    • Dementia     08/14/2012   • Depression 05/02/2012   • Diverticulosis    • Eczema    • Glossitis 09/17/2009   • Hypertension 12/03/2007   • Hypothyroidism 03/14/2011   • Ingrown nail 12/2007   • Lumbar stenosis    • Lumbar stenosis    • Pneumonia    • Right shoulder tendonitis 02/29/2014   • Shortness of breath        CONSULTS   Consults     Date and Time Order Name Status Description    12/30/2017 1514 [facility] (on-call MD unless specified) Completed           PROCEDURES PERFORMED  XR Chest PA & Lateral   Final Result      FL Video Swallow    (Results Pending)         HOSPITAL COURSE  Patient is a 77 y.o. male presented to [facility] complaining of weakness and lethargy.  Please see the admitting history and physical for further details.  He was admitted initially with unknown etiology for his symptoms and after XRay was done he was found to have a left lower lobe pneumonia.  He was started on Zosyn for both community-acquired coverage as well as aspiration pneumonia coverage.  Given his history of Parkinson's disease speech  "therapy was consulted to evaluate his swallow.  He was started on mechanical soft diet and thin liquids and they recommended a video swallow study.  He got 48 hours of Zosyn here in the hospital has improved.  He did have an episode where he got extremely lethargic and had increased confusion consistent with metabolic encephalopathy likely secondary to the underlying pneumonia.  This is resolved at the present time.  He is confused at baseline with his underlying dementia but has quickly improved.  He remains weak and fatigued and physical therapy as recommended transfer to skilled nursing facility for further management and evaluation.  At this point is to transition oral antibiotics.  He shown adequate signs of improvement and following a video swallow study today he'll be ready for discharge to skilled nursing facility for further care and rehabilitation.  CONDITION ON DISCHARGE  Stable.      VITAL SIGNS  /72  Pulse 65  Temp 96.8 °F (36 °C) (Oral)   Resp 16  Ht 190.5 cm (75\")  Wt 74.8 kg (165 lb)  SpO2 93%  BMI 20.62 kg/m2  Objective:  General Appearance:  Comfortable.    Vital signs: (most recent): Blood pressure 162/72, pulse 65, temperature 96.8 °F (36 °C), temperature source Oral, resp. rate 16, height 190.5 cm (75\"), weight 74.8 kg (165 lb), SpO2 93 %.  Vital signs are normal.    Output: Producing urine.    HEENT: Normal HEENT exam.    Lungs:  Normal respiratory rate and normal effort.  Breath sounds clear to auscultation.    Heart: Normal rate.  Regular rhythm.  S1 normal and S2 normal.    Abdomen: Abdomen is soft.  Bowel sounds are normal.   There is no abdominal tenderness.     Extremities: Normal range of motion.  There is no deformity or dependent edema.    Pulses: Distal pulses are intact.    Neurological: Patient is alert.                DISCHARGE DISPOSITION   Skilled Nursing Facility (DC - External)      DISCHARGE MEDICATIONS   Alexandro Hallman   Home Medication Instructions " ZEE:[id number]    Printed on:01/02/18 1146   Medication Information                      amLODIPine (NORVASC) 2.5 MG tablet  Take 1 tablet by mouth Daily.             amoxicillin-clavulanate (AUGMENTIN) 875-125 MG per tablet  Take 1 tablet by mouth Every 12 (Twelve) Hours for 12 doses. Indications: Pneumonia             aspirin 81 MG chewable tablet  Chew 81 mg daily.             cholecalciferol (VITAMIN D3) 1000 UNITS tablet  Take 2,000 Units by mouth 2 (Two) Times a Day.             donepezil (ARICEPT) 23 MG tablet  Take 23 mg by mouth Every Night.             isosorbide mononitrate (IMDUR) 30 MG 24 hr tablet  Take 1 tablet by mouth Daily.             levothyroxine (SYNTHROID, LEVOTHROID) 50 MCG tablet  Take 1 tablet by mouth Daily for 180 days.             memantine (NAMENDA XR) 28 MG capsule sustained-release 24 hr extended release capsule  Take 28 mg by mouth Daily.             mirtazapine (REMERON) 30 MG tablet  Take 30 mg by mouth Daily.             polyethylene glycol (GLYCOLAX) powder  Take 17 g by mouth As Needed.             pramipexole (MIRAPEX) 0.125 MG tablet  Take 0.125 mg by mouth 3 (Three) Times a Day.             risperiDONE (risperDAL) 1 MG tablet  Take 1 mg by mouth Every Night.             tamsulosin (FLOMAX) 0.4 MG capsule 24 hr capsule  Take 1 capsule by mouth Every Night for 180 days.             Umeclidinium-Vilanterol (ANORO ELLIPTA) 62.5-25 MCG/INH aerosol powder   Inhale 1 puff Daily.               Diet Instructions     Diet: Regular; Thin       Discharge Diet:  Regular   Fluid Consistency:  Thin              Activity Instructions     Activity as Tolerated                 Future Appointments  Date Time Provider Department Center   1/10/2018 3:00 PM MD AMANDA Tay [id number] None   11/29/2018 12:40 PM MD AMANDA Pereyra CD [id number] None     Additional Instructions for the Follow-ups that You Need to Schedule     Discharge Follow-up with PCP    As directed    Follow Up Details:   2-4 weeks                 Follow-up Information     Follow up with [facility] .    Specialties:  Assisted Living Facility, Skilled Nursing Facility, Intermediate Care Facility    Contact information:    [address] [facility] [address] [phone]        Follow up with Buster Howe MD .    Specialty:  Family Medicine    Why:  2-4 weeks    Contact information:    [address] [facility] [address]  [facility] [id number] [phone]            TEST  RESULTS PENDING AT DISCHARGE         Wilbert Hicks MD  [facility]  01/02/18  11:41 AM      Time: greater than 30 minutes.

## 2018-01-02 NOTE — PROGRESS NOTES
[address]    Physicians Statement of Medical Necessity for Ambulance Transportation    It is medically necessary for:    Patient Name: Alexandro Hallman    Insurance Information:  [employer] Medicare replacement #[id number]    [facility] 1[facility]2/18 @ 17:30     From (if nursing facility, specify level of care: skilled, assisted, etc): [facility]    To (specify level of care if nursing facility): [facility]    Date of Service: 12/30/17---1/2/18    For dialysis patients state date dialysis began: N/A    Diagnosis: Generalized weakness    Past Medical/Surgical History:  Past Medical History:   Diagnosis Date   • Abnormal TSH 12/2008   • Alzheimer's dementia    • Anorexia 02/28/2008   • Arthritis    • Arthritis of shoulder 04/01/2014   • Arthropathy of pelvic region and thigh    • BPH (benign prostatic hyperplasia)    • Bursitis    • Cholelithiasis 03/27/2008   • Chronic constipation    • Colonic polyp    • Communicating hydrocephalus    • Constipation    • Contusion of thigh, left 02/19/2014   • COPD (chronic obstructive pulmonary disease)    • DDD (degenerative disc disease), cervical    • Dementia     08/14/2012   • Depression 05/02/2012   • Diverticulosis    • Eczema    • Glossitis 09/17/2009   • Hypertension 12/03/2007   • Hypothyroidism 03/14/2011   • Ingrown nail 12/2007   • Lumbar stenosis    • Lumbar stenosis    • Pneumonia    • Right shoulder tendonitis 02/29/2014   • Shortness of breath       Past Surgical History:   Procedure Laterality Date   • BACK SURGERY     • CATARACT EXTRACTION, BILATERAL     • COLONOSCOPY  11/04/2011   • FOOT SURGERY Left 1970    spur repair   • FOOT SURGERY Right 1993    spur   • HERNIA REPAIR  1969    double hernia repair   • KNEE SURGERY  1967    Left Knee   • SPINE SURGERY  1997    disc repair        Current Objective Medical Evidence(including physical exam finding  to support reason for limitations):    Immobilization syndrome    Other: Generalized weakness        Physician Signature:           (RN,NP,PA,CAN, Discharge Planner) Date/Time: 1/2/18 @ 17:30     Printed Name:    __________________________________    [facility]   Phone: [phone] Phone: [phone] Phone: [phone]   Fax: [phone] Fax: [phone] Fax: [phone]

## 2018-01-02 NOTE — PROGRESS NOTES
Case Management Discharge Note    Final Note: Mario, skilled bed, by [facility] on 1/2/18    Discharge Placement     Facility/Agency Request Status Selected? Address Phone Number Fax Number    [facility] Accepted    Yes [address] [facility] [address] [phone]        Nitish Nicole RN 12/31/2017 13:24    Email to Gregoria Nicole RN                 [facility] [place] Pending - Request Sent     [address] [facility] [address] [phone]        Nitish Nicole RN 12/31/2017 13:23    Email to Anaya Nicole RN                     [facility]    Discharge Codes: 03  Discharged/transferred to skilled nursing facility (SNF) with Medicare certification in anticipation of skilled care

## 2018-01-02 NOTE — PROGRESS NOTES
Discharge Planning Assessment  [facility]     Patient Name: Alexandro Hallman  MRN: [id number]  Today's Date: 1/2/2018    Admit Date: 12/30/2017          Discharge Needs Assessment     None            Discharge Plan       01/02/18 1156    Case Management/Social Work Plan    Additional Comments [facility] 17:30 today for transfer. Notified patient's spouse and they are in agreement with the discharge plans. MEMO Katz RN, CCP.     Final Note    Final Note Kalli, skilled bed, [facility] on 1/2/18 01/02/18 1033    Case Management/Social Work Plan    Additional Comments Spoke with the patient's spouse in CCP office and she would like for [facility] to start the pre-auth. Spoke with Disha/Kalli and she will start the pre-auth. and they will have a bed for the patient today pending pre-auth. Discharge packet started and on the chart. MEMO Katz RN, CCP.         Discharge Placement     Facility/Agency Request Status Selected? Address Phone Number Fax Number    [facility] Accepted    Yes [address] [facility] [address] [phone]        Nitish Nicole RN 12/31/2017 13:24    Email to Gregoria Nicole RN                 [facility] [place] Pending - Request Sent     [address] [facility] [address] [phone]        Nitish Nicole RN 12/31/2017 13:23    Email to Anaya Nicole RN                     Expected Discharge Date and Time     Expected Discharge Date Expected Discharge Time    Jan 2, 2018               Demographic Summary     None            Functional Status     None            Psychosocial     None            Abuse/Neglect     None            Legal     None            Substance Abuse     None            Patient Forms     None          Pili Katz, HARINI

## 2018-01-02 NOTE — PROGRESS NOTES
Case Management Discharge Note    Final Note: Mario, skilled bed[facility] on 1/2/18    Discharge Placement     Facility/Agency Request Status Selected? Address Phone Number Fax Number    [facility] Accepted    Yes [address] [facility] [address] [phone]        Nitish Nicole RN 12/31/2017 13:24    Email to Gregoria Nicole RN                 [facility] [place] Pending - Request Sent     [address] [facility] [address] [phone]        Nitish Nicole RN 12/31/2017 13:23    Email to Anaya Nicole RN                          Discharge Codes: 03  Discharged/transferred to skilled nursing facility (SNF) with Medicare certification in anticipation of skilled care

## 2018-01-03 PROBLEM — R41.82 ALTERED MENTAL STATUS: Status: ACTIVE | Noted: 2018-01-01

## 2018-01-04 NOTE — H&P
Internal medicine history and physical    INTERNAL MEDICINE   [facility]       Patient Identification:  Name: Alexandro Hallman  Age: 77 y.o.  Sex: male  :  1940  MRN: [id number]                   Primary Care Physician: Buster Howe MD                                   Chief Complaint:  Brought back from rehabilitation facility after a day over the years since his discharge from this hospital on 2018    History of Present Illness:   Patient is a 77-year-old male who has history of communicating hydrocephalus mild Parkinson's disease for the last couple years underlying dementia of Alzheimer's type and was in fact hallucinating in July as per Dr. Howe's note was otherwise getting along okay until a week before last Saturday when he started to decline in terms of mobility, episodes of somnolence and decreased intake in those episodes.  He reached a point that he was not very interactive and weak and could not walk and was admitted on 2017 to this facility.  Patient was diagnosed to have left-sided pneumonia and was treated with Zosyn and later switched to Augmentin and was discharged to the rehabilitation facility for further care.  According to the patient's wife his mobility and function did not improve but he did not deteriorate either.  He did have an episode during the hospitalization where he was very somnolent and not interactive and difficult to arouse and workup did not reveal any specific abnormality.  Patient got better on his arm after that episode.  According to the patient's wife after he left the hospital on Tuesday, 2018  MrOtoniel somewhere in the evening and was able to have his dinner without any problem.  She left him doing okay and then went to see him back at 9:30 in the morning on January 3, 2018 and she found him unresponsive and somnolent and unable to be aroused.  It then until 4:00 S less than she decided to bring him to the emergency room for  further evaluation.  Patient according to her did not eat or drink anything and did not take any medications since then.  In the emergency room workup did reveal mild leukocytosis.  Chest x-ray showed left-sided persistent infiltrate and CT scan of the head did not show any acute intracranial abnormality.  She was given IV fluids and IV Zosyn and this morning according to the wife he somewhat better in terms of being able to talk better and interactive better.  He still very weak.  Patient wife is still unable to comprehend that in the context of underlying Parkinson's disease progressive dementia spinal stenosis and communicating hydrocephalus and known gait instability why he has declined so fast.  She is also not impressed with the rehabilitation facility that she went to and she is adamant this she should be able to take him home with proper health arranged so that he can be taken care of at home.  She doesn't recall him having any fever or chills.    Past Medical History:  Past Medical History:   Diagnosis Date   • Abnormal TSH 12/2008   • Alzheimer's dementia    • Anorexia 02/28/2008   • Arthritis    • Arthritis of shoulder 04/01/2014   • Arthropathy of pelvic region and thigh    • BPH (benign prostatic hyperplasia)    • Bursitis    • Cholelithiasis 03/27/2008   • Chronic constipation    • Colonic polyp    • Communicating hydrocephalus    • Constipation    • Contusion of thigh, left 02/19/2014   • COPD (chronic obstructive pulmonary disease)    • DDD (degenerative disc disease), cervical    • Dementia     08/14/2012   • Depression 05/02/2012   • Diverticulosis    • Eczema    • Glossitis 09/17/2009   • Hypertension 12/03/2007   • Hypothyroidism 03/14/2011   • Ingrown nail 12/2007   • Lumbar stenosis    • Lumbar stenosis    • Pneumonia    • Right shoulder tendonitis 02/29/2014   • Shortness of breath      Past Surgical History:  Past Surgical History:   Procedure Laterality Date   • BACK SURGERY     • CATARACT  EXTRACTION, BILATERAL     • COLONOSCOPY  11/04/2011   • FOOT SURGERY Left 1970    spur repair   • FOOT SURGERY Right 1993    spur   • HERNIA REPAIR  1969    double hernia repair   • KNEE SURGERY  1967    Left Knee   • SPINE SURGERY  1997    disc repair      Home Meds:  Prescriptions Prior to Admission   Medication Sig Dispense Refill Last Dose   • amLODIPine (NORVASC) 2.5 MG tablet Take 1 tablet by mouth Daily. 30 tablet 3    • amoxicillin-clavulanate (AUGMENTIN) 875-125 MG per tablet Take 1 tablet by mouth Every 12 (Twelve) Hours for 12 doses. Indications: Pneumonia 12 tablet 0    • aspirin 81 MG chewable tablet Chew 81 mg daily.   Taking   • cholecalciferol (VITAMIN D3) 1000 UNITS tablet Take 2,000 Units by mouth 2 (Two) Times a Day.   Taking   • donepezil (ARICEPT) 23 MG tablet Take 23 mg by mouth Every Night.   Taking   • isosorbide mononitrate (IMDUR) 30 MG 24 hr tablet Take 1 tablet by mouth Daily. 30 tablet 3    • levothyroxine (SYNTHROID, LEVOTHROID) 50 MCG tablet Take 1 tablet by mouth Daily for 180 days. 90 tablet 1 Taking   • memantine (NAMENDA XR) 28 MG capsule sustained-release 24 hr extended release capsule Take 28 mg by mouth Daily.      • mirtazapine (REMERON) 30 MG tablet Take 30 mg by mouth Daily.   Taking   • polyethylene glycol (GLYCOLAX) powder Take 17 g by mouth As Needed.   Taking   • pramipexole (MIRAPEX) 0.125 MG tablet Take 0.125 mg by mouth 3 (Three) Times a Day.      • risperiDONE (risperDAL) 1 MG tablet Take 1 mg by mouth Every Night.   Taking   • tamsulosin (FLOMAX) 0.4 MG capsule 24 hr capsule Take 1 capsule by mouth Every Night for 180 days. 90 capsule 1 Taking   • Umeclidinium-Vilanterol (ANORO ELLIPTA) 62.5-25 MCG/INH aerosol powder  Inhale 1 puff Daily.   Taking     Current Meds:     Current Facility-Administered Medications:   •  acetaminophen (TYLENOL) tablet 650 mg, 650 mg, Oral, Q4H PRN, Prince Henao MD  •  amLODIPine (NORVASC) tablet 2.5 mg, 2.5 mg, Oral, Daily, Prince  CECELIA Henao MD  •  aspirin chewable tablet 81 mg, 81 mg, Oral, Daily, Prince Henao MD  •  cholecalciferol (VITAMIN D3) tablet 2,000 Units, 2,000 Units, Oral, Daily, Prince Henao MD  •  dextrose 5 % and sodium chloride 0.9 % infusion, 125 mL/hr, Intravenous, Continuous, Jawheriberto Soriano MD, Last Rate: 125 mL/hr at 01/04/18 0934, 125 mL/hr at 01/04/18 0934  •  donepezil (ARICEPT) tablet 20 mg, 20 mg, Oral, Nightly, Prince Henao MD  •  enoxaparin (LOVENOX) syringe 40 mg, 40 mg, Subcutaneous, Nightly, Prince Henao MD, 40 mg at 01/04/18 0245  •  isosorbide mononitrate (IMDUR) 24 hr tablet 30 mg, 30 mg, Oral, Daily, Prince Henao MD  •  levothyroxine (SYNTHROID, LEVOTHROID) tablet 50 mcg, 50 mcg, Oral, Q AM, Prince Henao MD  •  memantine (NAMENDA) tablet 10 mg, 10 mg, Oral, Q12H, Prince Henao MD  •  mirtazapine (REMERON) tablet 30 mg, 30 mg, Oral, Nightly, Prince Henao MD  •  Pharmacy to Dose Zosyn, , Does not apply, Continuous PRN, Prince Henao MD  •  piperacillin-tazobactam (ZOSYN) 3.375 g in iso-osmotic dextrose 50 ml (premix), 3.375 g, Intravenous, Q8H, Prince Henao MD, 3.375 g at 01/04/18 0455  •  polyethylene glycol (MIRALAX) powder 17 g, 17 g, Oral, Daily PRN, Prince Henao MD  •  risperiDONE (risperDAL) tablet 1 mg, 1 mg, Oral, Nightly, Prince Henao MD  •  sodium chloride 0.9 % flush 1-10 mL, 1-10 mL, Intravenous, PRN, Prince Henao MD  •  Insert peripheral IV, , , Once **AND** sodium chloride 0.9 % flush 10 mL, 10 mL, Intravenous, PRN, Geoff Hill MD  •  sodium chloride 0.9 % flush 10 mL, 10 mL, Intravenous, PRN, Prince Henao MD  •  tamsulosin (FLOMAX) 24 hr capsule 0.4 mg, 0.4 mg, Oral, Daily, Prince Henao MD  Allergies:  Allergies   Allergen Reactions   • Factive [Gemifloxacin]    • Fluticasone-Salmeterol    • Cefdinir Rash     Tolerates penicillins     Social History:   Social History   Substance Use Topics   • Smoking status:  "Former Smoker     Types: Cigarettes     Quit date: 4/20/2017   • Smokeless tobacco: Never Used   • Alcohol use No      Family History:  Family History   Problem Relation Age of Onset   • Heart disease Mother    • Liver cancer Mother    • Hypertension Mother    • Cancer Mother    • Coronary artery disease Father    • Dementia Father    • Heart disease Father    • Stroke Maternal Grandfather    • Alzheimer's disease Other           Review of Systems  See history of present illness and past medical history.  Very limited as patient is not very interactive.  Per wife please see above in the history of presenting illness.      Vitals:   /91 (BP Location: Left arm, Patient Position: Lying)  Pulse 64  Temp 98.1 °F (36.7 °C) (Oral)   Resp 20  Ht 190.5 cm (75\")  Wt 72.8 kg (160 lb 6.4 oz)  SpO2 91%  BMI 20.05 kg/m2  I/O:   Intake/Output Summary (Last 24 hours) at 01/04/18 0959  Last data filed at 01/04/18 0933   Gross per 24 hour   Intake            632.5 ml   Output              700 ml   Net            -67.5 ml     Exam:  General Appearance:    Somnolent but with effort does answer simple questions appropriately and tries to follow commands.  Typical flat affect facies    Head:    Normocephalic, Typical flat affect facies.  without obvious abnormality, atraumatic   Eyes:    PERRL, conjunctiva/corneas clear, EOM's intact, both eyes   Ears:    Normal external ear canals, both ears   Nose:   Nares normal, septum midline, mucosa normal, no drainage    or sinus tenderness   Throat:   Lips, tongue, gums normal; oral mucosa pink and moist   Neck:   Supple, symmetrical, trachea midline, no adenopathy;     thyroid:  no enlargement/tenderness/nodules; no carotid    bruit or JVD   Back:     Symmetric, no curvature, ROM normal, no CVA tenderness   Lungs:     Decreased breath sounds at the base    Chest Wall:    No tenderness or deformity    Heart:    Regular rate and rhythm, S1 and S2 normal, no murmur, rub   or gallop "   Abdomen:     Soft, non-tender, bowel sounds active all four quadrants,     no masses, no hepatomegaly, no splenomegaly   Extremities:   Extremities normal, atraumatic, no cyanosis or edema   Pulses:   Pulses palpable in all extremities; symmetric all extremities   Skin:   Skin color normal, Skin is warm and dry,  no rashes or palpable lesions   Neurologic:   CNII-XII intact, motor strength grossly intact, sensation grossly intact to light touch, no focal deficits noted  No resting tremor noted but does have spastic rigidity involving upper and lower extremities.  He is also unable to sit up because of weakness and wants to be ruled out either site so that the back couldn't be examined.     Data Review:      I reviewed the patient's new clinical results.    Results from last 7 days  Lab Units 01/04/18  0553 01/03/18 1932 01/02/18  0658 01/01/18  0739 12/30/17  1417   WBC 10*3/mm3 12.05* 11.62* 13.57* 11.51* 11.33*   HEMOGLOBIN g/dL 12.1* 11.8* 11.9* 11.6* 11.1*   PLATELETS 10*3/mm3 274 308 310 244 290       Results from last 7 days  Lab Units 01/04/18  0553 01/03/18  1932 01/02/18  0657 01/01/18  0739 12/30/17  1417   SODIUM mmol/L 135* 138 140 140 140   POTASSIUM mmol/L 3.8 3.9 3.6 3.6 4.4   CHLORIDE mmol/L 99 101 103 105 104   CO2 mmol/L 21.6* 25.2 24.7 21.5* 24.9   BUN mg/dL 22 19 14 15 25*   CREATININE mg/dL 1.03 1.09 0.98 0.93 1.14   CALCIUM mg/dL 8.4* 8.7 8.5* 8.1* 8.6   GLUCOSE mg/dL 66 72 79 81 79       Assessment:  Active Hospital Problems (** Indicates Principal Problem)    Diagnosis Date Noted   • **Altered mental status [R41.82] 01/03/2018   • Acute metabolic encephalopathy [G93.41] 01/01/2018   • Pneumonia [J18.9] 12/31/2017   • Generalized weakness [R53.1] 12/30/2017   • Parkinson's disease [G20] 09/13/2016   • Communicating hydrocephalus [G91.0] 07/12/2016   • Essential hypertension [I10] 01/11/2016   • Hypothyroidism [E03.9] 01/11/2016   • Central spinal stenosis [M48.00] 12/10/2015   • Alzheimer's  type dementia [G30.9] 11/18/2015      Resolved Hospital Problems    Diagnosis Date Noted Date Resolved   No resolved problems to display.       Plan:  · Episodic somnolence/altered mentation progressive immobility and decreased intake due to altered mental-etiology unclear    -metabolic encephalopathy due to pneumonia versus urinary tract infection versus  -dehydration versus    -effect of medication versus    -progression of underlying dementia and Parkinson's disease versus seizure with postictal effect.   -CVA  Plan: To keep him nothing by mouth continue with IV hydration and IV Zosyn for pneumonia check CT scan of the chest and abdomen to rule out underlying systemic or deep infection that is causing him to be acting that way and also to confirm the presence of left-sided pneumonia with recurrent aspiration.  We'll also get MRI of his brain and neurology consultation.  Since his intake is so precarious because of his episodic unresponsiveness and somnolence they should be a discussion to achieve a source of nourishment and hydration which is not affected by these episodes of change in mentation.  · Patient wants to take him home and wanted to deliver care at home is in the rehabilitation place with in-home physical therapy and other assistance to be arranged.  plan is to get case management consultation  · Parkinson's disease - neurology consultation  · Urinary retention - probably secondary to benign prostatic hyperplasia and effect of medication and immobility intermittent catheterization and if it is frequent consider Dial catheter and urology evaluation  · Dementia - neurology evaluation  · Progressive immobility - her allergy and physical therapy evaluation  · Mild anemia plan to watch  · See orders.    Iona Soriano MD   1/4/2018  9:59 AM  Much of this encounter note is an electronic transcription/translation of spoken language to printed text. The electronic translation of spoken language may permit  erroneous, or at times, nonsensical words or phrases to be inadvertently transcribed; Although I have reviewed the note for such errors, some may still exist

## 2018-01-04 NOTE — PLAN OF CARE
Problem: Patient Care Overview (Adult)  Goal: Plan of Care Review  Outcome: Ongoing (interventions implemented as appropriate)   01/04/18 0550   Coping/Psychosocial Response Interventions   Plan Of Care Reviewed With patient;spouse;daughter   Patient Care Overview   Progress improving   Outcome Evaluation   Outcome Summary/Follow up Plan Pt was really discharged to rehab after a bout of pneumonia and has since come in through ER for AMS. Pt  has gone from mostly somnolent to speaking more at times throughout the night; incoherent and illogical; unsure of baseline though normally disoriented to everything, but himself; d/t hx; pt was unable to participate in much of my assesment; was admitted to floor for AMS; i received orders for adiet, orthostatic vitals, and continuation of home meds. Called [facility] on call and payton instructed me to hold meds d/t AMS and a diet; started on NS at 75; ordered a head ct; negative; VSS; afebrile NS to sinus jessica ; family said he doesn't get up without assistance of 2 plus a walker (on a good day)     Goal: Adult Individualization and Mutuality  Outcome: Ongoing (interventions implemented as appropriate)    Goal: Discharge Needs Assessment  Outcome: Ongoing (interventions implemented as appropriate)      Problem: Confusion, Acute (Adult)  Goal: Identify Related Risk Factors and Signs and Symptoms  Outcome: Ongoing (interventions implemented as appropriate)    Goal: Cognitive/Functional Impairments Minimized  Outcome: Ongoing (interventions implemented as appropriate)    Goal: Safety  Outcome: Ongoing (interventions implemented as appropriate)      Problem: Skin Integrity Impairment, Risk/Actual (Adult)  Goal: Identify Related Risk Factors and Signs and Symptoms  Outcome: Ongoing (interventions implemented as appropriate)    Goal: Skin Integrity/Wound Healing  Outcome: Ongoing (interventions implemented as appropriate)      Problem: Fall Risk (Adult)  Goal: Identify Related Risk  Factors and Signs and Symptoms  Outcome: Ongoing (interventions implemented as appropriate)    Goal: Absence of Falls  Outcome: Ongoing (interventions implemented as appropriate)

## 2018-01-04 NOTE — SIGNIFICANT NOTE
01/04/18 1619   Rehab Treatment   Discipline physical therapist   Rehab Evaluation   Evaluation Not Performed patient unavailable for evaluation  (off floor to MRI, follow up tomorrow.)   Recommendation   PT - Next Appointment 01/05/18

## 2018-01-04 NOTE — PROGRESS NOTES
Discharge Planning Assessment  [facility]     Patient Name: Alexandro Hallman  MRN: [id number]  Today's Date: 1/4/2018    Admit Date: 1/3/2018          Discharge Needs Assessment       01/04/18 1037    Living Environment    Lives With spouse    Living Arrangements house   2 story    Home Accessibility bath not on first floor;stairs within home;stairs to enter home    Number of Stairs to Enter Home 3    Number of Stairs Within Home 12    Stair Railings at Home inside, present on right side;outside, present on right side    Type of Financial/Environmental Concern none    Transportation Available ambulance;car;family or friend will provide    Living Environment    Provides Primary Care For no one, unable/limited ability to care for self    Quality Of Family Relationships supportive    Able to Return to Prior Living Arrangements yes    Discharge Needs Assessment    Concerns To Be Addressed basic needs concerns    Readmission Within The Last 30 Days previous discharge plan unsuccessful    Outpatient/Agency/Support Group Needs homecare agency (specify level of care);skilled nursing facility (specify)    Anticipated Changes Related to Illness inability to care for self    Equipment Currently Used at Home walker, rolling;shower chair    Equipment Needed After Discharge other (see comments)   undetermined at this time    Discharge Facility/Level Of Care Needs home with home health;nursing facility, skilled    Discharge Disposition home healthcare service;skilled nursing facility            Discharge Plan       01/04/18 1038    Case Management/Social Work Plan    Plan Undetermined--   wife wants to take patient home with family assistance and home health    Patient/Family In Agreement With Plan yes   Mariam Hallman(wife) [phone]    Additional Comments Introduced self and explained role of CCP, IMM checked and facesheet verified with wife, Mariam at bedside due to patient disoriented.  Mariam states patient lives with  her in a 2 story house and the shower is on the 2nd floor and she has been assisting him with bathing, medications, meals, housekeeping and transportation prior to previous hospitalzation.  Mariam goss patient has a rolling walker and shower chair and is uncertain of DME needs at discharge.  Seven goss patient uses [facility] on [address] [place], [place] and denies any issues with affording or obtaining medications.  Seven goss patient was discharged on 1/2/18 to [facility] and she does not want him to return there at discharge and hopes to take him home with her and family assistance and home health and possible private pay personal care.  Wife was provided with home health provider list, In Home Personal Care provider list, Road to Recovery booklet and Today's Transitions booklet and CCP phone number/card and assured her that [facility] is here to assist her with patient's discharge needs and planning.......Shala Celaya RN,CCP         Discharge Placement     No information found                Demographic Summary       01/04/18 1032    Referral Information    Admission Type inpatient    Arrived From skilled nursing facility    Contact Information    Permission Granted to Share Information With family/designee   Mariam Hallman(wife) [phone]    Primary Care Physician Information    Name Buster Howe MD            Functional Status       01/04/18 1032    Functional Status Current    Ambulation 4-->completely dependent    Transferring 4-->completely dependent    Toileting 4-->completely dependent    Bathing 4-->completely dependent    Dressing 4-->completely dependent    Eating 4-->completely dependent    Communication 2-->difficulty understanding (not related to language barrier)    Change in Functional Status Since Onset of Current Illness/Injury yes    Functional Status Prior    Ambulation 3-->assistive equipment and person    Transferring 3-->assistive equipment and person    Toileting  3-->assistive equipment and person    Bathing 2-->assistive person    Dressing 2-->assistive person    Eating 2-->assistive person    IADL    Medications assistive person    Meal Preparation completely dependent    Housekeeping completely dependent    Laundry completely dependent    Shopping completely dependent    Oral Care assistive person    Activity Tolerance    Current Activity Limitations none    Usual Activity Tolerance fair    Current Activity Tolerance poor    Cognitive/Perceptual/Developmental    Current Mental Status/Cognitive Functioning not oriented to time;not oriented to place    Recent Changes in Mental Status/Cognitive Functioning memory (recent)    Developmental Stage (Eriksson's Stages of Development) Stage 8 (65 years-death/Late Adulthood) Integrity vs. Despair            Psychosocial     None            Abuse/Neglect     None            Legal       01/04/18 1033    Legal    Legal Comments Patient unable to communicate re: MPOA/LW.  Wife states she is his health care surrogate, Mariam Hallman([phone]......Shala Celaya RN,CCP            Substance Abuse     None            Patient Forms       01/04/18 1046    Patient Forms    Provider Choice List Delivered   Road to Recovery booklet, Today's Transitions booklet, Home Health provider list, In Home Personal Care list    Delivered to Support person   Mariam(wife)    Method of delivery In person          Shala Celaya RN

## 2018-01-04 NOTE — PLAN OF CARE
Problem: Patient Care Overview (Adult)  Goal: Plan of Care Review  Outcome: Ongoing (interventions implemented as appropriate)   01/04/18 0554 01/04/18 1636   Coping/Psychosocial Response Interventions   Plan Of Care Reviewed With --  spouse;family   Patient Care Overview   Progress improving --    Outcome Evaluation   Outcome Summary/Follow up Plan --  Pt more alert this afternoon. States first and last name. Dial catheter placed to bedside this afternoon for retention. Dr. Soriano spoke at length with wife about patient's status. Patient unable to participate in swallow study this AM, discussed Cortrak tube with wife. She states she is not ready to decide if she would like to have this placed. Will discuss with daughter. MRI/neuro consult done today. Will continue to monitor closely 1/4/17 @ 1640     Goal: Adult Individualization and Mutuality  Outcome: Ongoing (interventions implemented as appropriate)    Goal: Discharge Needs Assessment  Outcome: Ongoing (interventions implemented as appropriate)   01/04/18 1037 01/04/18 1636   Discharge Needs Assessment   Concerns To Be Addressed --  basic needs concerns;discharge planning concerns   Readmission Within The Last 30 Days previous discharge plan unsuccessful --    Equipment Needed After Discharge other (see comments)  (undetermined at this time) --    Discharge Facility/Level Of Care Needs home with home health;nursing facility, skilled --    Current Health   Outpatient/Agency/Support Group Needs homecare agency (specify level of care);skilled nursing facility (specify) --    Anticipated Changes Related to Illness inability to care for self --    Living Environment   Transportation Available ambulance;car;family or friend will provide --    Self-Care   Equipment Currently Used at Home walker, rolling;shower chair --        Problem: Confusion, Acute (Adult)  Goal: Identify Related Risk Factors and Signs and Symptoms  Outcome: Ongoing (interventions implemented as  appropriate)   01/04/18 1636   Confusion, Acute   Related Risk Factors (Acute Confusion) dehydration;malnutrition     Goal: Cognitive/Functional Impairments Minimized  Outcome: Ongoing (interventions implemented as appropriate)   01/04/18 1636   Confusion, Acute (Adult)   Cognitive/Functional Impairments Minimized making progress toward outcome     Goal: Safety  Outcome: Ongoing (interventions implemented as appropriate)   01/04/18 1636   Confusion, Acute (Adult)   Safety making progress toward outcome       Problem: Skin Integrity Impairment, Risk/Actual (Adult)  Goal: Identify Related Risk Factors and Signs and Symptoms  Outcome: Ongoing (interventions implemented as appropriate)   01/04/18 1636   Skin Integrity Impairment, Risk/Actual   Skin Integrity Impairment, Risk/Actual: Related Risk Factors age extremes;cognitive impairment;fluid/nutrition status     Goal: Skin Integrity/Wound Healing  Outcome: Ongoing (interventions implemented as appropriate)   01/04/18 1636   Skin Integrity Impairment, Risk/Actual (Adult)   Skin Integrity/Wound Healing making progress toward outcome       Problem: Fall Risk (Adult)  Goal: Identify Related Risk Factors and Signs and Symptoms  Outcome: Ongoing (interventions implemented as appropriate)   01/04/18 1636   Fall Risk   Fall Risk: Related Risk Factors age-related changes;confusion/agitation;gait/mobility problems     Goal: Absence of Falls  Outcome: Ongoing (interventions implemented as appropriate)   01/04/18 1636   Fall Risk (Adult)   Absence of Falls making progress toward outcome       Problem: Pressure Ulcer Risk (Jesse Scale) (Adult,Obstetrics,Pediatric)  Goal: Identify Related Risk Factors and Signs and Symptoms  Outcome: Ongoing (interventions implemented as appropriate)    Goal: Skin Integrity  Outcome: Ongoing (interventions implemented as appropriate)   01/04/18 1636   Pressure Ulcer Risk (Jesse Scale) (Adult,Obstetrics,Pediatric)   Skin Integrity making progress  toward outcome

## 2018-01-04 NOTE — CONSULTS
Patient Identification:  NAME:  Alexandro Hallman  Age:  77 y.o.   Sex:  male   :  1940   MRN:  [id number]       Chief complaint: He doesn't have one reason for consult lethargy confusion change in mental status    History of present illness:  This gentleman is a 77-year-old right-handed white male with history of Parkinson's disease communicating hydrocephalus arthritis anorexia Alzheimer's dementia who is been having a decreasing ambulation context patient is had pneumonia diagnosed recently quality is decreased ambulation difficulty supporting himself and getting his legs to work context patient has Parkinson's disease associated symptoms hallucinations modifying factors the Risperdal seems to help the hallucinations according to the wife location his legs are weak but he is basically weak all over CT of the head shows chronic changes only      Past medical history:  Past Medical History:   Diagnosis Date   • Abnormal TSH 2008   • Alzheimer's dementia    • Anorexia 2008   • Arthritis    • Arthritis of shoulder 2014   • Arthropathy of pelvic region and thigh    • BPH (benign prostatic hyperplasia)    • Bursitis    • Cholelithiasis 2008   • Chronic constipation    • Colonic polyp    • Communicating hydrocephalus    • Constipation    • Contusion of thigh, left 2014   • COPD (chronic obstructive pulmonary disease)    • DDD (degenerative disc disease), cervical    • Dementia     2012   • Depression 2012   • Diverticulosis    • Eczema    • Glossitis 2009   • Hypertension 2007   • Hypothyroidism 2011   • Ingrown nail 2007   • Lumbar stenosis    • Lumbar stenosis    • Pneumonia    • Right shoulder tendonitis    • Shortness of breath        Past surgical history:  Past Surgical History:   Procedure Laterality Date   • BACK SURGERY     • CATARACT EXTRACTION, BILATERAL     • COLONOSCOPY  2011   • FOOT SURGERY Left 1970    spur repair   •  FOOT SURGERY Right 1993    spur   • HERNIA REPAIR  1969    double hernia repair   • KNEE SURGERY  1967    Left Knee   • SPINE SURGERY  1997    disc repair       Allergies:  Factive [gemifloxacin]; Fluticasone-salmeterol; and Cefdinir    Home medications:  Prescriptions Prior to Admission   Medication Sig Dispense Refill Last Dose   • amLODIPine (NORVASC) 2.5 MG tablet Take 1 tablet by mouth Daily. 30 tablet 3    • amoxicillin-clavulanate (AUGMENTIN) 875-125 MG per tablet Take 1 tablet by mouth Every 12 (Twelve) Hours for 12 doses. Indications: Pneumonia 12 tablet 0    • aspirin 81 MG chewable tablet Chew 81 mg daily.   Taking   • cholecalciferol (VITAMIN D3) 1000 UNITS tablet Take 2,000 Units by mouth 2 (Two) Times a Day.   Taking   • donepezil (ARICEPT) 23 MG tablet Take 23 mg by mouth Every Night.   Taking   • isosorbide mononitrate (IMDUR) 30 MG 24 hr tablet Take 1 tablet by mouth Daily. 30 tablet 3    • levothyroxine (SYNTHROID, LEVOTHROID) 50 MCG tablet Take 1 tablet by mouth Daily for 180 days. 90 tablet 1 Taking   • memantine (NAMENDA XR) 28 MG capsule sustained-release 24 hr extended release capsule Take 28 mg by mouth Daily.      • mirtazapine (REMERON) 30 MG tablet Take 30 mg by mouth Daily.   Taking   • polyethylene glycol (GLYCOLAX) powder Take 17 g by mouth As Needed.   Taking   • pramipexole (MIRAPEX) 0.125 MG tablet Take 0.125 mg by mouth 3 (Three) Times a Day.      • risperiDONE (risperDAL) 1 MG tablet Take 1 mg by mouth Every Night.   Taking   • tamsulosin (FLOMAX) 0.4 MG capsule 24 hr capsule Take 1 capsule by mouth Every Night for 180 days. 90 capsule 1 Taking   • Umeclidinium-Vilanterol (ANORO ELLIPTA) 62.5-25 MCG/INH aerosol powder  Inhale 1 puff Daily.   Taking        Hospital medications:    amLODIPine 2.5 mg Oral Daily   aspirin 81 mg Oral Daily   cholecalciferol 2,000 Units Oral Daily   donepezil 20 mg Oral Nightly   enoxaparin 40 mg Subcutaneous Nightly   isosorbide mononitrate 30 mg Oral  Daily   levothyroxine 50 mcg Oral Q AM   memantine 10 mg Oral Q12H   mirtazapine 30 mg Oral Nightly   piperacillin-tazobactam 3.375 g Intravenous Q8H   risperiDONE 1 mg Oral Nightly   tamsulosin 0.4 mg Oral Daily       dextrose 5 % and sodium chloride 0.9 % 125 mL/hr Last Rate: Stopped (01/04/18 1426)   Pharmacy to Dose Zosyn     sodium chloride 0.9 % with KCl 20 mEq 100 mL/hr Last Rate: 100 mL/hr (01/04/18 1424)     •  acetaminophen  •  nitroglycerin  •  ondansetron  •  Pharmacy to Dose Zosyn  •  polyethylene glycol  •  sodium chloride  •  sodium chloride  •  Insert peripheral IV **AND** sodium chloride  •  sodium chloride    Family history:  Family History   Problem Relation Age of Onset   • Heart disease Mother    • Liver cancer Mother    • Hypertension Mother    • Cancer Mother    • Coronary artery disease Father    • Dementia Father    • Heart disease Father    • Stroke Maternal Grandfather    • Alzheimer's disease Other        Social history:  Social History   Substance Use Topics   • Smoking status: Former Smoker     Types: Cigarettes     Quit date: 4/20/2017   • Smokeless tobacco: Never Used   • Alcohol use No       Review of systems:    He is had some hallucinations but the medicine Risperdal seems to help that nicely according to the wife she denies him having headaches use is been lethargic and harder to get out of it or move around no other review of systems is possible I reviewed the chart the patient cannot give any review of systems    Objective:  Vitals Ranges:   Temp:  [97.7 °F (36.5 °C)-99.9 °F (37.7 °C)] 98.1 °F (36.7 °C)  Heart Rate:  [48-71] 60  Resp:  [18-22] 18  BP: (112-157)/() 157/100      Physical Exam:  Awake but lethargic he is not oriented ×3 but to person only fund of knowledge poor attention span and concentration poor recent remote memory poor language dysarthria without a aphasia elderly appearing in no distress visual acuity he was able to count fingers at 3 feet there's no  clubbing cyanosis or edema he is having some difficulty following commands pupils to constricting to one half eyes are conjugate face is symmetrical tongue is midline he wouldn't follow any other cranial nerve testing motor rigidity bradykinesia in all 4 extremities overall strength 4 out of 5 reflexes trace throughout symmetrical toes downgoing bilaterally sensation he couldn't follow any of this coordination he could not follow station and gait was impossible as he was a dead lift heart regular without murmur neck is rigid but appropriately rigid for the rest of his rigidity throughout visual acuity normal at 3 feet no clubbing cyanosis or edema    Results review:   I reviewed the patient's new clinical results.    Data review:  Lab Results (last 24 hours)     Procedure Component Value Units Date/Time    CBC & Differential [[id number]] Collected:  01/03/18 1932    Specimen:  Blood Updated:  01/03/18 1951    Narrative:       The following orders were created for panel order CBC & Differential.  Procedure                               Abnormality         Status                     ---------                               -----------         ------                     CBC Auto Differential[[id number]]        Abnormal            Final result                 Please view results for these tests on the individual orders.    CBC Auto Differential [[id number]]  (Abnormal) Collected:  01/03/18 1932    Specimen:  Blood Updated:  01/03/18 1951     WBC 11.62 (H) 10*3/mm3      RBC 4.19 (L) 10*6/mm3      Hemoglobin 11.8 (L) g/dL      Hematocrit 36.4 (L) %      MCV 86.9 fL      MCH 28.2 pg      MCHC 32.4 (L) g/dL      RDW 14.5 %      RDW-SD 45.1 fl      MPV 9.3 fL      Platelets 308 10*3/mm3      Neutrophil % 68.8 %      Lymphocyte % 19.4 (L) %      Monocyte % 7.5 %      Eosinophil % 3.5 %      Basophil % 0.5 %      Immature Grans % 0.3 %      Neutrophils, Absolute 7.99 10*3/mm3      Lymphocytes, Absolute 2.25 10*3/mm3       Monocytes, Absolute 0.87 10*3/mm3      Eosinophils, Absolute 0.41 10*3/mm3      Basophils, Absolute 0.06 10*3/mm3      Immature Grans, Absolute 0.04 (H) 10*3/mm3     Protime-INR [[id number]]  (Abnormal) Collected:  01/03/18 1932    Specimen:  Blood Updated:  01/03/18 2000     Protime 15.0 (H) Seconds      INR 1.22 (H)    Urinalysis With / Culture If Indicated - Urine, Catheter [[id number]]  (Normal) Collected:  01/03/18 1934    Specimen:  Urine from Urine, Catheter Updated:  01/03/18 2001     Color, UA Yellow     Appearance, UA Clear     pH, UA 7.5     Specific Gravity, UA 1.015     Glucose, UA Negative     Ketones, UA Negative     Bilirubin, UA Negative     Blood, UA Negative     Protein, UA Negative     Leuk Esterase, UA Negative     Nitrite, UA Negative     Urobilinogen, UA 1.0 E.U./dL    Narrative:       Urine microscopic not indicated.    Influenza Antigen, Rapid - Swab, Nasopharynx [[id number]]  (Normal) Collected:  01/03/18 1933    Specimen:  Swab from Nasopharynx Updated:  01/03/18 2002     Influenza A Ag, EIA Negative     Influenza B Ag, EIA Negative    Lactic Acid, Plasma [[id number]]  (Normal) Collected:  01/03/18 1932    Specimen:  Blood Updated:  01/03/18 2006     Lactate 0.7 mmol/L     Magnesium [[id number]]  (Normal) Collected:  01/03/18 1932    Specimen:  Blood Updated:  01/03/18 2015     Magnesium 2.1 mg/dL     Comprehensive Metabolic Panel [[id number]]  (Abnormal) Collected:  01/03/18 1932    Specimen:  Blood Updated:  01/03/18 2015     Glucose 72 mg/dL      BUN 19 mg/dL      Creatinine 1.09 mg/dL      Sodium 138 mmol/L      Potassium 3.9 mmol/L      Chloride 101 mmol/L      CO2 25.2 mmol/L      Calcium 8.7 mg/dL      Total Protein 6.1 g/dL      Albumin 3.00 (L) g/dL      ALT (SGPT) 12 U/L      AST (SGOT) 15 U/L      Alkaline Phosphatase 116 U/L      Total Bilirubin 0.4 mg/dL      eGFR Non African Amer 65 mL/min/1.73      Globulin 3.1 gm/dL      A/G Ratio 1.0 g/dL      BUN/Creatinine Ratio 17.4     Anion  "Gap 11.8 mmol/L     Narrative:       The MDRD GFR formula is only valid for adults with stable renal function between ages 18 and 70.    Procalcitonin [[id number]]  (Abnormal) Collected:  01/03/18 1932    Specimen:  Blood Updated:  01/03/18 2022     Procalcitonin 0.05 (L) ng/mL     Narrative:       As a Marker for Sepsis (Non-Neonates):   1. <0.5 ng/mL represents a low risk of severe sepsis and/or septic shock.  1. >2 ng/mL represents a high risk of severe sepsis and/or septic shock.    As a Marker for Lower Respiratory Tract Infections that require antibiotic therapy:  PCT on Admission     Antibiotic Therapy             6-12 Hrs later  > 0.5                Strongly Recommended            >0.25 - <0.5         Recommended  0.1 - 0.25           Discouraged                   Remeasure/reassess PCT  <0.1                 Strongly Discouraged          Remeasure/reassess PCT      As 28 day mortality risk marker: \"Change in Procalcitonin Result\" (> 80 % or <=80 %) if Day 0 (or Day 1) and Day 4 values are available. Refer to [email or web address]/   Change in PCT <=80 %   A decrease of PCT levels below or equal to 80 % defines a positive change in PCT test result representing a higher risk for 28-day all-cause mortality of patients diagnosed with severe sepsis or septic shock.  Change in PCT > 80 %   A decrease of PCT levels of more than 80 % defines a negative change in PCT result representing a lower risk for 28-day all-cause mortality of patients diagnosed with severe sepsis or septic shock.                CBC & Differential [[id number]] Collected:  01/04/18 0553    Specimen:  Blood Updated:  01/04/18 0639    Narrative:       The following orders were created for panel order CBC & Differential.  Procedure                               Abnormality         Status                     ---------                               -----------         ------                     CBC Auto Differential[[id number]]        " Abnormal            Final result                 Please view results for these tests on the individual orders.    CBC Auto Differential [[id number]]  (Abnormal) Collected:  01/04/18 0553    Specimen:  Blood Updated:  01/04/18 0639     WBC 12.05 (H) 10*3/mm3      RBC 4.38 (L) 10*6/mm3      Hemoglobin 12.1 (L) g/dL      Hematocrit 38.2 (L) %      MCV 87.2 fL      MCH 27.6 pg      MCHC 31.7 (L) g/dL      RDW 14.0 %      RDW-SD 44.5 fl      MPV 9.5 fL      Platelets 274 10*3/mm3      Neutrophil % 74.5 %      Lymphocyte % 15.2 (L) %      Monocyte % 7.5 %      Eosinophil % 2.4 %      Basophil % 0.2 %      Immature Grans % 0.2 %      Neutrophils, Absolute 8.98 (H) 10*3/mm3      Lymphocytes, Absolute 1.83 10*3/mm3      Monocytes, Absolute 0.90 10*3/mm3      Eosinophils, Absolute 0.29 10*3/mm3      Basophils, Absolute 0.03 10*3/mm3      Immature Grans, Absolute 0.02 10*3/mm3     Comprehensive Metabolic Panel [[id number]]  (Abnormal) Collected:  01/04/18 0553    Specimen:  Blood Updated:  01/04/18 0659     Glucose 66 mg/dL      BUN 22 mg/dL      Creatinine 1.03 mg/dL      Sodium 135 (L) mmol/L      Potassium 3.8 mmol/L      Chloride 99 mmol/L      CO2 21.6 (L) mmol/L      Calcium 8.4 (L) mg/dL      Total Protein 6.1 g/dL      Albumin 3.10 (L) g/dL      ALT (SGPT) 10 U/L      AST (SGOT) 15 U/L      Alkaline Phosphatase 112 U/L      Total Bilirubin 0.6 mg/dL      eGFR Non African Amer 70 mL/min/1.73      Globulin 3.0 gm/dL      A/G Ratio 1.0 g/dL      BUN/Creatinine Ratio 21.4     Anion Gap 14.4 mmol/L     Narrative:       The MDRD GFR formula is only valid for adults with stable renal function between ages 18 and 70.    POC Glucose Once [[id number]]  (Normal) Collected:  01/04/18 1059    Specimen:  Blood Updated:  01/04/18 1112     Glucose 81 mg/dL     Narrative:       Meter: [id number] : [id number] Tsering Jackson RN           Imaging:  Imaging Results (last 24 hours)     Procedure Component Value Units Date/Time    XR Chest 1  View [[id number]] Collected:  01/03/18 1953     Updated:  01/03/18 1957    Narrative:       XR CHEST 1 VW-     HISTORY: Male who is 77 years-old,  altered mental status     TECHNIQUE: Frontal view of the chest     COMPARISON: 12/31/2017     FINDINGS: Heart size is borderline. Aorta is tortuous. Persistent  infiltrate /effusion at the left base. No pneumothorax. No acute osseous  process.       Impression:       No significant change, continued follow-up suggested.     This report was finalized on 1/3/2018 7:54 PM by Dr. Vicente Salcido MD.       CT Head Without Contrast [[id number]] Collected:  01/04/18 0223     Updated:  01/04/18 0229    Narrative:       CRANIAL CT SCAN WITHOUT CONTRAST     CLINICAL HISTORY: AMS; R41.82-Altered mental status, unspecified;  G93.41-Metabolic encephalopathy; R91.8-Other nonspecific abnormal  finding of lung field     COMPARISON: 04/18/2017.     TECHNIQUE: Radiation dose reduction techniques were utilized, including  automated exposure control and exposure modulation based on body size.  Multiple axial images of the head were obtained without contrast.      FINDINGS:  There are no abnormal areas of increased density or mass  effect. There is diffuse atrophy. There are extensive scattered areas of  decreased density diffusely in the white matter likely related to  advanced chronic ischemic gliotic changes.   Ventricles, sulci, and  cisterns appear normal. Bone window images are unremarkable.                Impression:       1. No acute intracranial abnormality.                     This study was performed with techniques to keep radiation doses as low  as reasonably achievable (ALARA). Individualized dose reduction  techniques using automated exposure control or adjustment of mA and/or  kV according to the patient size were employed.      This report was finalized on 1/4/2018 2:26 AM by Jarvis Reagan MD.       MRI Brain With & Without Contrast [[id number]] Updated:  01/04/18 1627              Assessment and Plan:     Principal Problem:    Altered mental status  Active Problems:    Alzheimer's type dementia    Central spinal stenosis    Essential hypertension    Hypothyroidism    Communicating hydrocephalus    Parkinson's disease    Generalized weakness    Pneumonia    Acute metabolic encephalopathy    This to him and has metabolic encephalopathy on top of Parkinson's disease and vascular dementia.  He has had an MRI scan but the results are still pending at this time he does not look like an acute stroke syndrome based upon his presentation and exam.  He is on Risperdal 1 mg each night and I would like to temporarily hold the Remeron to see if he could awaken a bit more Remeron is known to be quite sedating   01/04/18  4:40 PM

## 2018-01-04 NOTE — SIGNIFICANT NOTE
01/04/18 1108   Rehab Treatment   Discipline speech language pathologist   Rehab Evaluation   Evaluation Not Performed other (see comments)  (Unable to wake patient up for eval.)   Recommendations   SLP - Next Appointment 01/05/18  (Will attempt to complete swallow eval tomorrow.)

## 2018-01-04 NOTE — ED PROVIDER NOTES
EMERGENCY DEPARTMENT ENCOUNTER    CHIEF COMPLAINT  Chief Complaint: AMS  History given by: pt/ family present at bedside   History limited by: AMS  Room Number: 41/41  PMD: Buster Howe MD      HPI:  Pt is a 77 y.o. male who presents via EMS complaining of chronic AMS with worsening symptoms that began earlier today. Family states that pt was last known normal at approximately 2000 last night. Family states that pt does have normal speech difficulty, but there was worsening today. Family also c/o increased confusion and decreased levels of consciousness. Pt was staying at a skilled nursing facility due to generalized weakness and ambulation difficulty [cannot ambulate without assistance]. Family states that pt does not feed himself and they have to help him intermittently. Pt has a hx of dementia. Family states that pt was unable to take his normal medications today due to AMS.     Duration:  Chronic with worsening beginning today   Onset: gradual   Timing: constant   Location: N/A  Radiation: N/A  Quality: AMS   Intensity/Severity: moderate   Progression: worsening   Associated Symptoms: confusion, increased speech difficulty, decreased level of consciousness   Aggravating Factors: None reported   Alleviating Factors: None reported   Previous Episodes: Pt has a hx of dementia.   Treatment before arrival: Family states that pt was unable to take his normal medications today due to AMS.       PAST MEDICAL HISTORY  Active Ambulatory Problems     Diagnosis Date Noted   • Alzheimer's type dementia 11/18/2015   • Central spinal stenosis 12/10/2015   • Essential hypertension 01/11/2016   • Hypothyroidism 01/11/2016   • Benign prostatic hypertrophy (BPH) with incomplete bladder emptying 01/11/2016   • Communicating hydrocephalus 07/12/2016   • Parkinson's disease 09/13/2016   • Gait instability 09/13/2016   • Weakness 04/18/2017   • Hallucinations, unspecified 07/12/2017   • Generalized weakness 12/30/2017   • Pneumonia  12/31/2017   • Acute metabolic encephalopathy 01/01/2018     Resolved Ambulatory Problems     Diagnosis Date Noted   • Viral syndrome 11/18/2015   • Abnormal thyroid blood test 12/10/2015   • Elevated brain natriuretic peptide (BNP) level 12/10/2015   • Leg weakness, bilateral 12/10/2015   • Localized edema 12/10/2015   • Hospital discharge follow-up 12/10/2015   • Elevated troponin 04/18/2017     Past Medical History:   Diagnosis Date   • Abnormal TSH 12/2008   • Alzheimer's dementia    • Anorexia 02/28/2008   • Arthritis    • Arthritis of shoulder 04/01/2014   • Arthropathy of pelvic region and thigh    • BPH (benign prostatic hyperplasia)    • Bursitis    • Cholelithiasis 03/27/2008   • Chronic constipation    • Colonic polyp    • Communicating hydrocephalus    • Constipation    • Contusion of thigh, left 02/19/2014   • COPD (chronic obstructive pulmonary disease)    • DDD (degenerative disc disease), cervical    • Dementia    • Depression 05/02/2012   • Diverticulosis    • Eczema    • Glossitis 09/17/2009   • Hypertension 12/03/2007   • Hypothyroidism 03/14/2011   • Ingrown nail 12/2007   • Lumbar stenosis    • Lumbar stenosis    • Pneumonia    • Right shoulder tendonitis 02/29/2014   • Shortness of breath        PAST SURGICAL HISTORY  Past Surgical History:   Procedure Laterality Date   • BACK SURGERY     • CATARACT EXTRACTION, BILATERAL     • COLONOSCOPY  11/04/2011   • FOOT SURGERY Left 1970    spur repair   • FOOT SURGERY Right 1993    spur   • HERNIA REPAIR  1969    double hernia repair   • KNEE SURGERY  1967    Left Knee   • SPINE SURGERY  1997    disc repair       FAMILY HISTORY  Family History   Problem Relation Age of Onset   • Heart disease Mother    • Liver cancer Mother    • Hypertension Mother    • Cancer Mother    • Coronary artery disease Father    • Dementia Father    • Heart disease Father    • Stroke Maternal Grandfather    • Alzheimer's disease Other        SOCIAL HISTORY  Social History      Social History   • Marital status:      Spouse name: N/A   • Number of children: N/A   • Years of education: N/A     Occupational History   • Not on file.     Social History Main Topics   • Smoking status: Former Smoker     Types: Cigarettes     Quit date: 4/20/2017   • Smokeless tobacco: Never Used   • Alcohol use No   • Drug use: No   • Sexual activity: No     Other Topics Concern   • Not on file     Social History Narrative       ALLERGIES  Factive [gemifloxacin]; Fluticasone-salmeterol; and Cefdinir    REVIEW OF SYSTEMS  Review of Systems   Constitutional: Negative.  Negative for chills and fever.   HENT: Negative.  Negative for sore throat.    Eyes: Negative.    Respiratory: Negative.  Negative for cough.    Cardiovascular: Negative.  Negative for chest pain.   Gastrointestinal: Negative.    Genitourinary: Negative.  Negative for dysuria.   Musculoskeletal: Negative.  Negative for back pain.   Skin: Negative.  Negative for rash.   Neurological: Positive for syncope (decreased levels of consciousness ) and speech difficulty (increased ). Negative for headaches.   Psychiatric/Behavioral: Positive for confusion.       PHYSICAL EXAM  ED Triage Vitals   Temp Heart Rate Resp BP SpO2   01/03/18 1739 01/03/18 1739 01/03/18 1744 01/03/18 1739 01/03/18 1739   99.9 °F (37.7 °C) 52 22 137/88 98 %      Temp src Heart Rate Source Patient Position BP Location FiO2 (%)   01/03/18 1739 -- -- -- --   Tympanic           Physical Exam   Constitutional: He is well-developed, well-nourished, and in no distress.   Elderly, lethargic   Hx is limited due to pt's current condition    HENT:   Head: Normocephalic and atraumatic.   Eyes: EOM are normal. Pupils are equal, round, and reactive to light.   Neck: Normal range of motion. Neck supple.   Cardiovascular: Normal rate, regular rhythm, normal heart sounds and intact distal pulses.    Pulmonary/Chest: Effort normal and breath sounds normal. No respiratory distress.    Abdominal: Soft. There is no tenderness. There is no rebound and no guarding.   Musculoskeletal: Normal range of motion. He exhibits no edema.   Moves all extremities well    Neurological: He is alert. He has normal sensation and normal strength.   Skin: Skin is warm and dry.   Psychiatric: Mood and affect normal.   Nursing note and vitals reviewed.      LAB RESULTS  Lab Results (last 24 hours)     Procedure Component Value Units Date/Time    CBC & Differential [[id number]] Collected:  01/03/18 1932    Specimen:  Blood Updated:  01/03/18 1951    Narrative:       The following orders were created for panel order CBC & Differential.  Procedure                               Abnormality         Status                     ---------                               -----------         ------                     CBC Auto Differential[[id number]]        Abnormal            Final result                 Please view results for these tests on the individual orders.    Comprehensive Metabolic Panel [[id number]]  (Abnormal) Collected:  01/03/18 1932    Specimen:  Blood Updated:  01/03/18 2015     Glucose 72 mg/dL      BUN 19 mg/dL      Creatinine 1.09 mg/dL      Sodium 138 mmol/L      Potassium 3.9 mmol/L      Chloride 101 mmol/L      CO2 25.2 mmol/L      Calcium 8.7 mg/dL      Total Protein 6.1 g/dL      Albumin 3.00 (L) g/dL      ALT (SGPT) 12 U/L      AST (SGOT) 15 U/L      Alkaline Phosphatase 116 U/L      Total Bilirubin 0.4 mg/dL      eGFR Non African Amer 65 mL/min/1.73      Globulin 3.1 gm/dL      A/G Ratio 1.0 g/dL      BUN/Creatinine Ratio 17.4     Anion Gap 11.8 mmol/L     Narrative:       The MDRD GFR formula is only valid for adults with stable renal function between ages 18 and 70.    Protime-INR [[id number]]  (Abnormal) Collected:  01/03/18 1932    Specimen:  Blood Updated:  01/03/18 2000     Protime 15.0 (H) Seconds      INR 1.22 (H)    Lactic Acid, Plasma [[id number]]  (Normal) Collected:  01/03/18 1932    Specimen:   "Blood Updated:  01/03/18 2006     Lactate 0.7 mmol/L     Procalcitonin [[id number]]  (Abnormal) Collected:  01/03/18 1932    Specimen:  Blood Updated:  01/03/18 2022     Procalcitonin 0.05 (L) ng/mL     Narrative:       As a Marker for Sepsis (Non-Neonates):   1. <0.5 ng/mL represents a low risk of severe sepsis and/or septic shock.  1. >2 ng/mL represents a high risk of severe sepsis and/or septic shock.    As a Marker for Lower Respiratory Tract Infections that require antibiotic therapy:  PCT on Admission     Antibiotic Therapy             6-12 Hrs later  > 0.5                Strongly Recommended            >0.25 - <0.5         Recommended  0.1 - 0.25           Discouraged                   Remeasure/reassess PCT  <0.1                 Strongly Discouraged          Remeasure/reassess PCT      As 28 day mortality risk marker: \"Change in Procalcitonin Result\" (> 80 % or <=80 %) if Day 0 (or Day 1) and Day 4 values are available. Refer to http://www.[employer]-pct-calculator.com/   Change in PCT <=80 %   A decrease of PCT levels below or equal to 80 % defines a positive change in PCT test result representing a higher risk for 28-day all-cause mortality of patients diagnosed with severe sepsis or septic shock.  Change in PCT > 80 %   A decrease of PCT levels of more than 80 % defines a negative change in PCT result representing a lower risk for 28-day all-cause mortality of patients diagnosed with severe sepsis or septic shock.                Magnesium [[id number]]  (Normal) Collected:  01/03/18 1932    Specimen:  Blood Updated:  01/03/18 2015     Magnesium 2.1 mg/dL     CBC Auto Differential [[id number]]  (Abnormal) Collected:  01/03/18 1932    Specimen:  Blood Updated:  01/03/18 1951     WBC 11.62 (H) 10*3/mm3      RBC 4.19 (L) 10*6/mm3      Hemoglobin 11.8 (L) g/dL      Hematocrit 36.4 (L) %      MCV 86.9 fL      MCH 28.2 pg      MCHC 32.4 (L) g/dL      RDW 14.5 %      RDW-SD 45.1 fl      MPV 9.3 fL      Platelets 308 " 10*3/mm3      Neutrophil % 68.8 %      Lymphocyte % 19.4 (L) %      Monocyte % 7.5 %      Eosinophil % 3.5 %      Basophil % 0.5 %      Immature Grans % 0.3 %      Neutrophils, Absolute 7.99 10*3/mm3      Lymphocytes, Absolute 2.25 10*3/mm3      Monocytes, Absolute 0.87 10*3/mm3      Eosinophils, Absolute 0.41 10*3/mm3      Basophils, Absolute 0.06 10*3/mm3      Immature Grans, Absolute 0.04 (H) 10*3/mm3     Influenza Antigen, Rapid - Swab, Nasopharynx [[id number]]  (Normal) Collected:  01/03/18 1933    Specimen:  Swab from Nasopharynx Updated:  01/03/18 2002     Influenza A Ag, EIA Negative     Influenza B Ag, EIA Negative    Urinalysis With / Culture If Indicated - Urine, Catheter [[id number]]  (Normal) Collected:  01/03/18 1934    Specimen:  Urine from Urine, Catheter Updated:  01/03/18 2001     Color, UA Yellow     Appearance, UA Clear     pH, UA 7.5     Specific Gravity, UA 1.015     Glucose, UA Negative     Ketones, UA Negative     Bilirubin, UA Negative     Blood, UA Negative     Protein, UA Negative     Leuk Esterase, UA Negative     Nitrite, UA Negative     Urobilinogen, UA 1.0 E.U./dL    Narrative:       Urine microscopic not indicated.          I ordered the above labs and reviewed the results    RADIOLOGY  XR Chest 1 View   Final Result   No significant change, continued follow-up suggested.       This report was finalized on 1/3/2018 7:54 PM by Dr. Vicente Salcido MD.               I ordered the above noted radiological studies. Interpreted by radiologist. Reviewed by me in PACS.       PROCEDURES  Procedures      PROGRESS AND CONSULTS  ED Course     1921  Ordered IVF, labs, and Chest XR for further evaluation.     2137  Placed consult to [facility].     2208  Discussed pt's case with Dr. Henao [[facility]] who agrees to admit.     MEDICAL DECISION MAKING  Results were reviewed/discussed with the patient and they were also made aware of online access. Pt also made aware that some labs, such as cultures, will not  be resulted during ER visit and follow up with PMD is necessary.     MDM  Number of Diagnoses or Management Options     Amount and/or Complexity of Data Reviewed  Clinical lab tests: ordered and reviewed (WBC = 11.62)  Tests in the radiology section of CPT®: ordered and reviewed (Chest XR  FINDINGS: Heart size is borderline. Aorta is tortuous. Persistent infiltrate /effusion at the left base. No pneumothorax. No acute osseous process.)  Discussion of test results with the performing providers: yes (Dr. Henao ([facility]) )  Independent visualization of images, tracings, or specimens: yes           DIAGNOSIS  Final diagnoses:   None       DISPOSITION  ADMISSION    Discussed treatment plan and reason for admission with pt/family and admitting physician.  Pt/family voiced understanding of the plan for admission for further testing/treatment as needed.         Latest Documented Vital Signs:  As of 10:09 PM  BP- 112/73 HR- 54 Temp- 99.9 °F (37.7 °C) (Tympanic) O2 sat- 95%    --  Documentation assistance provided by minda Huerta for Dr. Hill.  Information recorded by the scrfaviane was done at my direction and has been verified and validated by me.          Anthony Huerta  01/03/18 9462       Geoff Hill MD  01/04/18 5456

## 2018-01-04 NOTE — DISCHARGE PLACEMENT REQUEST
"Alexandro Hallman (77 y.o. Male)     Date of Birth Social Security Number Address Home Phone MRN    1940  [address] [address] [phone] [id number]    [employer] Marital Status          [employer]        Admission Date Admission Type Admitting Provider Attending Provider Department, Room/Bed    1/3/18 Emergency Angelic Croft MD Hayden, Juliana, MD [address], 410/1    Discharge Date Discharge Disposition Discharge Destination                      Attending Provider: Angelic Croft MD     Allergies:  Factive [Gemifloxacin], Fluticasone-salmeterol, Cefdinir    Isolation:  None   Infection:  None   Code Status:  FULL    Ht:  190.5 cm (75\")   Wt:  72.8 kg (160 lb 6.4 oz)    Admission Cmt:  None   Principal Problem:  Altered mental status [R41.82]                 Active Insurance as of 1/3/2018     Primary Coverage     Payor Plan Insurance Group Employer/Plan Group    [facility] MEDICARE REPLACEMENT [facility] [id number]     Payor Plan Address Payor Plan Phone Number Effective From Effective To    [address] [address]  1/1/2015     [place] [address]       Subscriber Name Subscriber Birth Date Member ID       ALEXANDRO HALLMAN 1940 [id number]                 Emergency Contacts      (Rel.) Home Phone Work Phone Mobile Phone    Mariam Hallman (Spouse) [phone] -- --              "

## 2018-01-04 NOTE — ED NOTES
"This nurse entered room and family stated they think the patient is in pain. Patient in bed talking with incomprehensible speech and appears restless in bed. This RN asked family to step out of room so patient can be cathed, patient asked if he was in pain patient said \"no\" but continued to moan and talk but this RN is unsure of what patient is saying. Speech is not slurred just incomprehensible. Patient cathed and 400 cc yellow urine obtained, patient appears more calm after being straight cathed. Family back in room and agrees patient appears more calm post cath and wife reports his brief was not wet at the facility, brief was also dry upon this nurse inspecting brief. Wife and family are unsure the last time patient voided and they state he usually take medications to help with that but he did not take any meds today due to his altered mental status. Patient was recently discharged for pneumonia and was at the facility for rehab before going back home. Patient does have a history of alzheimer's with dementia and parkinson's.      Alessandra Reyes RN  01/03/18 1902       Alessandra Reyes RN  01/03/18 1903    "

## 2018-01-04 NOTE — ED NOTES
This RN in to place ledezma to BSD per Dr. Hill orders but patient has voided large amount of urine, Dr. Hill no longer wants ledezma placed.      Alessandra Reyes RN  01/03/18 [id number]

## 2018-01-04 NOTE — CONSULTS
Adult Nutrition  Assessment/PES    Patient Name:  Alexandro Hallman  YOB: 1940  MRN: [id number]  Admit Date:  1/3/2018    Assessment Date:  1/4/2018    Comments:  Malnutrition Severity Assessment consult received.  Awaiting swallow eval. Family reports pt had a good appetite prior to going to rehab.  No significant weight loss noted. Currently, does not meet criteria for malnutrition.  Will follow for po if able to wake up enough for eval.          Reason for Assessment       01/04/18 1359    Reason for Assessment    Reason For Assessment/Visit physician consult    Diagnosis Diagnosis    Neurological AMS;Dementia;Parkinson's;Metabolic encephalopathy;Alzheimer's    Pulmonary/Critical Care Pneumonia              Nutrition/Diet History       01/04/18 1400    Nutrition/Diet History    Factors Affecting Nutritional Intake Factors    Reported/Observed By Family    Appetite Good   prior to admit to rehab facility pt was eating well.    Mental State/Condition Altered sleep/wake pattern;Impaired Cognitive Status/Motor Control;Too drowsy to eat    Other MD to discuss goals of nuourishment with wife if fails swallow eval            Anthropometrics       01/04/18 1400    Anthropometrics    RD Documented Weight on Admission 72.8 kg (160 lb 7.9 oz)    Body Mass Index (BMI)    BMI Grade 19.1 - 24.9 - normal            Labs/Tests/Procedures/Meds       01/04/18 1401    Labs/Tests/Procedures/Meds    Diagnostic Test/Procedure Review reviewed, pertinent    Labs/Tests Review Reviewed;Na+    Procedure Review SLP    Swallow eval status Pending   would not wake up enough to participate    Type of SLP Evaluation Bedside    Medication Review Reviewed, pertinent;Anticoag;Antibiotic   remeron    Significant Vitals reviewed            Physical Findings       01/04/18 1402    Physical Findings/Assessment    Additional Documentation Physical Appearance (Group)    Physical Appearance    Overall Physical Appearance --   drowsy     Oral/Mouth Cavity --   missig some teeth    Skin --   intact              Nutrition Prescription Ordered       01/04/18 1403    Nutrition Prescription PO    Current PO Diet NPO            Evaluation of Received Nutrient/Fluid Intake       01/04/18 1403    Evaluation of Received Nutrient/Fluid Intake    Nutrition Delivered Fluid Evaluation    Fluid Intake Evaluation    IV Fluid (mL) 3000    Total Fluid Intake (mL) 3000            Problem/Interventions:        Problem 1       01/04/18 1407    Nutrition Diagnoses Problem 1    Problem 1 Inadequate Intake/Infusion    Inadequate Intake Type Oral    Macronutrient Kcal;Protein    Etiology (related to) Medical Diagnosis    Neurological AMS;Metabolic encephalopathy;Dementia;Alzheimer's;Parkinson's                    Intervention Goal       01/04/18 1408    Intervention Goal    General Maintain nutrition;Reduce/improve symptoms;Meet nutritional needs for age/condition;Disease management/therapy    PO Initiate feeding;Tolerate PO;PO intake (%)    PO Intake % 75 %    Weight Maintain weight            Nutrition Intervention       01/04/18 1408    Nutrition Intervention    RD/Tech Action Care plan reviewd;Follow Tx progress;Await begin PO              Education/Evaluation       01/04/18 1408    Education    Education Will Instruct as appropriate    Monitor/Evaluation    Monitor Per protocol;Symptoms;I&O;PO intake;Pertinent labs;Skin status;Weight        Electronically signed by:  Payal Drew RD  01/04/18 2:08 PM

## 2018-01-04 NOTE — NURSING NOTE
Discussed cortrak tube with wife after speech eval.  Wife unsure if she wants this measure taken.  Visibly upset and states she will speak with her daughter before making a decision.  Will monitor.  Spouse is not ready to discuss palliative care at this time.

## 2018-01-04 NOTE — PROGRESS NOTES
"Pharmacy Consult - Zosyn Dosing     Alexandro Hallman has a consult for pharmacy to dose Zosyn for pneumonia.  Pharmacy dosing Zosyn per Dr. Henao's request.       Relevant clinical data and objective history reviewed:  77 y.o. male 190.5 cm (75\") 72.8 kg (160 lb 6.4 oz)    Past Medical History:   Diagnosis Date   • Abnormal TSH 12/2008   • Alzheimer's dementia    • Anorexia 02/28/2008   • Arthritis    • Arthritis of shoulder 04/01/2014   • Arthropathy of pelvic region and thigh    • BPH (benign prostatic hyperplasia)    • Bursitis    • Cholelithiasis 03/27/2008   • Chronic constipation    • Colonic polyp    • Communicating hydrocephalus    • Constipation    • Contusion of thigh, left 02/19/2014   • COPD (chronic obstructive pulmonary disease)    • DDD (degenerative disc disease), cervical    • Dementia     08/14/2012   • Depression 05/02/2012   • Diverticulosis    • Eczema    • Glossitis 09/17/2009   • Hypertension 12/03/2007   • Hypothyroidism 03/14/2011   • Ingrown nail 12/2007   • Lumbar stenosis    • Lumbar stenosis    • Pneumonia    • Right shoulder tendonitis 02/29/2014   • Shortness of breath      Creatinine   Date Value Ref Range Status   01/03/2018 1.09 0.76 - 1.27 mg/dL Final   01/02/2018 0.98 0.76 - 1.27 mg/dL Final   01/01/2018 0.93 0.76 - 1.27 mg/dL Final     BUN   Date Value Ref Range Status   01/03/2018 19 8 - 23 mg/dL Final     Estimated Creatinine Clearance: 57.5 mL/min (by C-G formula based on Cr of 1.09).    Lab Results   Component Value Date    WBC 11.62 (H) 01/03/2018     Temp Readings from Last 3 Encounters:   01/04/18 97.7 °F (36.5 °C) (Oral)   01/02/18 96.8 °F (36 °C) (Oral)   11/07/17 96.9 °F (36.1 °C)          Assessment/Plan  Will start Zosyn 3.375g IV q8h. Pharmacy will continue to follow daily while on Zosyn and adjust as needed.     Shi Victoria, [facility]    "

## 2018-01-05 PROBLEM — G92.9 TOXIC ENCEPHALOPATHY: Status: ACTIVE | Noted: 2018-01-01

## 2018-01-05 PROBLEM — G93.41 METABOLIC ENCEPHALOPATHY: Status: ACTIVE | Noted: 2018-01-01

## 2018-01-05 NOTE — PLAN OF CARE
Problem: Patient Care Overview (Adult)  Goal: Plan of Care Review  Outcome: Ongoing (interventions implemented as appropriate)    Goal: Adult Individualization and Mutuality  Outcome: Ongoing (interventions implemented as appropriate)    Goal: Discharge Needs Assessment  Outcome: Ongoing (interventions implemented as appropriate)   01/04/18 1037 01/04/18 1636 01/05/18 1117   Discharge Needs Assessment   Concerns To Be Addressed --  basic needs concerns;discharge planning concerns --    Readmission Within The Last 30 Days previous discharge plan unsuccessful --  --    Discharge Disposition --  --  --    Current Health   Outpatient/Agency/Support Group Needs homecare agency (specify level of care);skilled nursing facility (specify) --  --    Anticipated Changes Related to Illness inability to care for self --  --    Self-Care   Equipment Currently Used at Home --  --  walker, rolling    01/05/18 1553   Discharge Needs Assessment   Concerns To Be Addressed --    Readmission Within The Last 30 Days --    Discharge Disposition still a patient   Current Health   Outpatient/Agency/Support Group Needs --    Anticipated Changes Related to Illness --    Self-Care   Equipment Currently Used at Home --        Problem: Confusion, Acute (Adult)  Goal: Identify Related Risk Factors and Signs and Symptoms  Outcome: Ongoing (interventions implemented as appropriate)   01/04/18 1636   Confusion, Acute   Related Risk Factors (Acute Confusion) dehydration;malnutrition     Goal: Cognitive/Functional Impairments Minimized  Outcome: Ongoing (interventions implemented as appropriate)   01/05/18 1553   Confusion, Acute (Adult)   Cognitive/Functional Impairments Minimized making progress toward outcome     Goal: Safety  Outcome: Ongoing (interventions implemented as appropriate)   01/05/18 1553   Confusion, Acute (Adult)   Safety making progress toward outcome       Problem: Skin Integrity Impairment, Risk/Actual (Adult)  Goal: Identify  Related Risk Factors and Signs and Symptoms  Outcome: Ongoing (interventions implemented as appropriate)   01/05/18 1553   Skin Integrity Impairment, Risk/Actual   Skin Integrity Impairment, Risk/Actual: Related Risk Factors age extremes     Goal: Skin Integrity/Wound Healing  Outcome: Ongoing (interventions implemented as appropriate)   01/05/18 1553   Skin Integrity Impairment, Risk/Actual (Adult)   Skin Integrity/Wound Healing making progress toward outcome       Problem: Fall Risk (Adult)  Goal: Identify Related Risk Factors and Signs and Symptoms  Outcome: Ongoing (interventions implemented as appropriate)   01/04/18 1636 01/05/18 0335   Fall Risk   Fall Risk: Related Risk Factors age-related changes;confusion/agitation;gait/mobility problems --    Fall Risk: Signs and Symptoms --  presence of risk factors     Goal: Absence of Falls  Outcome: Ongoing (interventions implemented as appropriate)   01/05/18 1553   Fall Risk (Adult)   Absence of Falls making progress toward outcome       Problem: Pressure Ulcer Risk (Jesse Scale) (Adult,Obstetrics,Pediatric)  Goal: Identify Related Risk Factors and Signs and Symptoms  Outcome: Ongoing (interventions implemented as appropriate)   01/05/18 1553   Pressure Ulcer Risk (Jesse Scale)   Related Risk Factors (Pressure Ulcer Risk (Jesse Scale)) cognitive impairment;age extremes;nutritional deficiencies;mobility impaired

## 2018-01-05 NOTE — PROGRESS NOTES
Continued Stay Note  [facility]     Patient Name: Alexandro Hallman  MRN: [id number]  Today's Date: 1/5/2018    Admit Date: 1/3/2018          Discharge Plan       01/05/18 1045    Case Management/Social Work Plan    Plan Home with wife and family assistance and [facility] and probable private pay assistance- will need wheelchair at discharge    Patient/Family In Agreement With Plan yes    Additional Comments Spoke with wife, Mariam, at bedside and she adamantly refuses SNF and is insistant on patient returning home with her and family assistance and [facility](Kamille notified at ext. [phone]) and planning to hire private care assistance.  Wife would like MD to order a wheelchair from [employer] and [employer] price quoted by Conchita with [employer] at $5.50/month rental for standard wheelchair and $10.00/month rental for lighweight wheelchair and she is agreeable with price.  CCP will continue to follow and assist as needed......Shala Celaya RN,CCP               Discharge Codes     None            Shala Celaya RN

## 2018-01-05 NOTE — PROGRESS NOTES
"     LOS: 2 days   Primary Care Physician: Buster Howe MD     Subjective   Wife, daughter and granddaughter at the bedside.  Patient sleeping but he does awaken.  He's confused.  Not following commands very well.    Vital Signs  Body mass index is 20.05 kg/(m^2).  Temp:  [97.4 °F (36.3 °C)-98.6 °F (37 °C)] 97.4 °F (36.3 °C)  Heart Rate:  [58-65] 58  Resp:  [16-18] 16  BP: (111-157)/() 111/74      Objective:  General Appearance:  In no acute distress (Elderly and chronically ill-appearing.).    Vital signs: (most recent): Blood pressure 111/74, pulse 58, temperature 97.4 °F (36.3 °C), temperature source Oral, resp. rate 16, height 190.5 cm (75\"), weight 72.8 kg (160 lb 6.4 oz), SpO2 97 %.    HEENT: (Dry mucosa.  He's not able to protrude his tongue.  No JVD.  His neck is somewhat stiff but I am able to move it)    Lungs:  There are decreased breath sounds.  No wheezes, rales or rhonchi.    Heart: Normal rate.  Regular rhythm.  No murmur.   Abdomen: Abdomen is soft.  Bowel sounds are normal.   There is no abdominal tenderness.   There is no splenomegaly. There is no hepatomegaly.   Extremities: There is no dependent edema.    Neurological: (Drowsy.  Oriented to self only).          Results Review:    I reviewed the patient's new clinical results.      Results from last 7 days  Lab Units 01/05/18  0336 01/04/18  0553   WBC 10*3/mm3 11.64* 12.05*   HEMOGLOBIN g/dL 12.1* 12.1*   PLATELETS 10*3/mm3 267 274       Results from last 7 days  Lab Units 01/05/18  0336 01/04/18  0553   SODIUM mmol/L 137 135*   POTASSIUM mmol/L 3.7 3.8   CHLORIDE mmol/L 99 99   CO2 mmol/L 23.3 21.6*   BUN mg/dL 20 22   CREATININE mg/dL 1.05 1.03   CALCIUM mg/dL 8.5* 8.4*   GLUCOSE mg/dL 71 66       Results from last 7 days  Lab Units 01/03/18  1932   INR  1.22*     Hemoglobin A1C:No results found for: HGBA1C    Glucose Range:  Glucose   Date/Time Value Ref Range Status   01/04/2018 1059 81 70 - 130 mg/dL Final       Lab Results "   Component Value Date    WUHSPLJM19 434 04/20/2017       Lab Results   Component Value Date    TSH 6.080 (H) 07/17/2017       Assessment & Plan      Medication Review: Yes    Active Hospital Problems (** Indicates Principal Problem)    Diagnosis Date Noted   • **Altered mental status [R41.82] 01/03/2018   • Acute metabolic encephalopathy [G93.41] 01/01/2018   • Pneumonia [J18.9] 12/31/2017   • Generalized weakness [R53.1] 12/30/2017   • Parkinson's disease [G20] 09/13/2016   • Communicating hydrocephalus [G91.0] 07/12/2016   • Essential hypertension [I10] 01/11/2016   • Hypothyroidism [E03.9] 01/11/2016   • Central spinal stenosis [M48.00] 12/10/2015   • Alzheimer's type dementia [G30.9] 11/18/2015      Resolved Hospital Problems    Diagnosis Date Noted Date Resolved   No resolved problems to display.       Assessment/Plan  1.  Metabolic and toxic encephalopathies, multifactorial.  He has underlying dementia and probably some intermittent aspiration/dysphagia.  Dc respirdol.  Avoid sedating agents.  Continue IV antibiotics for previously diagnosed pneumonia.  Continue IV fluids.  Check bladder scan to rule out urinary retention.  Speech therapy to see  2.  History of Parkinson's disease and Alzheimer's dementia.  He is not on Sinemet or similar.  He is on Namenda and Aricept.  3.  Weakness, secondary to above.  Discussed with wife.  Patient may not return to previous level of functioning for quite some time if at all.  Continue with physical therapy    Angelic Croft MD  01/05/18  10:09 AM

## 2018-01-05 NOTE — PLAN OF CARE
Problem: Patient Care Overview (Adult)  Goal: Plan of Care Review  Outcome: Ongoing (interventions implemented as appropriate)   01/05/18 1325   Coping/Psychosocial Response Interventions   Plan Of Care Reviewed With patient;spouse   Patient Care Overview   Progress no change   Outcome Evaluation   Outcome Summary/Follow up Plan Bedside Swallow attempted. Swallow delayed, not safe for PO diet at this time. Will reassess 1/6.

## 2018-01-05 NOTE — PLAN OF CARE
Problem: Patient Care Overview (Adult)  Goal: Plan of Care Review   01/05/18 1123   Coping/Psychosocial Response Interventions   Plan Of Care Reviewed With patient;family   Outcome Evaluation   Outcome Summary/Follow up Plan Pt. will benefit from skilled inpt. P.T. to address his functional deficits and to assist pt. in regaining his maximum level of independence with functional mobility. Pt.'s progression will depend on his ability to follow commands and cooperate with P.T.       Problem: Inpatient Physical Therapy  Goal: Bed Mobility Goal LTG- PT   01/05/18 1123   Bed Mobility PT LTG   Bed Mobility PT LTG, Date Established 01/05/18   Bed Mobility PT LTG, Time to Achieve 2 wks   Bed Mobility PT LTG, Activity Type all bed mobility   Bed Mobility PT LTG, [place] Level minimum assist (75% patient effort);2 person assist required     Goal: Transfer Training Goal 1 LTG- PT   01/05/18 1123   Transfer Training PT LTG   Transfer Training PT LTG, Date Established 01/05/18   Transfer Training PT LTG, Time to Achieve 2 wks   Transfer Training PT LTG, Activity Type sit to stand/stand to sit   Transfer Training PT LTG, [place] Level minimum assist (75% patient effort);2 person assist required   Transfer Training PT LTG, Assist Device walker, rolling     Goal: Transfer Training Goal 2 LTG- PT   01/05/18 1123   Transfer Training 2 PT LTG   Transfer Training PT 2 LTG, Date Established 01/05/18   Transfer Training PT 2 LTG, Time to Achieve 2 wks   Transfer Training PT 2 LTG, Activity Type bed to chair /chair to bed   Transfer Training PT 2 LTG, [place] Level minimum assist (75% patient effort);2 person assist required   Transfer Training PT 2 LTG, Assist Device walker, rolling     Goal: Gait Training Goal LTG- PT   01/05/18 1123   Gait Training PT LTG   Gait Training Goal PT LTG, Date Established 01/05/18   Gait Training Goal PT LTG, Time to Achieve 2 wks   Gait Training Goal PT LTG, [place] Level minimum assist  (75% patient effort);2 person assist required   Gait Training Goal PT LTG, Assist Device walker, rolling   Gait Training Goal PT LTG, Distance to Achieve 30 feet

## 2018-01-05 NOTE — PROGRESS NOTES
Patient Identification:  NAME:  Alexandro Hallman  Age:  77 y.o.   Sex:  male   :  1940   MRN:  [id number]       Chief complaint: Metabolic encephalopathy vascular dementia parkinsonism    History of present illness:  He remains lethargic he is had an MRI scan and by my independent eyeball review appears to be normal for his age white matter changes only he does have pneumonia and that is being checked his sodium is good and he is not on Sinemet.  He is still getting Namenda and Aricept       Past medical history:  Past Medical History:   Diagnosis Date   • Abnormal TSH 2008   • Alzheimer's dementia    • Anorexia 2008   • Arthritis    • Arthritis of shoulder 2014   • Arthropathy of pelvic region and thigh    • BPH (benign prostatic hyperplasia)    • Bursitis    • Cholelithiasis 2008   • Chronic constipation    • Colonic polyp    • Communicating hydrocephalus    • Constipation    • Contusion of thigh, left 2014   • COPD (chronic obstructive pulmonary disease)    • DDD (degenerative disc disease), cervical    • Dementia     2012   • Depression 2012   • Diverticulosis    • Eczema    • Glossitis 2009   • Hypertension 2007   • Hypothyroidism 2011   • Ingrown nail 2007   • Lumbar stenosis    • Lumbar stenosis    • Pneumonia    • Right shoulder tendonitis    • Shortness of breath        Allergies:  Factive [gemifloxacin]; Fluticasone-salmeterol; and Cefdinir    Home medications:  Prescriptions Prior to Admission   Medication Sig Dispense Refill Last Dose   • amLODIPine (NORVASC) 2.5 MG tablet Take 1 tablet by mouth Daily. 30 tablet 3    • amoxicillin-clavulanate (AUGMENTIN) 875-125 MG per tablet Take 1 tablet by mouth Every 12 (Twelve) Hours for 12 doses. Indications: Pneumonia 12 tablet 0    • aspirin 81 MG chewable tablet Chew 81 mg daily.   Taking   • cholecalciferol (VITAMIN D3) 1000 UNITS tablet Take 2,000 Units by mouth 2 (Two) Times a  Day.   Taking   • donepezil (ARICEPT) 23 MG tablet Take 23 mg by mouth Every Night.   Taking   • isosorbide mononitrate (IMDUR) 30 MG 24 hr tablet Take 1 tablet by mouth Daily. 30 tablet 3    • levothyroxine (SYNTHROID, LEVOTHROID) 50 MCG tablet Take 1 tablet by mouth Daily for 180 days. 90 tablet 1 Taking   • memantine (NAMENDA XR) 28 MG capsule sustained-release 24 hr extended release capsule Take 28 mg by mouth Daily.      • mirtazapine (REMERON) 30 MG tablet Take 30 mg by mouth Daily.   Taking   • polyethylene glycol (GLYCOLAX) powder Take 17 g by mouth As Needed.   Taking   • pramipexole (MIRAPEX) 0.125 MG tablet Take 0.125 mg by mouth 3 (Three) Times a Day.      • risperiDONE (risperDAL) 1 MG tablet Take 1 mg by mouth Every Night.   Taking   • tamsulosin (FLOMAX) 0.4 MG capsule 24 hr capsule Take 1 capsule by mouth Every Night for 180 days. 90 capsule 1 Taking   • Umeclidinium-Vilanterol (ANORO ELLIPTA) 62.5-25 MCG/INH aerosol powder  Inhale 1 puff Daily.   Taking        Hospital medications:    amLODIPine 2.5 mg Oral Daily   aspirin 81 mg Oral Daily   cholecalciferol 2,000 Units Oral Daily   donepezil 20 mg Oral Nightly   enoxaparin 40 mg Subcutaneous Nightly   isosorbide mononitrate 30 mg Oral Daily   levothyroxine 50 mcg Oral Q AM   memantine 10 mg Oral Q12H   piperacillin-tazobactam 3.375 g Intravenous Q8H   tamsulosin 0.4 mg Oral Daily       Pharmacy to Dose Zosyn     sodium chloride 0.9 % with KCl 20 mEq 100 mL/hr Last Rate: 100 mL/hr (01/04/18 1424)     •  acetaminophen  •  nitroglycerin  •  ondansetron  •  Pharmacy to Dose Zosyn  •  polyethylene glycol  •  sodium chloride  •  sodium chloride  •  Insert peripheral IV **AND** sodium chloride  •  sodium chloride      Objective:  Vitals Ranges:   Temp:  [97.4 °F (36.3 °C)-98.6 °F (37 °C)] 97.6 °F (36.4 °C)  Heart Rate:  [58-65] 62  Resp:  [16-18] 16  BP: (111-146)/(74-90) 135/84      Physical Exam:  Very lethargic today he does awaken some to pain he has  rigidity bradykinesia reflexes trace throughout symmetrical neck moderately rigid pupils equally round reactive although he resists eye opening    Results review:   I reviewed the patient's new clinical results.    Data review:  Lab Results (last 24 hours)     Procedure Component Value Units Date/Time    CBC (No Diff) [[id number]]  (Abnormal) Collected:  01/05/18 0336    Specimen:  Blood Updated:  01/05/18 0419     WBC 11.64 (H) 10*3/mm3      RBC 4.32 (L) 10*6/mm3      Hemoglobin 12.1 (L) g/dL      Hematocrit 37.9 (L) %      MCV 87.7 fL      MCH 28.0 pg      MCHC 31.9 (L) g/dL      RDW 13.8 %      RDW-SD 44.4 fl      MPV 9.3 fL      Platelets 267 10*3/mm3     Comprehensive Metabolic Panel [[id number]]  (Abnormal) Collected:  01/05/18 0336    Specimen:  Blood Updated:  01/05/18 0437     Glucose 71 mg/dL      BUN 20 mg/dL      Creatinine 1.05 mg/dL      Sodium 137 mmol/L      Potassium 3.7 mmol/L      Chloride 99 mmol/L      CO2 23.3 mmol/L      Calcium 8.5 (L) mg/dL      Total Protein 6.0 g/dL      Albumin 3.00 (L) g/dL      ALT (SGPT) 9 U/L      AST (SGOT) 16 U/L      Alkaline Phosphatase 109 U/L      Total Bilirubin 0.7 mg/dL      eGFR Non African Amer 68 mL/min/1.73      Globulin 3.0 gm/dL      A/G Ratio 1.0 g/dL      BUN/Creatinine Ratio 19.0     Anion Gap 14.7 mmol/L     Narrative:       The MDRD GFR formula is only valid for adults with stable renal function between ages 18 and 70.    Magnesium [[id number]]  (Normal) Collected:  01/05/18 0336    Specimen:  Blood Updated:  01/05/18 0437     Magnesium 2.0 mg/dL            Imaging:  Imaging Results (last 24 hours)     Procedure Component Value Units Date/Time    MRI Brain With & Without Contrast [[id number]] Collected:  01/04/18 1646     Updated:  01/05/18 0619    Narrative:       MRI OF THE BRAIN WITH AND WITHOUT CONTRAST     CLINICAL HISTORY: Confusion and weakness for 2 days. History of  communicating hydrocephalus and dementia.     MRI of the brain was obtained  with sagittal T1, axial pre and  postgadolinium T1, coronal postgadolinium T1, axial FLAIR, axial T2,  axial diffusion, and axial gradient echo images.     FINDINGS:     There are no abnormal areas of restricted diffusion. The major  intracranial flow related signal voids are within normal limits.  Extensive changes of chronic small vessel ischemic phenomena are noted.  There are no abnormal areas of susceptibility artifact. No abnormal  areas of contrast enhancement are noted.       Impression:          No evidence for acute intracranial pathology.     This report was finalized on 1/5/2018 6:16 AM by Dr. Raum Khan MD.                Assessment and Plan:     He remains with significant metabolic encephalopathy on top of vascular dementia and parkinsonism.  I performed an independent eyeball review the MRI of the brain and it shows no acute stroke.  He does have a diagnosis of pneumonia for which he is being treated and I believe this is enough to explain the severe increase and lethargy.  Continue current antibiotics      Abhishek Cotto MD  01/05/18  4:43 PM

## 2018-01-05 NOTE — PLAN OF CARE
Problem: Inpatient SLP  Goal: Dysphagia- Patient will safely consume diet as per recommendation with no signs/symptoms of aspiration  Outcome: Ongoing (interventions implemented as appropriate)   01/05/18 1325   Safely Consume Diet   Safely Consume Diet- SLP, Date Established 01/05/18   Safely Consume Diet- SLP, Time to Achieve by discharge

## 2018-01-05 NOTE — THERAPY EVALUATION
Acute Care - Physical Therapy Initial Evaluation  [facility]     Patient Name: Alexandro Hallman  : 1940  MRN: [id number]  Today's Date: 2018   Onset of Illness/Injury or Date of Surgery Date: 18  Date of Referral to PT: 18  Referring Physician: Iona Toro      Admit Date: 1/3/2018     Visit Dx:    ICD-10-CM ICD-9-CM   1. Altered mental status, unspecified altered mental status type R41.82 780.97   2. Acute metabolic encephalopathy G93.41 348.31   3. Left pulmonary infiltrate on CXR R91.8 793.19     Patient Active Problem List   Diagnosis   • Alzheimer's type dementia   • Central spinal stenosis   • Essential hypertension   • Hypothyroidism   • Benign prostatic hypertrophy (BPH) with incomplete bladder emptying   • Communicating hydrocephalus   • Parkinson's disease   • Gait instability   • Weakness   • Hallucinations, unspecified   • Generalized weakness   • Pneumonia   • Acute metabolic encephalopathy   • Altered mental status   • Metabolic encephalopathy   • Toxic encephalopathy     Past Medical History:   Diagnosis Date   • Abnormal TSH 2008   • Alzheimer's dementia    • Anorexia 2008   • Arthritis    • Arthritis of shoulder 2014   • Arthropathy of pelvic region and thigh    • BPH (benign prostatic hyperplasia)    • Bursitis    • Cholelithiasis 2008   • Chronic constipation    • Colonic polyp    • Communicating hydrocephalus    • Constipation    • Contusion of thigh, left 2014   • COPD (chronic obstructive pulmonary disease)    • DDD (degenerative disc disease), cervical    • Dementia     2012   • Depression 2012   • Diverticulosis    • Eczema    • Glossitis 2009   • Hypertension 2007   • Hypothyroidism 2011   • Ingrown nail 2007   • Lumbar stenosis    • Lumbar stenosis    • Pneumonia    • Right shoulder tendonitis    • Shortness of breath      Past Surgical History:   Procedure Laterality Date   • BACK SURGERY   "   • CATARACT EXTRACTION, BILATERAL     • COLONOSCOPY  11/04/2011   • FOOT SURGERY Left 1970    spur repair   • FOOT SURGERY Right 1993    spur   • HERNIA REPAIR  1969    double hernia repair   • KNEE SURGERY  1967    Left Knee   • SPINE SURGERY  1997    disc repair          PT ASSESSMENT (last 72 hours)      PT Evaluation       01/05/18 1117 01/04/18 1619    Rehab Evaluation    Document Type evaluation  -MS     Subjective Information agree to therapy   Pt. reports \"yes\" to working with therapy this AM.  -MS     Evaluation Not Performed  patient unavailable for evaluation   off floor to MRI, follow up tomorrow.  -EJ    Patient Effort, Rehab Treatment fair  -MS     Symptoms Noted Comment Pt. opens his eyes once mobility is initiated but he is not very verbal in communication other than mumbling at times and reporting \"yes\" to working with P.T.  -MS     General Information    Onset of Illness/Injury or Date of Surgery Date 01/03/18  -MS     Referring Physician Iona Toro  -MS     Pertinent History Of Current Problem Pt. admitted with acute metabolic encephalopathy; PMH: Dementia; Parkinson's   -MS     Precautions/Limitations fall precautions   Exit alarm; Dec. skin integrity  -MS     Prior Level of Function --   Amb. with RWX and assist from wife, per family report  -MS     Equipment Currently Used at Home walker, rolling  -MS     Plans/Goals Discussed With family  -MS     Risks Reviewed family:  -MS     Benefits Reviewed family:  -MS     Barriers to Rehab none identified  -MS     Clinical Impression    Date of Referral to PT 01/04/18  -MS     Criteria for Skilled Therapeutic Interventions Met treatment indicated  -MS     Rehab Potential fair, will monitor progress closely  -MS     Pain Assessment    Pain Assessment [place] FACES  -MS     [place] FACES Pain Rating --   No verbal/visual signs of pain.  -MS     Cognitive Assessment/Intervention    Current Cognitive/Communication Assessment impaired  -MS     " Orientation Status unable/difficult to assess  -MS     Follows Commands/Answers Questions 25% of the time;able to follow single-step instructions;needs cueing;needs increased time;needs repetition  -MS     Personal Safety Interventions fall prevention program maintained;gait belt;nonskid shoes/slippers when out of bed;supervised activity  -MS     ROM (Range of Motion)    General ROM --   AAROM BUE/LE (Imp. 25%); Would not follow AROM  -MS     MMT (Manual Muscle Testing)    General MMT Assessment --   BUE/LE MMT (3-/5)  -MS     Bed Mobility, Assessment/Treatment    Bed Mobility, Assistive Device draw sheet  -MS     Bed Mob, Supine to Sit, [place] moderate assist (50% patient effort);2 person assist required  -MS     Bed Mob, Sit to Supine, [place] maximum assist (25% patient effort);2 person assist required  -MS     Bed Mobility, Comment Precautions taken to help minimize/eliminate all friction/shearing forces to pt.'s skin during mobility.  -MS     Transfer Assessment/Treatment    Transfers, Sit-Stand [place] maximum assist (25% patient effort);2 person assist required  -MS     Transfers, Stand-Sit [place] maximum assist (25% patient effort);2 person assist required  -MS     Transfers, Sit-Stand-Sit, Assist Device rolling walker  -MS     Transfer, Comment Attempted to stand x 2 at bedside but pt. barely able to clear his bottom off of the bed despite bilateral knees/feet being blocked   -MS     Gait Assessment/Treatment    Gait, [place] Level unable to perform;not appropriate to assess  -MS     Therapy Exercises    Bilateral Lower Extremities AAROM:;5 reps;sitting;ankle pumps/circles;hip flexion;LAQ  -MS     Positioning and Restraints    Pre-Treatment Position in bed  -MS     Post Treatment Position bed  -MS     In Bed notified nsg;supine;call light within reach;encouraged to call for assist;exit alarm on;with family/caregiver   All lines intact. V.S.S.  -MS       01/04/18 1108 01/04/18 1037     Rehab Evaluation    Evaluation Not Performed other (see comments)   Unable to wake patient up for eval.  -JJ     General Information    Equipment Currently Used at Home  walker, rolling;shower chair  -    Living Environment    Lives With  spouse  -    Living Arrangements  house   2 story  -    Home Accessibility  bath not on first floor;stairs within home;stairs to enter home  -    Number of Stairs to Enter Home  3  -SM    Number of Stairs Within Home  12  -SM    Stair Railings at Home  inside, present on right side;outside, present on right side  -    Type of Financial/Environmental Concern  none  -    Transportation Available  ambulance;car;family or friend will provide  -      01/04/18 0028 01/04/18 0020    General Information    Equipment Currently Used at Home  walker, rolling  -    Living Environment    Lives With spouse  -     Living Arrangements house  -     Home Accessibility bed and bath on same level  -     Stair Railings at Home inside, present on right side  -     Type of Financial/Environmental Concern none  -     Transportation Available ambulance  -       User Key  (r) = Recorded By, (t) = Taken By, (c) = Cosigned By    Initials Name Provider Type    BERRY Corado, MS CCC-SLP Speech and Language Pathologist     Shala Celaya, RN Case Manager    MS Jarvis Thomas, PT Physical Therapist    URBAN Fay, PT Physical Therapist    ERIKA Russell, RN Registered Nurse          Physical Therapy Education     Title: PT OT SLP Therapies (Active)     Topic: Physical Therapy (Active)     Point: Mobility training (Active)    Learning Progress Summary    Learner Readiness Method Response Comment Documented by Status   Patient Nonacceptance E,D NR  MS 01/05/18 1123 Active               Point: Home exercise program (Active)    Learning Progress Summary    Learner Readiness Method Response Comment Documented by Status   Patient Nonacceptance E,D  NR  MS 01/05/18 1123 Active               Point: Body mechanics (Active)    Learning Progress Summary    Learner Readiness Method Response Comment Documented by Status   Patient Nonacceptance E,D NR  MS 01/05/18 1123 Active               Point: Precautions (Active)    Learning Progress Summary    Learner Readiness Method Response Comment Documented by Status   Patient Nonacceptance E,D NR  MS 01/05/18 1123 Active                      User Key     Initials Effective Dates Name Provider Type Discipline    MS 12/01/15 -  Jarvis Thomas, PT Physical Therapist PT                PT Recommendation and Plan  Anticipated Discharge Disposition: skilled nursing facility  Planned Therapy Interventions: balance training, bed mobility training, gait training, home exercise program, patient/family education, postural re-education, ROM (Range of Motion), strengthening, transfer training  PT Frequency: daily  Plan of Care Review  Plan Of Care Reviewed With: patient, family  Outcome Summary/Follow up Plan: Pt. will benefit from skilled inpt. P.T. to address his functional deficits and to assist pt. in regaining his maximum level of independence with functional mobility.  Pt.'s progression will depend on his ability to follow commands and cooperate with P.T.          IP PT Goals       01/05/18 1123          Bed Mobility PT LTG    Bed Mobility PT LTG, Date Established 01/05/18  -MS      Bed Mobility PT LTG, Time to Achieve 2 wks  -MS      Bed Mobility PT LTG, Activity Type all bed mobility  -MS      Bed Mobility PT LTG, [place] Level minimum assist (75% patient effort);2 person assist required  -MS      Transfer Training PT LTG    Transfer Training PT LTG, Date Established 01/05/18  -MS      Transfer Training PT LTG, Time to Achieve 2 wks  -MS      Transfer Training PT LTG, Activity Type sit to stand/stand to sit  -MS      Transfer Training PT LTG, [place] Level minimum assist (75% patient effort);2 person assist required   -MS      Transfer Training PT LTG, Assist Device walker, rolling  -MS      Transfer Training 2 PT LTG    Transfer Training PT 2 LTG, Date Established 01/05/18  -MS      Transfer Training PT 2 LTG, Time to Achieve 2 wks  -MS      Transfer Training PT 2 LTG, Activity Type bed to chair /chair to bed  -MS      Transfer Training PT 2 LTG, [place] Level minimum assist (75% patient effort);2 person assist required  -MS      Transfer Training PT 2 LTG, Assist Device walker, rolling  -MS      Gait Training PT LTG    Gait Training Goal PT LTG, Date Established 01/05/18  -MS      Gait Training Goal PT LTG, Time to Achieve 2 wks  -MS      Gait Training Goal PT LTG, [place] Level minimum assist (75% patient effort);2 person assist required  -MS      Gait Training Goal PT LTG, Assist Device walker, rolling  -MS      Gait Training Goal PT LTG, Distance to Achieve 30 feet  -MS        User Key  (r) = Recorded By, (t) = Taken By, (c) = Cosigned By    Initials Name Provider Type    MS Jarvis Thomas, PT Physical Therapist                Outcome Measures       01/05/18 1100          How much help from another person do you currently need...    Turning from your back to your side while in flat bed without using bedrails? 2  -MS      Moving from lying on back to sitting on the side of a flat bed without bedrails? 2  -MS      Moving to and from a bed to a chair (including a wheelchair)? 1  -MS      Standing up from a chair using your arms (e.g., wheelchair, bedside chair)? 1  -MS      Climbing 3-5 steps with a railing? 1  -MS      To walk in hospital room? 1  -MS      AM-PAC 6 Clicks Score 8  -MS      Functional Assessment    Outcome Measure Options AM-PAC 6 Clicks Basic Mobility (PT)  -MS        User Key  (r) = Recorded By, (t) = Taken By, (c) = Cosigned By    Initials Name Provider Type    MS Jarvis Thomas, PT Physical Therapist           Time Calculation:         PT Charges       01/05/18 1129          Time Calculation     Start Time 1102  -MS      Stop Time 1116  -MS      Time Calculation (min) 14 min  -MS      PT Received On 01/05/18  -MS      PT - Next Appointment 01/07/18  -MS      PT Goal Re-Cert Due Date 01/19/18  -MS        User Key  (r) = Recorded By, (t) = Taken By, (c) = Cosigned By    Initials Name Provider Type    MS Jarvis Thomas, PT Physical Therapist          Therapy Charges for Today     Code Description Service Date Service Provider Modifiers Qty    42056673330 HC PT EVAL MOD COMPLEXITY 2 1/5/2018 Jarvis Thomas, PT GP 1    58114460661 HC PT THER PROC EA 15 MIN 1/5/2018 Jarvis Thomas, PT GP 1    01890158423 HC PT THER SUPP EA 15 MIN 1/5/2018 Jarvis Thomas, PT GP 1          PT G-Codes  Outcome Measure Options: AM-PAC 6 Clicks Basic Mobility (PT)      Jarvis Thomas, PT  1/5/2018

## 2018-01-05 NOTE — PLAN OF CARE
Problem: Patient Care Overview (Adult)  Goal: Plan of Care Review  Outcome: Ongoing (interventions implemented as appropriate)   01/05/18 0335   Coping/Psychosocial Response Interventions   Plan Of Care Reviewed With patient   Patient Care Overview   Progress no change   Outcome Evaluation   Outcome Summary/Follow up Plan Pt has been resting in the bed. Pt wasnt able to answer questions but did follow comands. Can not understand words. Pt has had good urinary output. PT still NPO and recieving fluids and IV antibiotics. Will continue to monitor.      Goal: Discharge Needs Assessment  Outcome: Ongoing (interventions implemented as appropriate)      Problem: Confusion, Acute (Adult)  Goal: Identify Related Risk Factors and Signs and Symptoms  Outcome: Ongoing (interventions implemented as appropriate)    Goal: Cognitive/Functional Impairments Minimized  Outcome: Ongoing (interventions implemented as appropriate)    Goal: Safety  Outcome: Ongoing (interventions implemented as appropriate)      Problem: Skin Integrity Impairment, Risk/Actual (Adult)  Goal: Identify Related Risk Factors and Signs and Symptoms  Outcome: Ongoing (interventions implemented as appropriate)    Goal: Skin Integrity/Wound Healing  Outcome: Ongoing (interventions implemented as appropriate)      Problem: Fall Risk (Adult)  Goal: Identify Related Risk Factors and Signs and Symptoms  Outcome: Ongoing (interventions implemented as appropriate)    Goal: Absence of Falls  Outcome: Ongoing (interventions implemented as appropriate)      Problem: Pressure Ulcer Risk (Jesse Scale) (Adult,Obstetrics,Pediatric)  Goal: Identify Related Risk Factors and Signs and Symptoms  Outcome: Ongoing (interventions implemented as appropriate)    Goal: Skin Integrity  Outcome: Ongoing (interventions implemented as appropriate)

## 2018-01-05 NOTE — THERAPY EVALUATION
Acute Care - Speech Language Pathology   Swallow Initial Evaluation [facility]     Patient Name: Alexandro Hallman  : 1940  MRN: [id number]  Today's Date: 2018  Onset of Illness/Injury or Date of Surgery Date: 18            Admit Date: 1/3/2018    SPEECH-LANGUAGE PATHOLOGY EVALUATION - SWALLOW  Subjective: The patient was seen on this date for a Clinical Swallow evaluation.  Patient was somnolent. Pt followed minimal commands, speech mumbled.  Significant history: Pt hospitalized for pneumonia, generalized weakness, and discharged to SNF on 18. Pt readmitted  to hospital with decreased responsiveness, possibly from medications. During previous stay, pt had VFSS on 18 and was put on a [facility] soft diet with thin liquids. Pt now NPO.  Objective: Textures given included ice.  Assessment: Difficulties were noted with ice. Pt's lethargy interferred with eval. Pt had minimal awareness of small ice chips in his mouth. Swallow delayed and sluggish. Pt at high risk of aspiration at this time and not ready for PO diet. Nursing and family reported pt is becoming more responsive at times. ST to f/u tomorrow.  SLP Findings:  Patient presents with mild to moderate oropharyngeal dysphagia, without esophageal component.   Recommendations: Diet Textures: NPO,   Medications should be taken crushed by alternate means. May have small ice chips if fully alert and upright with supervision after oral care.   Recommended Strategies:  Oral care TID  Other Recommended Evaluations: Re-evaluation at bedside    Dysphagia therapy is recommended. Rationale: assess readiness for PO.    Visit Dx:     ICD-10-CM ICD-9-CM   1. Altered mental status, unspecified altered mental status type R41.82 780.97   2. Acute metabolic encephalopathy G93.41 348.31   3. Left pulmonary infiltrate on CXR R91.8 793.19     Patient Active Problem List   Diagnosis   • Alzheimer's type dementia   • Central spinal stenosis   • Essential  hypertension   • Hypothyroidism   • Benign prostatic hypertrophy (BPH) with incomplete bladder emptying   • Communicating hydrocephalus   • Parkinson's disease   • Gait instability   • Weakness   • Hallucinations, unspecified   • Generalized weakness   • Pneumonia   • Acute metabolic encephalopathy   • Altered mental status   • Metabolic encephalopathy   • Toxic encephalopathy     Past Medical History:   Diagnosis Date   • Abnormal TSH 12/2008   • Alzheimer's dementia    • Anorexia 02/28/2008   • Arthritis    • Arthritis of shoulder 04/01/2014   • Arthropathy of pelvic region and thigh    • BPH (benign prostatic hyperplasia)    • Bursitis    • Cholelithiasis 03/27/2008   • Chronic constipation    • Colonic polyp    • Communicating hydrocephalus    • Constipation    • Contusion of thigh, left 02/19/2014   • COPD (chronic obstructive pulmonary disease)    • DDD (degenerative disc disease), cervical    • Dementia     08/14/2012   • Depression 05/02/2012   • Diverticulosis    • Eczema    • Glossitis 09/17/2009   • Hypertension 12/03/2007   • Hypothyroidism 03/14/2011   • Ingrown nail 12/2007   • Lumbar stenosis    • Lumbar stenosis    • Pneumonia    • Right shoulder tendonitis 02/29/2014   • Shortness of breath      Past Surgical History:   Procedure Laterality Date   • BACK SURGERY     • CATARACT EXTRACTION, BILATERAL     • COLONOSCOPY  11/04/2011   • FOOT SURGERY Left 1970    spur repair   • FOOT SURGERY Right 1993    spur   • HERNIA REPAIR  1969    double hernia repair   • KNEE SURGERY  1967    Left Knee   • SPINE SURGERY  1997    disc repair          SWALLOW EVALUATION (last 72 hours)      Swallow Evaluation       01/05/18 1300                Rehab Evaluation    Document Type evaluation  -AW        Subjective Information no complaints;agree to therapy  -AW        Patient Effort, Rehab Treatment adequate  -AW        Symptoms Noted During/After Treatment fatigue  -AW        General Information    Patient Profile  Review yes  -AW        Current Diet Limitations NPO  -AW        Prior Level of Function- Swallowing diet modifications- foods;other (comment)   [facility]  -AW        Plans/Goals Discussed With patient;spouse/S.O.  -AW        Clinical Impression    Patient's Goals For Discharge patient did not state  -AW        Family Goals For Discharge patient able to return to PO diet  -AW        SLP Swallowing Diagnosis pharyngeal dysfunction  -AW        Rehab Potential/Prognosis, Swallowing good, to achieve stated therapy goals  -AW        Criteria for Skilled Therapeutic Interventions Met skilled criteria for dysphagia intervention met  -AW        FCM, Swallowing 1-->Level 1  -AW        Therapy Frequency PRN  -AW        Predicted Duration Therapy Interv (days) until discharge  -AW        Expected Duration Therapy Session (min) 15-30 minutes  -AW        SLP Diet Recommendation NPO: unsafe for food/liquid intake  -AW        Recommended Diagnostics reassess via clinical swallow (non-instrumental exam)  -AW        Recommended Feeding/Eating Techniques maintain upright posture during/after eating for 30 mins;small sips/bites  -AW        SLP Rec. for Method of Medication Administration meds via alternate route  -AW        Monitor For Signs Of Aspiration right lower lobe infiltrates;pneumonia;upper respiratory infection;fever;throat clearing;gurgly voice;elevated WBC count;cough  -AW        Anticipated Discharge Disposition placement for care  -AW        Cognitive Assessment/Intervention    Current Cognitive/Communication Assessment impaired  -AW        Orientation Status oriented to;person  -AW        Follows Commands/Answers Questions 25% of the time;able to follow single-step instructions;needs increased time;needs repetition;speech unintelligible  -AW        Personal Safety decreased awareness, need for safety;decreased awareness, need for assist  -AW        Personal Safety Interventions fall prevention program  maintained  -AW        Oral Motor Structure and Function    Oral Motor Anatomy and Physiology patient demonstrates anatomy that is WNL  -AW        Dentition Assessment missing teeth;other (see comments)   has upper dentures at home, but does not wear  -AW        Volitional Swallow unable to initiate volitional swallow  -AW        Oral Musculature General Assessment other (see comments)   unable to follow directions to assess  -AW          User Key  (r) = Recorded By, (t) = Taken By, (c) = Cosigned By    Initials Name Effective Dates    AW Sonal Shah MS [facility]-SLP 04/13/15 -         EDUCATION  The patient has been educated in the following areas:   Dysphagia (Swallowing Impairment) Oral Care/Hydration NPO rationale.    SLP Recommendation and Plan  SLP Swallowing Diagnosis: pharyngeal dysfunction  SLP Diet Recommendation: NPO: unsafe for food/liquid intake  Recommended Feeding/Eating Techniques: maintain upright posture during/after eating for 30 mins, small sips/bites  SLP Rec. for Method of Medication Administration: meds via alternate route  Monitor For Signs Of Aspiration: right lower lobe infiltrates, pneumonia, upper respiratory infection, fever, throat clearing, gurgly voice, elevated WBC count, cough  Recommended Diagnostics: reassess via clinical swallow (non-instrumental exam)  Criteria for Skilled Therapeutic Interventions Met: skilled criteria for dysphagia intervention met  Anticipated Discharge Disposition: placement for care  Rehab Potential/Prognosis, Swallowing: good, to achieve stated therapy goals  Therapy Frequency: PRN             Plan of Care Review  Plan Of Care Reviewed With: patient, spouse  Progress: no change  Outcome Summary/Follow up Plan: Bedside Swallow attempted. Swallow delayed, not safe for PO diet at this time. Will reassess 1/6.          IP SLP Goals       01/05/18 1325          Safely Consume Diet    Safely Consume Diet- SLP, Date Established 01/05/18  -AW      Safely Consume Diet-  SLP, Time to Achieve by discharge  -AW        User Key  (r) = Recorded By, (t) = Taken By, (c) = Cosigned By    Initials Name Provider Type    PRATIK Shah MS CCC-SLP Speech and Language Pathologist             SLP Outcome Measures (last 72 hours)      SLP Outcome Measures       01/05/18 1300          SLP Outcome Measures    Outcome Measure Used? Adult NOMS  -AW      FCM Scores    FCM Chosen Swallowing  -AW      Swallowing FCM Score 1  -AW        User Key  (r) = Recorded By, (t) = Taken By, (c) = Cosigned By    Initials Name Effective Dates    PRATIK Shah MS CCC-SLP 04/13/15 -            Time Calculation:         Time Calculation- SLP       01/05/18 1326          Time Calculation- SLP    SLP Start Time 1230  -AW      SLP Stop Time 1300  -AW      SLP Time Calculation (min) 30 min  -AW      SLP Received On 01/05/18  -AW        User Key  (r) = Recorded By, (t) = Taken By, (c) = Cosigned By    Initials Name Provider Type    PRATIK Shah MS CCC-SLP Speech and Language Pathologist          Therapy Charges for Today     Code Description Service Date Service Provider Modifiers Qty    82057469562  ST EVAL ORAL PHARYNG SWALLOW 2 1/5/2018 Sonal Shah MS CCC-ZANDER GN 1               MS RICHARD Das  1/5/2018

## 2018-01-06 NOTE — PROGRESS NOTES
Patient Identification:  NAME:  Alexandro Hallman  Age:  77 y.o.   Sex:  male   :  1940   MRN:  [id number]       Chief complaint: Metabolic encephalopathy parkinsonism vascular dementia    History of present illness:  He is slightly more alert than yesterday follows a couple commands tracks me well and smiles and his wife thinks he might be a bit better as well, see below.  The MRI scan and by my independent eyeball review showed chronic changes only.  Indeed he does appear to be more alert today slightly      Past medical history:  Past Medical History:   Diagnosis Date   • Abnormal TSH 2008   • Alzheimer's dementia    • Anorexia 2008   • Arthritis    • Arthritis of shoulder 2014   • Arthropathy of pelvic region and thigh    • BPH (benign prostatic hyperplasia)    • Bursitis    • Cholelithiasis 2008   • Chronic constipation    • Colonic polyp    • Communicating hydrocephalus    • Constipation    • Contusion of thigh, left 2014   • COPD (chronic obstructive pulmonary disease)    • DDD (degenerative disc disease), cervical    • Dementia     2012   • Depression 2012   • Diverticulosis    • Eczema    • Glossitis 2009   • Hypertension 2007   • Hypothyroidism 2011   • Ingrown nail 2007   • Lumbar stenosis    • Lumbar stenosis    • Pneumonia    • Right shoulder tendonitis    • Shortness of breath        Allergies:  Factive [gemifloxacin]; Fluticasone-salmeterol; and Cefdinir    Home medications:  Prescriptions Prior to Admission   Medication Sig Dispense Refill Last Dose   • amLODIPine (NORVASC) 2.5 MG tablet Take 1 tablet by mouth Daily. 30 tablet 3    • amoxicillin-clavulanate (AUGMENTIN) 875-125 MG per tablet Take 1 tablet by mouth Every 12 (Twelve) Hours for 12 doses. Indications: Pneumonia 12 tablet 0    • aspirin 81 MG chewable tablet Chew 81 mg daily.   Taking   • cholecalciferol (VITAMIN D3) 1000 UNITS tablet Take 2,000 Units by mouth 2  (Two) Times a Day.   Taking   • donepezil (ARICEPT) 23 MG tablet Take 23 mg by mouth Every Night.   Taking   • isosorbide mononitrate (IMDUR) 30 MG 24 hr tablet Take 1 tablet by mouth Daily. 30 tablet 3    • levothyroxine (SYNTHROID, LEVOTHROID) 50 MCG tablet Take 1 tablet by mouth Daily for 180 days. 90 tablet 1 Taking   • memantine (NAMENDA XR) 28 MG capsule sustained-release 24 hr extended release capsule Take 28 mg by mouth Daily.      • mirtazapine (REMERON) 30 MG tablet Take 30 mg by mouth Daily.   Taking   • polyethylene glycol (GLYCOLAX) powder Take 17 g by mouth As Needed.   Taking   • pramipexole (MIRAPEX) 0.125 MG tablet Take 0.125 mg by mouth 3 (Three) Times a Day.      • risperiDONE (risperDAL) 1 MG tablet Take 1 mg by mouth Every Night.   Taking   • tamsulosin (FLOMAX) 0.4 MG capsule 24 hr capsule Take 1 capsule by mouth Every Night for 180 days. 90 capsule 1 Taking   • Umeclidinium-Vilanterol (ANORO ELLIPTA) 62.5-25 MCG/INH aerosol powder  Inhale 1 puff Daily.   Taking        Hospital medications:    amLODIPine 2.5 mg Oral Daily   aspirin 81 mg Oral Daily   cholecalciferol 2,000 Units Oral Daily   donepezil 20 mg Oral Nightly   enoxaparin 40 mg Subcutaneous Nightly   isosorbide mononitrate 30 mg Oral Daily   levothyroxine 50 mcg Oral Q AM   memantine 10 mg Oral Q12H   piperacillin-tazobactam 3.375 g Intravenous Q8H   tamsulosin 0.4 mg Oral Daily       Pharmacy to Dose Zosyn     sodium chloride 0.9 % with KCl 20 mEq 100 mL/hr Last Rate: 100 mL/hr (01/06/18 0647)     •  acetaminophen  •  nitroglycerin  •  ondansetron  •  Pharmacy to Dose Zosyn  •  polyethylene glycol  •  sodium chloride  •  sodium chloride  •  Insert peripheral IV **AND** sodium chloride  •  sodium chloride      Objective:  Vitals Ranges:   Temp:  [98.2 °F (36.8 °C)-100 °F (37.8 °C)] 98.3 °F (36.8 °C)  Heart Rate:  [59-74] 74  Resp:  [16-18] 18  BP: ()/(64-88) 98/64      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    Data review:  Lab Results (last 24 hours)     Procedure Component Value Units Date/Time    CBC (No Diff) [[id number]]  (Abnormal) Collected:  01/06/18 0343    Specimen:  Blood Updated:  01/06/18 0514     WBC 12.11 (H) 10*3/mm3      RBC 4.31 (L) 10*6/mm3      Hemoglobin 12.1 (L) g/dL      Hematocrit 37.2 (L) %      MCV 86.3 fL      MCH 28.1 pg      MCHC 32.5 (L) g/dL      RDW 14.0 %      RDW-SD 43.8 fl      MPV 9.6 fL      Platelets 326 10*3/mm3     Basic Metabolic Panel [[id number]]  (Abnormal) Collected:  01/06/18 0343    Specimen:  Blood Updated:  01/06/18 0546     Glucose 68 mg/dL      BUN 22 mg/dL      Creatinine 0.99 mg/dL      Sodium 138 mmol/L      Potassium 3.8 mmol/L      Chloride 103 mmol/L      CO2 19.6 (L) mmol/L      Calcium 8.3 (L) mg/dL      eGFR Non African Amer 73 mL/min/1.73      BUN/Creatinine Ratio 22.2     Anion Gap 15.4 mmol/L     Narrative:       The MDRD GFR formula is only valid for adults with stable renal function between ages 18 and 70.           Imaging:  Imaging Results (last 24 hours)     ** No results found for the last 24 hours. **             Assessment and Plan:     Principal Problem:    Acute metabolic encephalopathy  Active Problems:    Alzheimer's type dementia    Central spinal stenosis    Essential hypertension    Hypothyroidism    Communicating hydrocephalus    Parkinson's disease    Generalized weakness    Pneumonia    Altered mental status    Metabolic encephalopathy    Toxic encephalopathy    This metabolic encephalopathy appears to be improved although he is still a little drowsy he does have parkinsonism and vascular dementia.  The pneumonia is being treated appropriately the MRI by my independent eyeball  review shows chronic changes only I will sign off in follow-up.  Reconsult thank you      Abhishek Cotto MD  01/06/18  2:31 PM

## 2018-01-06 NOTE — PROGRESS NOTES
"     LOS: 3 days   Primary Care Physician: Buster Howe MD     Subjective   Was awake and more alert this morning.  Ate all of his lunch.  He's sleeping now.  Daughter at bedside    Vital Signs  Body mass index is 20.05 kg/(m^2).  Temp:  [98.2 °F (36.8 °C)-100 °F (37.8 °C)] 98.3 °F (36.8 °C)  Heart Rate:  [59-74] 74  Resp:  [16-18] 18  BP: ()/(64-88) 98/64      Objective:  General Appearance:  In no acute distress.    Vital signs: (most recent): Blood pressure 98/64, pulse 74, temperature 98.3 °F (36.8 °C), temperature source Axillary, resp. rate 18, height 190.5 cm (75\"), weight 72.8 kg (160 lb 6.4 oz), SpO2 96 %.    HEENT: (Dry mucosa.  No JVD.  Trachea midline.  No lymphadenopathy.)    Lungs:  There are rales and decreased breath sounds.  No wheezes or rhonchi.  (Few crackles at bases)  Heart: Bradycardia.  Regular rhythm.  No murmur.   Abdomen: Abdomen is soft and non-distended.  Bowel sounds are normal.   There is no abdominal tenderness.   There is no splenomegaly. There is no hepatomegaly.   Extremities: There is no dependent edema.    Neurological: (Very sleepy.  Does awaken briefly and mumbles responses, then falls back asleep).          Results Review:    I reviewed the patient's new clinical results.      Results from last 7 days  Lab Units 01/06/18  0343 01/05/18  0336   WBC 10*3/mm3 12.11* 11.64*   HEMOGLOBIN g/dL 12.1* 12.1*   PLATELETS 10*3/mm3 326 267       Results from last 7 days  Lab Units 01/06/18  0343 01/05/18  0336   SODIUM mmol/L 138 137   POTASSIUM mmol/L 3.8 3.7   CHLORIDE mmol/L 103 99   CO2 mmol/L 19.6* 23.3   BUN mg/dL 22 20   CREATININE mg/dL 0.99 1.05   CALCIUM mg/dL 8.3* 8.5*   GLUCOSE mg/dL 68 71       Results from last 7 days  Lab Units 01/03/18  1932   INR  1.22*     Hemoglobin A1C:No results found for: HGBA1C    Glucose Range:  Glucose   Date/Time Value Ref Range Status   01/04/2018 1059 81 70 - 130 mg/dL Final       Lab Results   Component Value Date    RMAYAVXB39 434 " 04/20/2017       Lab Results   Component Value Date    TSH 6.080 (H) 07/17/2017       Assessment & Plan      Medication Review: Yes    Active Hospital Problems (** Indicates Principal Problem)    Diagnosis Date Noted   • **Acute metabolic encephalopathy [G93.41] 01/01/2018   • Metabolic encephalopathy [G93.41] 01/05/2018   • Toxic encephalopathy [G92] 01/05/2018   • Altered mental status [R41.82] 01/03/2018   • Pneumonia [J18.9] 12/31/2017   • Generalized weakness [R53.1] 12/30/2017   • Parkinson's disease [G20] 09/13/2016   • Communicating hydrocephalus [G91.0] 07/12/2016   • Essential hypertension [I10] 01/11/2016   • Hypothyroidism [E03.9] 01/11/2016   • Central spinal stenosis [M48.00] 12/10/2015   • Alzheimer's type dementia [G30.9] 11/18/2015      Resolved Hospital Problems    Diagnosis Date Noted Date Resolved   No resolved problems to display.       Assessment/Plan  1.  Metabolic and toxic encephalopathies, multifactorial.  Risperdal and Mirapex were discontinued.  Continue antibiotic for pneumonia diagnosed previous admission.  Speech therapy has advance diet.  Continue to increase activity as tolerated.  Stop IV fluids in the morning  2.  Parkinson's disease and dementia, vascular versus Alzheimer's.  He is on Namenda and Aricept, no Sinemet.  Mirapex discontinued as above  3.  Weakness.  CCP reports patient's wife refuses nursing home placement.  This is the second admission in 10 days.    Angelic Croft MD  01/06/18  3:29 PM

## 2018-01-06 NOTE — PLAN OF CARE
Problem: Patient Care Overview (Adult)  Goal: Plan of Care Review  Outcome: Ongoing (interventions implemented as appropriate)   01/06/18 1630   Coping/Psychosocial Response Interventions   Plan Of Care Reviewed With patient   Patient Care Overview   Progress improving   Outcome Evaluation   Outcome Summary/Follow up Plan pt had speech eval today; puree with thins; received new bed with accumax pump working; continues abt; ledezma for acute retention; confusion but alert; full code       Problem: Confusion, Acute (Adult)  Goal: Safety  Outcome: Ongoing (interventions implemented as appropriate)      Problem: Fall Risk (Adult)  Goal: Absence of Falls  Outcome: Ongoing (interventions implemented as appropriate)      Problem: Pressure Ulcer Risk (Jesse Scale) (Adult,Obstetrics,Pediatric)  Goal: Skin Integrity  Outcome: Ongoing (interventions implemented as appropriate)

## 2018-01-06 NOTE — PLAN OF CARE
Problem: Patient Care Overview (Adult)  Goal: Plan of Care Review  Outcome: Ongoing (interventions implemented as appropriate)   01/06/18 0441   Coping/Psychosocial Response Interventions   Plan Of Care Reviewed With patient   Patient Care Overview   Progress improving   Outcome Evaluation   Outcome Summary/Follow up Plan pt. alert and answered a few questions. VSS, speech to see in the AM. pt sleeping most of the evening. will continue to monitor.      Goal: Adult Individualization and Mutuality  Outcome: Ongoing (interventions implemented as appropriate)    Goal: Discharge Needs Assessment  Outcome: Ongoing (interventions implemented as appropriate)      Problem: Confusion, Acute (Adult)  Goal: Identify Related Risk Factors and Signs and Symptoms  Outcome: Outcome(s) achieved Date Met: 01/06/18    Goal: Cognitive/Functional Impairments Minimized  Outcome: Ongoing (interventions implemented as appropriate)    Goal: Safety  Outcome: Ongoing (interventions implemented as appropriate)      Problem: Skin Integrity Impairment, Risk/Actual (Adult)  Goal: Identify Related Risk Factors and Signs and Symptoms  Outcome: Ongoing (interventions implemented as appropriate)    Goal: Skin Integrity/Wound Healing  Outcome: Ongoing (interventions implemented as appropriate)      Problem: Fall Risk (Adult)  Goal: Identify Related Risk Factors and Signs and Symptoms  Outcome: Outcome(s) achieved Date Met: 01/06/18    Goal: Absence of Falls  Outcome: Ongoing (interventions implemented as appropriate)      Problem: Pressure Ulcer Risk (Jesse Scale) (Adult,Obstetrics,Pediatric)  Goal: Identify Related Risk Factors and Signs and Symptoms  Outcome: Outcome(s) achieved Date Met: 01/06/18    Goal: Skin Integrity  Outcome: Ongoing (interventions implemented as appropriate)

## 2018-01-06 NOTE — THERAPY EVALUATION
Acute Care - Speech Language Pathology   Swallow Initial Evaluation [facility]     Patient Name: Alexandro Hallman  : 1940  MRN: [id number]  Today's Date: 2018  Onset of Illness/Injury or Date of Surgery Date: 18            Admit Date: 1/3/2018    Visit Dx:     ICD-10-CM ICD-9-CM   1. Altered mental status, unspecified altered mental status type R41.82 780.97   2. Acute metabolic encephalopathy G93.41 348.31   3. Left pulmonary infiltrate on CXR R91.8 793.19     Patient Active Problem List   Diagnosis   • Alzheimer's type dementia   • Central spinal stenosis   • Essential hypertension   • Hypothyroidism   • Benign prostatic hypertrophy (BPH) with incomplete bladder emptying   • Communicating hydrocephalus   • Parkinson's disease   • Gait instability   • Weakness   • Hallucinations, unspecified   • Generalized weakness   • Pneumonia   • Acute metabolic encephalopathy   • Altered mental status   • Metabolic encephalopathy   • Toxic encephalopathy     SPEECH-LANGUAGE PATHOLOGY EVALUATION - SWALLOW  Subjective: The patient was seen on this date for a Clinical Swallow evaluation.  Patient was poorly arousable. Required cues to stay awake.  Significant history: Parkinson's disease, alzheimer's dis.  Previous VFSS 18 during last hospitalization recommended thin liquids and mech soft diet.   Objective: Textures given included ice, thin liquid and puree consistency.  Mech soft not attempted d/t alertness level.  Assessment: Difficulties were noted with none of the above consistencies.  Pt required multiple cues to maintain alertness.  Pt opened eyes only briefly but did respond verbally to some questions.  Pt became more alert when ice chip rolled on his lips.  Pt did take 3 oz puree via tsp with good lip closure around spoon.  Pt took 4 oz ice water via straw with timely swallow and good laryngeal elevation.  Pt with sporadic cough towards end of trials.  RN alerted that pt more awake and therefore  RN and SLP were able to give pt all his meds required via puree.     SLP Findings:  Patient presents with mild oropharyngeal dysphagia, without esophageal component.   Recommendations: Diet Textures: thin liquid, puree consistency food.  Medications should be taken whole with puree. May have water and ice between meals after oral care, under staff or family supervision and with the recommended strategies for safe swallowing.    Recommended Strategies: Pt must be alert for all po, may  Not open eyes but needs to take trials from spoon, do not dump in mouth.   Upright for PO, small bites and sips, may use straw, alternate liquids and solids and supervision with all PO. Oral care before breakfast, after all meals and PRN.  Other Recommended Evaluations: none  Dysphagia therapy is recommended. Rationale: oropharyngeal dysphagia      Past Medical History:   Diagnosis Date   • Abnormal TSH 12/2008   • Alzheimer's dementia    • Anorexia 02/28/2008   • Arthritis    • Arthritis of shoulder 04/01/2014   • Arthropathy of pelvic region and thigh    • BPH (benign prostatic hyperplasia)    • Bursitis    • Cholelithiasis 03/27/2008   • Chronic constipation    • Colonic polyp    • Communicating hydrocephalus    • Constipation    • Contusion of thigh, left 02/19/2014   • COPD (chronic obstructive pulmonary disease)    • DDD (degenerative disc disease), cervical    • Dementia     08/14/2012   • Depression 05/02/2012   • Diverticulosis    • Eczema    • Glossitis 09/17/2009   • Hypertension 12/03/2007   • Hypothyroidism 03/14/2011   • Ingrown nail 12/2007   • Lumbar stenosis    • Lumbar stenosis    • Pneumonia    • Right shoulder tendonitis 02/29/2014   • Shortness of breath      Past Surgical History:   Procedure Laterality Date   • BACK SURGERY     • CATARACT EXTRACTION, BILATERAL     • COLONOSCOPY  11/04/2011   • FOOT SURGERY Left 1970    spur repair   • FOOT SURGERY Right 1993    spur   • HERNIA REPAIR  1969    double hernia repair    • KNEE SURGERY  1967    Left Knee   • SPINE SURGERY  1997    disc repair          SWALLOW EVALUATION (last 72 hours)      Swallow Evaluation       01/06/18 1100 01/05/18 1300             Rehab Evaluation    Document Type evaluation  -MG evaluation  -AW       Subjective Information no complaints;agree to therapy;fatigue  -MG no complaints;agree to therapy  -AW       Patient Effort, Rehab Treatment adequate  -MG adequate  -AW       Symptoms Noted During/After Treatment fatigue  -MG fatigue  -AW       General Information    Patient Profile Review yes  -MG yes  -AW       Current Diet Limitations NPO  -MG NPO  -AW       Precautions/Limitations, Hearing WFL  -MG        Prior Level of Function- Communication functional in a familiar environment/with familiar caregiver  -MG        Prior Level of Function- Swallowing diet modifications- foods   mech soft  -MG diet modifications- foods;other (comment)   mech soft/thin, VFSS 1/2/18  -AW       Plans/Goals Discussed With patient;spouse/S.O.;agreed upon  -MG patient;spouse/S.O.  -AW       Barriers to Rehab none identified  -MG        Clinical Impression    Patient's Goals For Discharge patient did not state  -MG patient did not state  -AW       Family Goals For Discharge patient able to return to PO diet  -MG patient able to return to PO diet  -AW       SLP Swallowing Diagnosis pharyngeal dysfunction  -MG pharyngeal dysfunction  -AW       Rehab Potential/Prognosis, Swallowing  good, to achieve stated therapy goals  -AW       Criteria for Skilled Therapeutic Interventions Met skilled criteria for dysphagia intervention met  -MG skilled criteria for dysphagia intervention met  -AW       FCM, Swallowing 4-->Level 4  -MG 1-->Level 1  -AW       Therapy Frequency evaluation only  -MG PRN  -AW       Predicted Duration Therapy Interv (days)  until discharge  -AW       Expected Duration Therapy Session (min)  15-30 minutes  -AW       SLP Diet Recommendation II - pureed;thin liquids  -MG  NPO: unsafe for food/liquid intake  -AW       Recommended Diagnostics  reassess via clinical swallow (non-instrumental exam)  -AW       Recommended Feeding/Eating Techniques alternate between small bites and sips of food/liquid;check mouth frequently for oral residue/pocketing;maintain upright posture during/after eating for 30 mins  -MG maintain upright posture during/after eating for 30 mins;small sips/bites  -AW       SLP Rec. for Method of Medication Administration meds whole in pudding/applesauce  -MG meds via alternate route  -AW       Monitor For Signs Of Aspiration cough;pneumonia  -MG right lower lobe infiltrates;pneumonia;upper respiratory infection;fever;throat clearing;gurgly voice;elevated WBC count;cough  -AW       Anticipated Discharge Disposition inpatient rehabilitation facility  -MG placement for care  -AW       Pain Assessment    Pain Assessment No/denies pain  -MG        Cognitive Assessment/Intervention    Current Cognitive/Communication Assessment impaired  -MG impaired  -AW       Orientation Status  oriented to;person  -AW       Follows Commands/Answers Questions 75% of the time  -MG 25% of the time;able to follow single-step instructions;needs increased time;needs repetition;speech unintelligible  -AW       Personal Safety  decreased awareness, need for safety;decreased awareness, need for assist  -AW       Personal Safety Interventions  fall prevention program maintained  -AW       Oral Motor Structure and Function    Oral Motor Anatomy and Physiology patient demonstrates anatomy that is WNL  -MG patient demonstrates anatomy that is WNL  -AW       Dentition Assessment present and adequate  -MG missing teeth;other (see comments)   has upper dentures at home, but does not wear  -AW       Secretion Management WNL/WFL  -MG        Volitional Swallow significant delay initiating volitional swallow  -MG unable to initiate volitional swallow  -AW       Oral Musculature General Assessment  other (see  comments)   unable to follow directions to assess  -AW       General Feeding/Swallowing Observations    Current Feeding Method NPO  -MG          User Key  (r) = Recorded By, (t) = Taken By, (c) = Cosigned By    Initials Name Effective Dates    MG Dayan Larsen MA,CCC-SLP 04/13/15 -     AW Sonal Shah MS CCC-SLP 04/13/15 -         EDUCATION  The patient has been educated in the following areas:   Dysphagia (Swallowing Impairment) Oral Care/Hydration Modified Diet Instruction.    SLP Recommendation and Plan  SLP Swallowing Diagnosis: pharyngeal dysfunction  SLP Diet Recommendation: II - pureed, thin liquids  Recommended Feeding/Eating Techniques: alternate between small bites and sips of food/liquid, check mouth frequently for oral residue/pocketing, maintain upright posture during/after eating for 30 mins  SLP Rec. for Method of Medication Administration: meds whole in pudding/applesauce  Monitor For Signs Of Aspiration: cough, pneumonia     Criteria for Skilled Therapeutic Interventions Met: skilled criteria for dysphagia intervention met  Anticipated Discharge Disposition: inpatient rehabilitation facility     Therapy Frequency: evaluation only                        IP SLP Goals       01/06/18 1155 01/05/18 1325       Safely Consume Diet    Safely Consume Diet- SLP, Date Established  01/05/18  -AW     Safely Consume Diet- SLP, Time to Achieve by discharge  -MG by discharge  -AW       User Key  (r) = Recorded By, (t) = Taken By, (c) = Cosigned By    Initials Name Provider Type    MG Dayan Larsen MA,CCC-SLP Speech and Language Pathologist    PRATIK Shah, MS CCC-SLP Speech and Language Pathologist             SLP Outcome Measures (last 72 hours)      SLP Outcome Measures       01/06/18 1100 01/05/18 1300       SLP Outcome Measures    Outcome Measure Used? Adult NOMS  -MG Adult NOMS  -AW     FCM Scores    FCM Chosen Swallowing  -MG Swallowing  -AW     Swallowing FCM Score 4  -MG 1  -AW       User Key  (r) =  Recorded By, (t) = Taken By, (c) = Cosigned By    Initials Name Effective Dates    MG Dayan Larsen MA,CCC-SLP 04/13/15 -     AW Sonal Shah, MS CCC-SLP 04/13/15 -            Time Calculation:         Time Calculation- SLP       01/06/18 1159          Time Calculation- SLP    SLP Received On 01/06/18  -MG        User Key  (r) = Recorded By, (t) = Taken By, (c) = Cosigned By    Initials Name Provider Type    MG Dayan Larsen MA,CCC-SLP Speech and Language Pathologist          Therapy Charges for Today     Code Description Service Date Service Provider Modifiers Qty    [id number] HC ST EVAL ORAL PHARYNG SWALLOW 4 1/6/2018 Dayan Larsen MA,CCC-SLP GN 1               Dayan Larsen MA,CCC-SLP  1/6/2018

## 2018-01-06 NOTE — PLAN OF CARE
Problem: Inpatient SLP  Goal: Dysphagia- Patient will safely consume diet as per recommendation with no signs/symptoms of aspiration  Outcome: Ongoing (interventions implemented as appropriate)   01/06/18 1155   Safely Consume Diet   Safely Consume Diet- SLP, Time to Achieve by discharge

## 2018-01-07 NOTE — PLAN OF CARE
Problem: Patient Care Overview (Adult)  Goal: Plan of Care Review  Outcome: Ongoing (interventions implemented as appropriate)   01/07/18 0458   Coping/Psychosocial Response Interventions   Plan Of Care Reviewed With patient   Patient Care Overview   Progress improving   Outcome Evaluation   Outcome Summary/Follow up Plan VSS, pt still confused and oriented to self only. pt taking pills whole in applesauce. abx continued. turns Q2, resting well throughout the evening. will continue to monitor.      Goal: Adult Individualization and Mutuality  Outcome: Ongoing (interventions implemented as appropriate)    Goal: Discharge Needs Assessment  Outcome: Ongoing (interventions implemented as appropriate)      Problem: Confusion, Acute (Adult)  Goal: Cognitive/Functional Impairments Minimized  Outcome: Ongoing (interventions implemented as appropriate)    Goal: Safety  Outcome: Ongoing (interventions implemented as appropriate)      Problem: Skin Integrity Impairment, Risk/Actual (Adult)  Goal: Identify Related Risk Factors and Signs and Symptoms  Outcome: Ongoing (interventions implemented as appropriate)    Goal: Skin Integrity/Wound Healing  Outcome: Ongoing (interventions implemented as appropriate)      Problem: Fall Risk (Adult)  Goal: Absence of Falls  Outcome: Ongoing (interventions implemented as appropriate)      Problem: Pressure Ulcer Risk (Jesse Scale) (Adult,Obstetrics,Pediatric)  Goal: Skin Integrity  Outcome: Ongoing (interventions implemented as appropriate)

## 2018-01-07 NOTE — PLAN OF CARE
Problem: Patient Care Overview (Adult)  Goal: Plan of Care Review  Outcome: Ongoing (interventions implemented as appropriate)   01/07/18 5926   Coping/Psychosocial Response Interventions   Plan Of Care Reviewed With patient   Patient Care Overview   Progress improving   Outcome Evaluation   Outcome Summary/Follow up Plan pt more alert today (alert x2); discontinued fluids; started senna and miralax; continues iv abt; turn q2; ledezma; full code

## 2018-01-07 NOTE — THERAPY TREATMENT NOTE
Acute Care - Physical Therapy Treatment Note  [facility]     Patient Name: Alexandro Hallman  : 1940  MRN: [id number]  Today's Date: 2018  Onset of Illness/Injury or Date of Surgery Date: 18  Date of Referral to PT: 18  Referring Physician: Iona Toro    Admit Date: 1/3/2018    Visit Dx:    ICD-10-CM ICD-9-CM   1. Altered mental status, unspecified altered mental status type R41.82 780.97   2. Acute metabolic encephalopathy G93.41 348.31   3. Left pulmonary infiltrate on CXR R91.8 793.19     Patient Active Problem List   Diagnosis   • Alzheimer's type dementia   • Central spinal stenosis   • Essential hypertension   • Hypothyroidism   • Benign prostatic hypertrophy (BPH) with incomplete bladder emptying   • Communicating hydrocephalus   • Parkinson's disease   • Gait instability   • Weakness   • Hallucinations, unspecified   • Generalized weakness   • Pneumonia   • Acute metabolic encephalopathy   • Altered mental status   • Metabolic encephalopathy   • Toxic encephalopathy               Adult Rehabilitation Note       18 1115 18 1117       Rehab Assessment/Intervention    Discipline physical therapist  -SV physical therapist  -MS     Document Type therapy note (daily note)  -SV      Subjective Information agree to therapy;no complaints  -SV      Patient Effort, Rehab Treatment adequate  -SV      Symptoms Noted During/After Treatment fatigue  -SV      Symptoms Noted Comment pt sat on eob for 4-5 minutes: less responsive, leaning post  -SV      Precautions/Limitations fall precautions;other (see comments)   cognitive issues  -SV      Specific Treatment Considerations Parkinsons: becoming stiff at end rom, rigidity  -SV      Recorded by [SV] Dora Cruz, PT [MS] Jarvis Thomas, PT     Pain Assessment    Pain Assessment No/denies pain  -SV      Recorded by [SV] Dora Cruz, PT      Cognitive Assessment/Intervention    Current Cognitive/Communication Assessment  impaired  -SV      Follows Commands/Answers Questions 25% of the time;able to follow single-step instructions;needs repetition;needs increased time;needs cueing  -SV      Recorded by [SV] Dora Cruz, ALDAIR      Bed Mobility, Assessment/Treatment    Bed Mobility, Scoot/Bridge, Rake dependent (less than 25% patient effort);2 person assist required  -SV      Bed Mob, Supine to Sit, Rake maximum assist (25% patient effort)  -SV      Bed Mob, Sit to Supine, Rake dependent (less than 25% patient effort)  -SV      Bed Mobility, Comment sat eob with post lean, 4-5 minutes, cga, min or mod at times  -SV      Recorded by [SV] Dora Cruz, ALDAIR      Transfer Assessment/Treatment    Transfers, Sit-Stand Rake unable to perform;not appropriate to assess  -SV      Recorded by [SV] Dora Cruz, ALDAIR      Therapy Exercises    Bilateral Lower Extremities PROM:   attempted ex on eob: unable, prom yecenia HS stretch, PF stretch  -SV      Bilateral Upper Extremity AAROM:;5 reps;shoulder extension/flexion;shoulder horizontal abd/add  -SV      Recorded by [SV] Dora Cruz PT      Positioning and Restraints    Pre-Treatment Position in bed  -SV      Post Treatment Position bed  -SV      In Bed notified nsg;call light within reach;encouraged to call for assist;exit alarm on;with family/caregiver  -SV      Recorded by [SV] Dora Cruz, ALDAIR        User Key  (r) = Recorded By, (t) = Taken By, (c) = Cosigned By    Initials Name Effective Dates    MS Jarvis WHITNEY Martha, PT 12/01/15 -     SV Dora Cruz, PT 01/17/16 -                 IP PT Goals       01/05/18 1123          Bed Mobility PT LTG    Bed Mobility PT LTG, Date Established 01/05/18  -MS      Bed Mobility PT LTG, Time to Achieve 2 wks  -MS      Bed Mobility PT LTG, Activity Type all bed mobility  -MS      Bed Mobility PT LTG, Rake Level minimum assist (75% patient effort);2 person assist required  -MS      Transfer Training PT LTG     Transfer Training PT LTG, Date Established 01/05/18  -MS      Transfer Training PT LTG, Time to Achieve 2 wks  -MS      Transfer Training PT LTG, Activity Type sit to stand/stand to sit  -MS      Transfer Training PT LTG, [place] Level minimum assist (75% patient effort);2 person assist required  -MS      Transfer Training PT LTG, Assist Device walker, rolling  -MS      Transfer Training 2 PT LTG    Transfer Training PT 2 LTG, Date Established 01/05/18  -MS      Transfer Training PT 2 LTG, Time to Achieve 2 wks  -MS      Transfer Training PT 2 LTG, Activity Type bed to chair /chair to bed  -MS      Transfer Training PT 2 LTG, [place] Level minimum assist (75% patient effort);2 person assist required  -MS      Transfer Training PT 2 LTG, Assist Device walker, rolling  -MS      Gait Training PT LTG    Gait Training Goal PT LTG, Date Established 01/05/18  -MS      Gait Training Goal PT LTG, Time to Achieve 2 wks  -MS      Gait Training Goal PT LTG, [place] Level minimum assist (75% patient effort);2 person assist required  -MS      Gait Training Goal PT LTG, Assist Device walker, rolling  -MS      Gait Training Goal PT LTG, Distance to Achieve 30 feet  -MS        User Key  (r) = Recorded By, (t) = Taken By, (c) = Cosigned By    Initials Name Provider Type    MS Jarvis Thomas, PT Physical Therapist          Physical Therapy Education     Title: PT OT SLP Therapies (Active)     Topic: Physical Therapy (Active)     Point: Mobility training (Done)    Learning Progress Summary    Learner Readiness Method Response Comment Documented by Status   Patient Acceptance E,TB,D NR,VU  SV 01/07/18 1129 Done    Nonacceptance E,D NR  MS 01/05/18 1123 Active   Family Acceptance E,TB,D NR,VU  SV 01/07/18 1129 Done               Point: Home exercise program (Done)    Learning Progress Summary    Learner Readiness Method Response Comment Documented by Status   Patient Acceptance E,TB,D NR,VU   01/07/18 1129 Done     Nonacceptance E,D NR  MS 01/05/18 1123 Active   Family Acceptance E,TB,D NR,VU  SV 01/07/18 1129 Done               Point: Body mechanics (Active)    Learning Progress Summary    Learner Readiness Method Response Comment Documented by Status   Patient Nonacceptance E,D NR  MS 01/05/18 1123 Active               Point: Precautions (Active)    Learning Progress Summary    Learner Readiness Method Response Comment Documented by Status   Patient Nonacceptance E,D NR  MS 01/05/18 1123 Active                      User Key     Initials Effective Dates Name Provider Type Discipline    MS 12/01/15 -  Jarvis Thomas, PT Physical Therapist PT     01/17/16 -  Dora Cruz, PT Physical Therapist PT                    PT Recommendation and Plan  Anticipated Discharge Disposition: skilled nursing facility  Planned Therapy Interventions: balance training, bed mobility training, gait training, home exercise program, patient/family education, postural re-education, ROM (Range of Motion), strengthening, transfer training  PT Frequency: daily  Plan of Care Review  Plan Of Care Reviewed With: patient, spouse  Outcome Summary/Follow up Plan: Pt confused and following directions minimally. Pt with posterior lean in sitting today. Pt unable to attempt standing . Pt fatigued in sitting.          Outcome Measures       01/07/18 1100 01/05/18 1100       How much help from another person do you currently need...    Turning from your back to your side while in flat bed without using bedrails? 1  -SV 2  -MS     Moving from lying on back to sitting on the side of a flat bed without bedrails? 1  -SV 2  -MS     Moving to and from a bed to a chair (including a wheelchair)? 1  -SV 1  -MS     Standing up from a chair using your arms (e.g., wheelchair, bedside chair)? 1  -SV 1  -MS     Climbing 3-5 steps with a railing? 1  -SV 1  -MS     To walk in hospital room? 1  -SV 1  -MS     AM-PAC 6 Clicks Score 6  -SV 8  -MS     Functional Assessment     Outcome Measure Options AM-PAC 6 Clicks Basic Mobility (PT)  -SV AM-PAC 6 Clicks Basic Mobility (PT)  -MS       User Key  (r) = Recorded By, (t) = Taken By, (c) = Cosigned By    Initials Name Provider Type    MS Jarvis WHITNEY Martha, PT Physical Therapist    SV Dora Cruz, PT Physical Therapist           Time Calculation:         PT Charges       01/07/18 1131          Time Calculation    Start Time 1115  -SV      Stop Time 1131  -SV      Time Calculation (min) 16 min  -SV      PT Received On 01/07/18  -SV      PT - Next Appointment 01/08/18  -SV        User Key  (r) = Recorded By, (t) = Taken By, (c) = Cosigned By    Initials Name Provider Type    SV Dora Cruz, PT Physical Therapist          Therapy Charges for Today     Code Description Service Date Service Provider Modifiers Qty    39120530543 HC PT THER SUPP EA 15 MIN 1/7/2018 Dora Cruz, PT GP 1    01195430411 HC PT THERAPEUTIC ACT EA 15 MIN 1/7/2018 Dora Cruz, PT GP 1          PT G-Codes  Outcome Measure Options: AM-PAC 6 Clicks Basic Mobility (PT)    Dora Cruz, PT  1/7/2018

## 2018-01-07 NOTE — PROGRESS NOTES
"   LOS: 4 days   Patient Care Team:  Buster Howe MD as PCP - General (Family Medicine)  Buster Howe MD as PCP - Family Medicine  Spenser Borrero MD as Consulting Physician (Cardiology)  Marielle Braxton MD (Inactive) as Consulting Physician (Neurology)    Chief Complaint: tired    Subjective     HPI Comments: Pt feeling better today. Still tired. No BM. Wife agrees he's more awake today.    Altered Mental Status   Associated symptoms include weakness. Pertinent negatives include no anorexia, chest pain, coughing, fever, nausea or vomiting.       Subjective:  Symptoms:  Stable.  He reports weakness.  No shortness of breath, malaise, cough, chest pain, headache, chest pressure, anorexia, diarrhea or anxiety.    Diet:  Poor intake.  No nausea or vomiting.    Activity level: Impaired due to weakness.    Pain:  He reports no pain.        History taken from: patient chart family    Objective     Vital Signs  Temp:  [98.2 °F (36.8 °C)-99.1 °F (37.3 °C)] 98.6 °F (37 °C)  Heart Rate:  [55-74] 56  Resp:  [16-18] 16  BP: ()/(64-80) 125/78    Objective:  General Appearance:  Comfortable and in no acute distress.    Vital signs: (most recent): Blood pressure 125/78, pulse 56, temperature 98.6 °F (37 °C), temperature source Temporal Artery , resp. rate 16, height 190.5 cm (75\"), weight 72.8 kg (160 lb 6.4 oz), SpO2 96 %.  Vital signs are normal.  No fever.    Output: Producing urine and no stool output.    HEENT: Normal HEENT exam.    Lungs:  Normal respiratory rate and normal effort.  Breath sounds clear to auscultation.    Heart: Normal rate.  Regular rhythm.    Abdomen: Abdomen is soft.  Bowel sounds are normal.   There is no abdominal tenderness.     Extremities: There is no dependent edema.    Pulses: Distal pulses are intact.    Neurological: Patient is alert.    Pupils:  Pupils are equal, round, and reactive to light.    Skin:  Warm and dry.              Results Review:     I reviewed the patient's new " clinical results.  I reviewed the patient's other test results and agree with the interpretation  Discussed with patient and wife    Medication Review: reviewed    Assessment/Plan     Principal Problem:    Acute metabolic encephalopathy  Active Problems:    Alzheimer's type dementia    Central spinal stenosis    Essential hypertension    Hypothyroidism    Communicating hydrocephalus    Parkinson's disease    Generalized weakness    Pneumonia    Altered mental status    Metabolic encephalopathy    Toxic encephalopathy          Plan:   (Continue Zosyn  Continue PT  Mirapex and Risperdal dc'd and seems to be doing a little better now  Continue Flomax and ledezma for now, voiding trial when mobility improved  DC IVFs  ST bui noted, po precautions in place  Bowel regimen  Wife considering SNF at dc, I think this would be best for all involved.).       Abhishek Cade MD  01/07/18  11:52 AM    Time: More than 50% of time spent in counseling and coordination of care:  Total face-to-face/floor time 40 min.  Time spent in counseling 25 min. Counseling included the following topics: mobility, mental status, care at home, benefits of PT, change in meds, nature of dementia

## 2018-01-07 NOTE — PLAN OF CARE
Problem: Patient Care Overview (Adult)  Goal: Plan of Care Review  Outcome: Ongoing (interventions implemented as appropriate)   01/07/18 1130   Coping/Psychosocial Response Interventions   Plan Of Care Reviewed With patient;spouse   Outcome Evaluation   Outcome Summary/Follow up Plan Pt confused and following directions minimally. Pt with posterior lean in sitting today. Pt unable to attempt standing . Pt fatigued in sitting.

## 2018-01-08 NOTE — PLAN OF CARE
Problem: Patient Care Overview (Adult)  Goal: Plan of Care Review  Outcome: Ongoing (interventions implemented as appropriate)   01/08/18 1123   Coping/Psychosocial Response Interventions   Plan Of Care Reviewed With patient   Outcome Evaluation   Outcome Summary/Follow up Plan Pt sitting balance improved today. Atempted standing x3 at EOB, but posteior lean presesnt.

## 2018-01-08 NOTE — PLAN OF CARE
Problem: Patient Care Overview (Adult)  Goal: Plan of Care Review  Outcome: Ongoing (interventions implemented as appropriate)   01/08/18 3374   Coping/Psychosocial Response Interventions   Plan Of Care Reviewed With patient   Patient Care Overview   Progress improving   Outcome Evaluation   Outcome Summary/Follow up Plan pt continues IV ABT; held norvasc r/t hypotension--phys discontinued; ledezma r/t acute retention; diet puree with thins; full code; possible discharge today to rehab facility; sb/ns on the monitor       Problem: Confusion, Acute (Adult)  Goal: Safety  Outcome: Ongoing (interventions implemented as appropriate)      Problem: Skin Integrity Impairment, Risk/Actual (Adult)  Goal: Skin Integrity/Wound Healing  Outcome: Ongoing (interventions implemented as appropriate)      Problem: Fall Risk (Adult)  Goal: Absence of Falls  Outcome: Ongoing (interventions implemented as appropriate)      Problem: Pressure Ulcer Risk (Jesse Scale) (Adult,Obstetrics,Pediatric)  Goal: Skin Integrity  Outcome: Ongoing (interventions implemented as appropriate)

## 2018-01-08 NOTE — THERAPY TREATMENT NOTE
Acute Care - Physical Therapy Treatment Note  [facility]     Patient Name: Alexandro Hallman  : 1940  MRN: [id number]  Today's Date: 2018  Onset of Illness/Injury or Date of Surgery Date: 18  Date of Referral to PT: 18  Referring Physician: Iona Toro    Admit Date: 1/3/2018    Visit Dx:    ICD-10-CM ICD-9-CM   1. Altered mental status, unspecified altered mental status type R41.82 780.97   2. Acute metabolic encephalopathy G93.41 348.31   3. Left pulmonary infiltrate on CXR R91.8 793.19     Patient Active Problem List   Diagnosis   • Alzheimer's type dementia   • Central spinal stenosis   • Essential hypertension   • Hypothyroidism   • Benign prostatic hypertrophy (BPH) with incomplete bladder emptying   • Communicating hydrocephalus   • Parkinson's disease   • Gait instability   • Weakness   • Hallucinations, unspecified   • Generalized weakness   • Pneumonia   • Acute metabolic encephalopathy   • Altered mental status   • Metabolic encephalopathy   • Toxic encephalopathy               Adult Rehabilitation Note       18 1100 18 1115       Rehab Assessment/Intervention    Discipline physical therapy assistant  -RW physical therapist  -SV     Document Type therapy note (daily note)  -RW therapy note (daily note)  -SV     Subjective Information agree to therapy;no complaints  -RW agree to therapy;no complaints  -SV     Patient Effort, Rehab Treatment adequate  -RW adequate  -SV     Symptoms Noted During/After Treatment  fatigue  -SV     Symptoms Noted Comment  pt sat on eob for 4-5 minutes: less responsive, leaning post  -SV     Precautions/Limitations fall precautions  -RW fall precautions;other (see comments)   cognitive issues  -SV     Specific Treatment Considerations  Parkinsons: becoming stiff at end rom, rigidity  -SV     Recorded by [RW] Kalli Moreland, PTA [SV] Dora Cruz, PT     Pain Assessment    Pain Assessment No/denies pain  -RW No/denies pain  -SV      Recorded by [RW] Kalli Moreland PTA [SV] Dora Cruz, PT     Cognitive Assessment/Intervention    Current Cognitive/Communication Assessment impaired  -RW impaired  -SV     Orientation Status oriented to;person  -RW      Follows Commands/Answers Questions 50% of the time;needs cueing;needs repetition  -RW 25% of the time;able to follow single-step instructions;needs repetition;needs increased time;needs cueing  -SV     Personal Safety severe impairment  -RW      Personal Safety Interventions fall prevention program maintained;gait belt;nonskid shoes/slippers when out of bed  -RW      Recorded by [RW] Kalli Moreland PTA [SV] Dora Cruz, PT     Bed Mobility, Assessment/Treatment    Bed Mobility, Assistive Device head of bed elevated;draw sheet  -RW      Bed Mobility, Scoot/Bridge, [place]  dependent (less than 25% patient effort);2 person assist required  -SV     Bed Mob, Supine to Sit, [place] maximum assist (25% patient effort);2 person assist required;verbal cues required;nonverbal cues required (demo/gesture)  -RW maximum assist (25% patient effort)  -SV     Bed Mob, Sit to Supine, [place] maximum assist (25% patient effort);2 person assist required;verbal cues required;nonverbal cues required (demo/gesture)  -RW dependent (less than 25% patient effort)  -SV     Bed Mobility, Safety Issues cognitive deficits limit understanding;decreased use of arms for pushing/pulling;decreased use of legs for bridging/pushing;impaired trunk control for bed mobility  -RW      Bed Mobility, Impairments strength decreased;impaired balance;coordination impaired;postural control impaired  -RW      Bed Mobility, Comment  sat eob with post lean, 4-5 minutes, cga, min or mod at times  -SV     Recorded by [RW] Kalli Moreland PTA [SV] Dora Cruz, PT     Transfer Assessment/Treatment    Transfers, Sit-Stand [place] maximum assist (25% patient effort);2 person assist required;verbal cues  required;nonverbal cues required (demo/gesture)  -RW unable to perform;not appropriate to assess  -SV     Transfers, Stand-Sit [place] maximum assist (25% patient effort);2 person assist required;verbal cues required;nonverbal cues required (demo/gesture)  -RW      Transfers, Sit-Stand-Sit, Assist Device rolling walker;elevated surface  -RW      Transfer, Safety Issues weight-shifting ability decreased;loses balance backward  -RW      Transfer, Impairments strength decreased;impaired balance;coordination impaired;postural control impaired  -RW      Transfer, Comment Stood x3. Posterior lean upon standing  -RW      Recorded by [RW] Kalli Moreland PTA [SV] Dora Cruz PT     Gait Assessment/Treatment    Gait, [place] Level not tested  -RW      Recorded by [RW] Kalli Moreland PTA      Therapy Exercises    Bilateral Lower Extremities AAROM:;AROM:;5 reps;supine;sitting;hip abduction/adduction;SLR;LAQ  -RW PROM:   attempted ex on eob: unable, prom yecenia HS stretch, PF stretch  -SV     Bilateral Upper Extremity  AAROM:;5 reps;shoulder extension/flexion;shoulder horizontal abd/add  -SV     Recorded by [RW] Kalli Moreland PTA [SV] Dora Cruz PT     Positioning and Restraints    Pre-Treatment Position in bed  -RW in bed  -SV     Post Treatment Position bed  -RW bed  -SV     In Bed fowlers;call light within reach;encouraged to call for assist;exit alarm on;with family/caregiver;SCD pump applied  -RW notified nsg;call light within reach;encouraged to call for assist;exit alarm on;with family/caregiver  -SV     Recorded by [RW] Kalli Moreland PTA [SV] Dora Cruz PT       User Key  (r) = Recorded By, (t) = Taken By, (c) = Cosigned By    Initials Name Effective Dates    SV Dora Cruz PT 01/17/16 -     RW Kalli Moreland PTA 04/06/16 -                 IP PT Goals       01/05/18 1123          Bed Mobility PT LTG    Bed Mobility PT LTG, Date Established 01/05/18  -MS      Bed Mobility PT LTG,  Time to Achieve 2 wks  -MS      Bed Mobility PT LTG, Activity Type all bed mobility  -MS      Bed Mobility PT LTG, [place] Level minimum assist (75% patient effort);2 person assist required  -MS      Transfer Training PT LTG    Transfer Training PT LTG, Date Established 01/05/18  -MS      Transfer Training PT LTG, Time to Achieve 2 wks  -MS      Transfer Training PT LTG, Activity Type sit to stand/stand to sit  -MS      Transfer Training PT LTG, [place] minimum assist (75% patient effort);2 person assist required  -MS      Transfer Training PT LTG, Assist Device walker, rolling  -MS      Transfer Training 2 PT LTG    Transfer Training PT 2 LTG, Date Established 01/05/18  -MS      Transfer Training PT 2 LTG, Time to Achieve 2 wks  -MS      Transfer Training PT 2 LTG, Activity Type bed to chair /chair to bed  -MS      Transfer Training PT 2 LTG, [place] minimum assist (75% patient effort);2 person assist required  -MS      Transfer Training PT 2 LTG, Assist Device walker, rolling  -MS      Gait Training PT LTG    Gait Training Goal PT LTG, Date Established 01/05/18  -MS      Gait Training Goal PT LTG, Time to Achieve 2 wks  -MS      Gait Training Goal PT LTG, [place] minimum assist (75% patient effort);2 person assist required  -MS      Gait Training Goal PT LTG, Assist Device walker, rolling  -MS      Gait Training Goal PT LTG, Distance to Achieve 30 feet  -MS        User Key  (r) = Recorded By, (t) = Taken By, (c) = Cosigned By    Initials Name Provider Type    MS Jarvis Thomas, PT Physical Therapist          Physical Therapy Education     Title: PT OT SLP Therapies (Active)     Topic: Physical Therapy (Active)     Point: Mobility training (Active)    Learning Progress Summary    Learner Readiness Method Response Comment Documented by Status   Patient Acceptance MILVIA MONDRAGON D NR  RW 01/08/18 1124 Active    Acceptance MILVIA MONDRAGON D NR,VU  SV 01/07/18 1129 Done    Nonacceptance CECELIA MONDRAGON  MS  01/05/18 1123 Active   Family Acceptance E,TB,D NR,VU  SV 01/07/18 1129 Done               Point: Home exercise program (Active)    Learning Progress Summary    Learner Readiness Method Response Comment Documented by Status   Patient Acceptance E,TB,D NR  RW 01/08/18 1124 Active    Acceptance E,TB,D NR,VU  SV 01/07/18 1129 Done    Nonacceptance E,D NR  MS 01/05/18 1123 Active   Family Acceptance E,TB,D NR,VU  SV 01/07/18 1129 Done               Point: Body mechanics (Active)    Learning Progress Summary    Learner Readiness Method Response Comment Documented by Status   Patient Acceptance E,TB,D NR   01/08/18 1124 Active    Nonacceptance E,D NR  MS 01/05/18 1123 Active               Point: Precautions (Active)    Learning Progress Summary    Learner Readiness Method Response Comment Documented by Status   Patient Acceptance E,TB,D NR   01/08/18 1124 Active    Nonacceptance E,D NR  MS 01/05/18 1123 Active                      User Key     Initials Effective Dates Name Provider Type Discipline    MS 12/01/15 -  Jarvis Thomas, PT Physical Therapist PT     01/17/16 -  Dora Cruz, PT Physical Therapist PT     04/06/16 -  Kalli Moreland, PTA Physical Therapy Assistant PT                    PT Recommendation and Plan  Anticipated Discharge Disposition: skilled nursing facility  Planned Therapy Interventions: balance training, bed mobility training, gait training, home exercise program, patient/family education, postural re-education, ROM (Range of Motion), strengthening, transfer training  PT Frequency: daily  Plan of Care Review  Plan Of Care Reviewed With: patient  Outcome Summary/Follow up Plan: Pt sitting balance improved today. Atempted standing x3 at EOB, but posteior lean presesnt.           Outcome Measures       01/08/18 1100 01/07/18 1100       How much help from another person do you currently need...    Turning from your back to your side while in flat bed without using bedrails? 2  -RW 1  -SV      Moving from lying on back to sitting on the side of a flat bed without bedrails? 2  -RW 1  -SV     Moving to and from a bed to a chair (including a wheelchair)? 1  -RW 1  -SV     Standing up from a chair using your arms (e.g., wheelchair, bedside chair)? 2  -RW 1  -SV     Climbing 3-5 steps with a railing? 1  -RW 1  -SV     To walk in hospital room? 1  -RW 1  -SV     AM-PAC 6 Clicks Score 9  -RW 6  -SV     Functional Assessment    Outcome Measure Options AM-PAC 6 Clicks Basic Mobility (PT)  -RW AM-PAC 6 Clicks Basic Mobility (PT)  -SV       User Key  (r) = Recorded By, (t) = Taken By, (c) = Cosigned By    Initials Name Provider Type    SV Dora Cruz, PT Physical Therapist    RW Kalli Moreland PTA Physical Therapy Assistant           Time Calculation:         PT Charges       01/08/18 1108          Time Calculation    Start Time 1106  -RW      Stop Time 1129  -RW      Time Calculation (min) 23 min  -RW      PT Received On 01/08/18  -RW      PT - Next Appointment 01/09/18  -        User Key  (r) = Recorded By, (t) = Taken By, (c) = Cosigned By    Initials Name Provider Type    RW Kalli Moreland PTA Physical Therapy Assistant          Therapy Charges for Today     Code Description Service Date Service Provider Modifiers Qty    43312627295 HC PT THER PROC EA 15 MIN 1/8/2018 Kalli Moreland PTA GP 2    70964007457 HC PT THER SUPP EA 15 MIN 1/8/2018 Kalli Moreland PTA GP 2          PT G-Codes  Outcome Measure Options: AM-PAC 6 Clicks Basic Mobility (PT)    Kalli Moreland PTA  1/8/2018

## 2018-01-08 NOTE — PROGRESS NOTES
Continued Stay Note  [facility]     Patient Name: Alexandro Hallman  MRN: [id number]  Today's Date: 1/8/2018    Admit Date: 1/3/2018          Discharge Plan       01/08/18 1159    Case Management/Social Work Plan    Plan Rehab    Additional Comments Discussed discharge plans with wife Mariam and daughter Fransisca, ultimate goal is for patient to return home with wife, but at this time he needs rehab to get his strength and endurance improved.  Wife is concerned that only 2 days a week of home therapy will not be enough to improve his strength and endurance, reviewed rehab options.  Wife and family decided on the 3 facilities in the [facility], [facility] and [facility].  Will continue to monitor for discharge needs.               Discharge Codes     None            Nancy Givens RN

## 2018-01-08 NOTE — PROGRESS NOTES
Continued Stay Note  [facility]     Patient Name: Alexandro Hallman  MRN: [id number]  Today's Date: 1/8/2018    Admit Date: 1/3/2018          Discharge Plan       01/08/18 1659    Case Management/Social Work Plan    Plan Rehab    Patient/Family In Agreement With Plan yes    Additional Comments Outreach to Anaya/Brayden for [facility], Lindy/Ronnie for [facility] and Jacki Faye [facility].  Pending decision on which facility will accept.                Discharge Codes     None            Nancy Givens RN

## 2018-01-08 NOTE — PLAN OF CARE
Problem: Patient Care Overview (Adult)  Goal: Discharge Needs Assessment  Outcome: Ongoing (interventions implemented as appropriate)      Problem: Confusion, Acute (Adult)  Goal: Cognitive/Functional Impairments Minimized  Outcome: Ongoing (interventions implemented as appropriate)    Goal: Safety  Outcome: Ongoing (interventions implemented as appropriate)      Problem: Skin Integrity Impairment, Risk/Actual (Adult)  Goal: Skin Integrity/Wound Healing  Outcome: Ongoing (interventions implemented as appropriate)      Problem: Fall Risk (Adult)  Goal: Absence of Falls  Outcome: Ongoing (interventions implemented as appropriate)      Problem: Pressure Ulcer Risk (Jesse Scale) (Adult,Obstetrics,Pediatric)  Goal: Skin Integrity  Outcome: Ongoing (interventions implemented as appropriate)

## 2018-01-08 NOTE — PROGRESS NOTES
"   LOS: 5 days   Patient Care Team:  Buster Howe MD as PCP - General (Family Medicine)  Buster Howe MD as PCP - Family Medicine  Spenser Borrero MD as Consulting Physician (Cardiology)  Marielle Braxton MD (Inactive) as Consulting Physician (Neurology)    Chief Complaint: tired    Subjective     HPI Comments: Feeling better today. Eating well. Making urine. No O2 requirement. Restless last PM. No BM for some time now    Altered Mental Status   Associated symptoms include weakness. Pertinent negatives include no anorexia, chest pain, coughing, fever, nausea or vomiting.       Subjective:  Symptoms:  Stable.  He reports weakness.  No shortness of breath, malaise, cough, chest pain, headache, chest pressure, anorexia, diarrhea or anxiety.    Diet:  Adequate intake.  No nausea or vomiting.    Activity level: Impaired due to weakness.    Pain:  He reports no pain.        History taken from: patient chart family    Objective     Vital Signs  Temp:  [97.8 °F (36.6 °C)-98.8 °F (37.1 °C)] 98.5 °F (36.9 °C)  Heart Rate:  [54-69] 69  Resp:  [18] 18  BP: ()/(55-76) 88/55    Objective:  General Appearance:  Comfortable and in no acute distress.    Vital signs: (most recent): Blood pressure (!) 88/55, pulse 69, temperature 98.5 °F (36.9 °C), temperature source Oral, resp. rate 18, height 190.5 cm (75\"), weight 72.8 kg (160 lb 6.4 oz), SpO2 97 %.  Vital signs are normal.  No fever.  (BPs low).    Output: Producing urine and no stool output.    HEENT: Normal HEENT exam.    Lungs:  Normal respiratory rate and normal effort.  Breath sounds clear to auscultation.    Heart: Normal rate.  Regular rhythm.    Abdomen: Abdomen is soft.  Bowel sounds are normal.   There is no abdominal tenderness.     Extremities: There is no dependent edema.    Pulses: Distal pulses are intact.    Neurological: Patient is alert.    Pupils:  Pupils are equal, round, and reactive to light.    Skin:  Warm and dry.              Results Review:  "    I reviewed the patient's new clinical results.  I reviewed the patient's other test results and agree with the interpretation  Discussed with patient and wife    Medication Review: reviewed and adjusted    Assessment/Plan     Principal Problem:    Acute metabolic encephalopathy  Active Problems:    Alzheimer's type dementia    Central spinal stenosis    Essential hypertension    Hypothyroidism    Communicating hydrocephalus    Parkinson's disease    Generalized weakness    Pneumonia    Altered mental status    Metabolic encephalopathy    Toxic encephalopathy          Plan:   (Continue Zosyn, tomorrow will be day #7 (and final day)  WBC normalized  Continue PT  Mirapex and Risperdal dc'd and seems to be doing a little better now  Continue Flomax and ledezma for now, voiding trial when mobility improved  DC'd IVFs  ST bui noted, po precautions in place  Bowel regimen, increase Miralax to BID  DC Amlodipine and monitor BPs  Wife now okay with plans for SNF at dc, CCP to assist).       Abhishek Cade MD  01/08/18  4:15 PM    Time: 25min

## 2018-01-09 NOTE — PLAN OF CARE
Problem: Patient Care Overview (Adult)  Goal: Plan of Care Review  Outcome: Ongoing (interventions implemented as appropriate)   01/09/18 1131   Coping/Psychosocial Response Interventions   Plan Of Care Reviewed With patient;spouse   Patient Care Overview   Progress progress toward functional goals as expected   Outcome Evaluation   Outcome Summary/Follow up Plan discharge plans discussed with patient and spouse; bradycardic on monitor; no c/o pain; calm/cocoperative; AAOx2; up with PT        Problem: Confusion, Acute (Adult)  Goal: Cognitive/Functional Impairments Minimized  Outcome: Ongoing (interventions implemented as appropriate)   01/09/18 1131   Confusion, Acute (Adult)   Cognitive/Functional Impairments Minimized making progress toward outcome       Problem: Skin Integrity Impairment, Risk/Actual (Adult)  Goal: Identify Related Risk Factors and Signs and Symptoms  Outcome: Ongoing (interventions implemented as appropriate)   01/09/18 1131   Skin Integrity Impairment, Risk/Actual   Skin Integrity Impairment, Risk/Actual: Related Risk Factors age extremes;immobility;cognitive impairment     Goal: Skin Integrity/Wound Healing  Outcome: Ongoing (interventions implemented as appropriate)   01/09/18 1131   Skin Integrity Impairment, Risk/Actual (Adult)   Skin Integrity/Wound Healing making progress toward outcome       Problem: Fall Risk (Adult)  Goal: Absence of Falls  Outcome: Ongoing (interventions implemented as appropriate)   01/09/18 1131   Fall Risk (Adult)   Absence of Falls making progress toward outcome       Problem: Pressure Ulcer Risk (Jesse Scale) (Adult,Obstetrics,Pediatric)  Goal: Skin Integrity  Outcome: Ongoing (interventions implemented as appropriate)   01/09/18 1131   Pressure Ulcer Risk (Jesse Scale) (Adult,Obstetrics,Pediatric)   Skin Integrity making progress toward outcome

## 2018-01-09 NOTE — THERAPY TREATMENT NOTE
Acute Care - Physical Therapy Treatment Note  [facility]     Patient Name: Alexandro Hallman  : 1940  MRN: [id number]  Today's Date: 2018  Onset of Illness/Injury or Date of Surgery Date: 18  Date of Referral to PT: 18  Referring Physician: Iona Toro    Admit Date: 1/3/2018    Visit Dx:    ICD-10-CM ICD-9-CM   1. Altered mental status, unspecified altered mental status type R41.82 780.97   2. Acute metabolic encephalopathy G93.41 348.31   3. Left pulmonary infiltrate on CXR R91.8 793.19     Patient Active Problem List   Diagnosis   • Alzheimer's type dementia   • Central spinal stenosis   • Essential hypertension   • Hypothyroidism   • Benign prostatic hypertrophy (BPH) with incomplete bladder emptying   • Communicating hydrocephalus   • Parkinson's disease   • Gait instability   • Weakness   • Hallucinations, unspecified   • Generalized weakness   • Pneumonia   • Acute metabolic encephalopathy   • Altered mental status   • Metabolic encephalopathy   • Toxic encephalopathy               Adult Rehabilitation Note       18 1120 18 1100 18 1115    Rehab Assessment/Intervention    Discipline physical therapy assistant  -RW physical therapy assistant  -RW physical therapist  -SV    Document Type therapy note (daily note)  -RW therapy note (daily note)  -RW therapy note (daily note)  -SV    Subjective Information agree to therapy;decreased LOC  -RW agree to therapy;no complaints  -RW agree to therapy;no complaints  -SV    Patient Effort, Rehab Treatment adequate  -RW adequate  -RW adequate  -SV    Symptoms Noted During/After Treatment   fatigue  -SV    Symptoms Noted Comment   pt sat on eob for 4-5 minutes: less responsive, leaning post  -SV    Precautions/Limitations fall precautions  -RW fall precautions  -RW fall precautions;other (see comments)   cognitive issues  -SV    Specific Treatment Considerations More lethargic today, but more alert when sitting  -RW   Parkinsons: becoming stiff at end rom, rigidity  -SV    Recorded by [RW] Kalli Moreland PTA [RW] Kalli Moreland PTA [SV] Dora Cruz, PT    Pain Assessment    Pain Assessment No/denies pain  -RW No/denies pain  -RW No/denies pain  -SV    Recorded by [RW] Kalli Moreland PTA [RW] Kalli Moreland, ESTEFANIA [SV] Dora Cruz, PT    Cognitive Assessment/Intervention    Current Cognitive/Communication Assessment impaired  -RW impaired  -RW impaired  -SV    Orientation Status oriented to;person  -RW oriented to;person  -RW     Follows Commands/Answers Questions 25% of the time;needs cueing;needs increased time;needs repetition  -RW 50% of the time;needs cueing;needs repetition  -RW 25% of the time;able to follow single-step instructions;needs repetition;needs increased time;needs cueing  -SV    Personal Safety severe impairment  -RW severe impairment  -RW     Personal Safety Interventions fall prevention program maintained;gait belt;nonskid shoes/slippers when out of bed  -RW fall prevention program maintained;gait belt;nonskid shoes/slippers when out of bed  -RW     Recorded by [RW] Kalli Moreland PTA [RW] Kalli Moreland, ESTEFANIA [SV] Dora Cruz, PT    Bed Mobility, Assessment/Treatment    Bed Mobility, Assistive Device bed rails;head of bed elevated;draw sheet  -RW head of bed elevated;draw sheet  -RW     Bed Mobility, Scoot/Bridge, Shannon   dependent (less than 25% patient effort);2 person assist required  -SV    Bed Mob, Supine to Sit, Shannon maximum assist (25% patient effort);dependent (less than 25% patient effort);2 person assist required;verbal cues required;nonverbal cues required (demo/gesture)  -RW maximum assist (25% patient effort);2 person assist required;verbal cues required;nonverbal cues required (demo/gesture)  -RW maximum assist (25% patient effort)  -SV    Bed Mob, Sit to Supine, [place] maximum assist (25% patient effort);dependent (less than 25% patient effort);2 person  assist required;verbal cues required;nonverbal cues required (demo/gesture)  -RW maximum assist (25% patient effort);2 person assist required;verbal cues required;nonverbal cues required (demo/gesture)  -RW dependent (less than 25% patient effort)  -SV    Bed Mobility, Safety Issues cognitive deficits limit understanding  -RW cognitive deficits limit understanding;decreased use of arms for pushing/pulling;decreased use of legs for bridging/pushing;impaired trunk control for bed mobility  -RW     Bed Mobility, Impairments strength decreased;impaired balance  -RW strength decreased;impaired balance;coordination impaired;postural control impaired  -RW     Bed Mobility, Comment Posterior lean in sitting  -RW  sat eob with post lean, 4-5 minutes, cga, min or mod at times  -SV    Recorded by [RW] Kalli Moreland PTA [RW] Kalli Moreland PTA [SV] Dora Cruz, PT    Transfer Assessment/Treatment    Transfers, Sit-Stand [place] unable to perform  -RW maximum assist (25% patient effort);2 person assist required;verbal cues required;nonverbal cues required (demo/gesture)  -RW unable to perform;not appropriate to assess  -SV    Transfers, Stand-Sit Hale  maximum assist (25% patient effort);2 person assist required;verbal cues required;nonverbal cues required (demo/gesture)  -RW     Transfers, Sit-Stand-Sit, Assist Device  rolling walker;elevated surface  -RW     Transfer, Safety Issues  weight-shifting ability decreased;loses balance backward  -RW     Transfer, Impairments  strength decreased;impaired balance;coordination impaired;postural control impaired  -RW     Transfer, Comment Attempted standing x2 at EOB, but pt not keeping B feet on floor. Attempted w/ and w/o RW to stand.  -RW Stood x3. Posterior lean upon standing  -RW     Recorded by [RW] Kalli Moreland PTA [RW] Kalli Moreland PTA [SV] Dora Cruz, PT    Gait Assessment/Treatment    Gait, Hale Level  not tested  -RW     Recorded by   [RW] Kalli Moreland PTA     Balance Skills Training    Sitting-Level of Assistance Minimum assistance;Moderate assistance  -RW      Sitting-Balance Support Feet supported;Right upper extremity supported;Left upper extremity supported  -RW      Sitting-Balance Activities Forward lean;Trunk control activities  -RW      Sitting # of Minutes 7  -RW      Recorded by [RW] Kalli Moreland PTA      Therapy Exercises    Bilateral Lower Extremities  AAROM:;AROM:;5 reps;supine;sitting;hip abduction/adduction;SLR;LAQ  -RW PROM:   attempted ex on eob: unable, prom yecenia HS stretch, PF stretch  -SV    Bilateral Upper Extremity   AAROM:;5 reps;shoulder extension/flexion;shoulder horizontal abd/add  -SV    Recorded by  [RW] Kalli Moreland PTA [SV] Dora Cruz PT    Positioning and Restraints    Pre-Treatment Position in bed  -RW in bed  -RW in bed  -SV    Post Treatment Position bed  -RW bed  -RW bed  -SV    In Bed fowlers;call light within reach;encouraged to call for assist;exit alarm on;with family/caregiver  -RW fowlers;call light within reach;encouraged to call for assist;exit alarm on;with family/caregiver;SCD pump applied  -RW notified nsg;call light within reach;encouraged to call for assist;exit alarm on;with family/caregiver  -SV    Recorded by [RW] Kalli Moreland PTA [RW] Kalli Moreland PTA [SV] Dora Cruz, PT      User Key  (r) = Recorded By, (t) = Taken By, (c) = Cosigned By    Initials Name Effective Dates    SV Dora Cruz, ALDAIR 01/17/16 -     RW Kalli Moreland PTA 04/06/16 -                 IP PT Goals       01/05/18 1123          Bed Mobility PT LTG    Bed Mobility PT LTG, Date Established 01/05/18  -MS      Bed Mobility PT LTG, Time to Achieve 2 wks  -MS      Bed Mobility PT LTG, Activity Type all bed mobility  -MS      Bed Mobility PT LTG, [place] Level minimum assist (75% patient effort);2 person assist required  -MS      Transfer Training PT LTG    Transfer Training PT LTG, Date  Established 01/05/18  -MS      Transfer Training PT LTG, Time to Achieve 2 wks  -MS      Transfer Training PT LTG, Activity Type sit to stand/stand to sit  -MS      Transfer Training PT LTG, [place] Level minimum assist (75% patient effort);2 person assist required  -MS      Transfer Training PT LTG, Assist Device walker, rolling  -MS      Transfer Training 2 PT LTG    Transfer Training PT 2 LTG, Date Established 01/05/18  -MS      Transfer Training PT 2 LTG, Time to Achieve 2 wks  -MS      Transfer Training PT 2 LTG, Activity Type bed to chair /chair to bed  -MS      Transfer Training PT 2 LTG, [place] Level minimum assist (75% patient effort);2 person assist required  -MS      Transfer Training PT 2 LTG, Assist Device walker, rolling  -MS      Gait Training PT LTG    Gait Training Goal PT LTG, Date Established 01/05/18  -MS      Gait Training Goal PT LTG, Time to Achieve 2 wks  -MS      Gait Training Goal PT LTG, [place] Level minimum assist (75% patient effort);2 person assist required  -MS      Gait Training Goal PT LTG, Assist Device walker, rolling  -MS      Gait Training Goal PT LTG, Distance to Achieve 30 feet  -MS        User Key  (r) = Recorded By, (t) = Taken By, (c) = Cosigned By    Initials Name Provider Type    MS Jarvis Thomas, PT Physical Therapist          Physical Therapy Education     Title: PT OT SLP Therapies (Active)     Topic: Physical Therapy (Active)     Point: Mobility training (Active)    Learning Progress Summary    Learner Readiness Method Response Comment Documented by Status   Patient Acceptance E,TB,D NR   01/09/18 1307 Active    Acceptance E,TB,D NR   01/08/18 1124 Active    Acceptance E,TB,D NR,VU  SV 01/07/18 1129 Done    Nonacceptance E,D NR  MS 01/05/18 1123 Active   Family Acceptance E,TB,D NR   01/09/18 1307 Active    Acceptance E,TB,D NR,VU  SV 01/07/18 1129 Done               Point: Home exercise program (Active)    Learning Progress Summary    Learner  Readiness Method Response Comment Documented by Status   Patient Acceptance E,TB,D NR  RW 01/08/18 1124 Active    Acceptance E,TB,D NR,VU  SV 01/07/18 1129 Done    Nonacceptance E,D NR  MS 01/05/18 1123 Active   Family Acceptance E,TB,D NR,VU  SV 01/07/18 1129 Done               Point: Body mechanics (Active)    Learning Progress Summary    Learner Readiness Method Response Comment Documented by Status   Patient Acceptance E,TB,D NR   01/09/18 1307 Active    Acceptance E,TB,D NR   01/08/18 1124 Active    Nonacceptance E,D NR  MS 01/05/18 1123 Active   Family Acceptance E,TB,D NR   01/09/18 1307 Active               Point: Precautions (Active)    Learning Progress Summary    Learner Readiness Method Response Comment Documented by Status   Patient Acceptance E,TB,D NR   01/09/18 1307 Active    Acceptance E,TB,D NR   01/08/18 1124 Active    Nonacceptance E,D NR  MS 01/05/18 1123 Active   Family Acceptance E,TB,D NR   01/09/18 1307 Active                      User Key     Initials Effective Dates Name Provider Type Discipline    MS 12/01/15 -  Jarvis Thomas, PT Physical Therapist PT     01/17/16 -  Dora Cruz, PT Physical Therapist PT     04/06/16 -  Kalli Moreland, PTA Physical Therapy Assistant PT                    PT Recommendation and Plan  Anticipated Discharge Disposition: skilled nursing facility  Planned Therapy Interventions: balance training, bed mobility training, gait training, home exercise program, patient/family education, postural re-education, ROM (Range of Motion), strengthening, transfer training  PT Frequency: daily  Plan of Care Review  Plan Of Care Reviewed With: patient  Outcome Summary/Follow up Plan: Pt more lethargic today. Able to sit at EOB w/ posterior lean, but able to correct w/ cueing. Unable to stand at EOB, but contributed more to cognitive status than overall weakness.           Outcome Measures       01/09/18 1120 01/08/18 1100 01/07/18 1100    How much help  from another person do you currently need...    Turning from your back to your side while in flat bed without using bedrails? 2  -RW 2  -RW 1  -SV    Moving from lying on back to sitting on the side of a flat bed without bedrails? 2  -RW 2  -RW 1  -SV    Moving to and from a bed to a chair (including a wheelchair)? 1  -RW 1  -RW 1  -SV    Standing up from a chair using your arms (e.g., wheelchair, bedside chair)? 1  -RW 2  -RW 1  -SV    Climbing 3-5 steps with a railing? 1  -RW 1  -RW 1  -SV    To walk in hospital room? 1  -RW 1  -RW 1  -SV    AM-PAC 6 Clicks Score 8  -RW 9  -RW 6  -SV    Functional Assessment    Outcome Measure Options AM-PAC 6 Clicks Basic Mobility (PT)  -RW AM-PAC 6 Clicks Basic Mobility (PT)  -RW AM-PAC 6 Clicks Basic Mobility (PT)  -SV      User Key  (r) = Recorded By, (t) = Taken By, (c) = Cosigned By    Initials Name Provider Type    SV Dora Cruz, PT Physical Therapist    RW Kalli Moreland PTA Physical Therapy Assistant           Time Calculation:         PT Charges       01/09/18 1124          Time Calculation    Start Time 1120  -RW      Stop Time 1135  -RW      Time Calculation (min) 15 min  -RW      PT Received On 01/09/18  -RW      PT - Next Appointment 01/10/18  -RW        User Key  (r) = Recorded By, (t) = Taken By, (c) = Cosigned By    Initials Name Provider Type     Kalli Moreland PTA Physical Therapy Assistant          Therapy Charges for Today     Code Description Service Date Service Provider Modifiers Qty    93731378514 HC PT THER PROC EA 15 MIN 1/8/2018 Kalli Moreland PTA GP 2    77781273928 HC PT THER SUPP EA 15 MIN 1/8/2018 Kalli Moreland PTA GP 2    80909973404 HC PT THER PROC EA 15 MIN 1/9/2018 Kalli Moreland PTA GP 1    30780621931 HC PT THER SUPP EA 15 MIN 1/9/2018 Kalli Moreland PTA GP 1          PT G-Codes  Outcome Measure Options: AM-PAC 6 Clicks Basic Mobility (PT)    Kalli Moreland PTA  1/9/2018

## 2018-01-09 NOTE — PROGRESS NOTES
Adult Nutrition  Assessment/PES    Patient Name:  Alexandro Hallman  YOB: 1940  MRN: [id number]  Admit Date:  1/3/2018    Assessment Date:  1/9/2018    Comments:  Follow up:  Fed by spouse, good intake.  Planning d/c to rehab. Follow as needed.           Reason for Assessment       01/09/18 1504    Reason for Assessment    Reason For Assessment/Visit follow up protocol              Nutrition/Diet History       01/09/18 1505    Nutrition/Diet History    Other Fed by spouse, eating well            Anthropometrics       01/09/18 1505    Anthropometrics    RD Documented Current Weight  72.6 kg (160 lb)            Labs/Tests/Procedures/Meds       01/09/18 1505    Labs/Tests/Procedures/Meds    Diagnostic Test/Procedure Review reviewed    Labs/Tests Review Reviewed    Medication Review Reviewed, pertinent    Significant Vitals reviewed            Physical Findings       01/09/18 1505    Physical Appearance    Skin --   intact              Nutrition Prescription Ordered       01/09/18 1506    Nutrition Prescription PO    Current PO Diet Pureed    Fluid Consistency Thin              Problem/Interventions:                  Intervention Goal       01/09/18 1506    Intervention Goal    General Maintain nutrition    PO Maintain intake;Tolerate PO    Weight No significant weight loss            Nutrition Intervention       01/09/18 1506    Nutrition Intervention    RD/Tech Action Follow Tx progress;Care plan reviewd              Education/Evaluation       01/09/18 1506    Monitor/Evaluation    Monitor Per protocol;PO intake        Electronically signed by:  Kayla Harvey RD  01/09/18 3:07 PM

## 2018-01-09 NOTE — PROGRESS NOTES
"   LOS: 6 days   Patient Care Team:  Buster Howe MD as PCP - General (Family Medicine)  Buster Howe MD as PCP - Family Medicine  Spenser Borrero MD as Consulting Physician (Cardiology)  Marielle Braxton MD (Inactive) as Consulting Physician (Neurology)    Chief Complaint: tired    Subjective     HPI Comments: Feeling the same today. Tired.     Altered Mental Status   Associated symptoms include weakness. Pertinent negatives include no anorexia, chest pain, coughing, fever, nausea or vomiting.       Subjective:  Symptoms:  Stable.  He reports weakness.  No shortness of breath, malaise, cough, chest pain, headache, chest pressure, anorexia, diarrhea or anxiety.    Diet:  Adequate intake.  No nausea or vomiting.    Activity level: Impaired due to weakness.    Pain:  He reports no pain.        History taken from: patient chart family    Objective     Vital Signs  Temp:  [97.6 °F (36.4 °C)-98.5 °F (36.9 °C)] 97.6 °F (36.4 °C)  Heart Rate:  [54-61] 60  Resp:  [18-20] 20  BP: ()/(55-93) 126/93    Objective:  General Appearance:  Comfortable and in no acute distress.    Vital signs: (most recent): Blood pressure 126/93, pulse 60, temperature 97.6 °F (36.4 °C), temperature source Oral, resp. rate 20, height 190.5 cm (75\"), weight 72.8 kg (160 lb 6.4 oz), SpO2 96 %.  Vital signs are normal.  No fever.  (BPs better).    Output: Producing urine and no stool output.    HEENT: Normal HEENT exam.    Lungs:  Normal respiratory rate and normal effort.  Breath sounds clear to auscultation.    Heart: Normal rate.  Regular rhythm.    Abdomen: Abdomen is soft.  Bowel sounds are normal.   There is no abdominal tenderness.     Extremities: There is no dependent edema.    Pulses: Distal pulses are intact.    Neurological: Patient is alert.    Pupils:  Pupils are equal, round, and reactive to light.    Skin:  Warm and dry.              Results Review:     I reviewed the patient's new clinical results.  I reviewed the " patient's other test results and agree with the interpretation  Discussed with patient, wife, daughter    Medication Review: reviewed    Assessment/Plan     Principal Problem:    Acute metabolic encephalopathy  Active Problems:    Alzheimer's type dementia    Central spinal stenosis    Essential hypertension    Hypothyroidism    Communicating hydrocephalus    Parkinson's disease    Generalized weakness    Pneumonia    Altered mental status    Metabolic encephalopathy    Toxic encephalopathy          Plan:   (Completed 7d of Zosyn  WBC normalized  Continue PT  Mirapex and Risperdal dc'd and seems to be doing a little better now  Continue Flomax and ledezma for now, voiding trial when mobility improved  DC'd IVFs  ST bui noted, po precautions in place  Bowel regimen, increase Miralax to BID  DC'd Amlodipine and BPs much better today  Wife now okay with plans for SNF at dc, CCP to assist).       Abhishek Cade MD  01/09/18  1:05 PM    Time: 20min

## 2018-01-09 NOTE — PLAN OF CARE
Problem: Patient Care Overview (Adult)  Goal: Plan of Care Review  Outcome: Ongoing (interventions implemented as appropriate)   01/09/18 1306   Coping/Psychosocial Response Interventions   Plan Of Care Reviewed With patient   Outcome Evaluation   Outcome Summary/Follow up Plan Pt more lethargic today. Able to sit at EOB w/ posterior lean, but able to correct w/ cueing. Unable to stand at EOB, but contributed more to cognitive status than overall weakness.

## 2018-01-09 NOTE — PROGRESS NOTES
Continued Stay Note  [facility]     Patient Name: Alexandro Hallman  MRN: [id number]  Today's Date: 1/9/2018    Admit Date: 1/3/2018          Discharge Plan       01/09/18 1017    Case Management/Social Work Plan    Plan SNF- Referrals made to [facility], [facility] and [facility]- await evalutation- will need [employer] Medicare precert    Patient/Family In Agreement With Plan yes    Additional Comments Spoke with wife, Mariam, at bedside and she states she would like referrals to [facility](Geema- notifed at [phone]- await evaluation), [facility](Lena- notified at [phone]), and [facility](Geema notified at [phone])-  await evaluations.  WIll need [employer] Medicare precert.....CCP will follow and assist as needed..... Shala Celaya RN,CCP               Discharge Codes     None            Shala Celaya RN

## 2018-01-09 NOTE — PLAN OF CARE
Problem: Patient Care Overview (Adult)  Goal: Plan of Care Review  Outcome: Ongoing (interventions implemented as appropriate)   01/09/18 0620   Coping/Psychosocial Response Interventions   Plan Of Care Reviewed With patient   Patient Care Overview   Progress improving   Outcome Evaluation   Outcome Summary/Follow up Plan no complaints, pleasant, will continue to monitor      Goal: Adult Individualization and Mutuality  Outcome: Ongoing (interventions implemented as appropriate)    Goal: Discharge Needs Assessment  Outcome: Ongoing (interventions implemented as appropriate)      Problem: Fall Risk (Adult)  Goal: Absence of Falls  Outcome: Ongoing (interventions implemented as appropriate)      Problem: Pressure Ulcer Risk (Jesse Scale) (Adult,Obstetrics,Pediatric)  Goal: Skin Integrity  Outcome: Ongoing (interventions implemented as appropriate)

## 2018-01-10 NOTE — PLAN OF CARE
Problem: Patient Care Overview (Adult)  Goal: Plan of Care Review  Outcome: Ongoing (interventions implemented as appropriate)   01/10/18 1236   Coping/Psychosocial Response Interventions   Plan Of Care Reviewed With patient   Outcome Evaluation   Outcome Summary/Follow up Plan Pt still lethargic today. Able to sit at EOB, but posterior lean present. Not following commands to correct sitting balance. Attempted standing x3 at EOB, but unable to follow commands or clear EOB to stand.

## 2018-01-10 NOTE — PROGRESS NOTES
Continued Stay Note  [facility]     Patient Name: Alexandro Hallman  MRN: [id number]  Today's Date: 1/10/2018    Admit Date: 1/3/2018          Discharge Plan       01/10/18 1121    Case Management/Social Work Plan    Plan [facility] - accepted to skilled level - bed available 1/11/18, Thursday    Patient/Family In Agreement With Plan yes    Additional Comments Jayna with [facility] here to evaluate patient and has accepted patient and bed available and will not need any precert- just notification at admit.  Wife has accepted bed at [facility]..... Shala Celaya RN,CCP              Discharge Codes     None        Expected Discharge Date and Time     Expected Discharge Date Expected Discharge Time    Yasir 10, 2018             Shala Celaya RN

## 2018-01-10 NOTE — PLAN OF CARE
Problem: Patient Care Overview (Adult)  Goal: Plan of Care Review  Outcome: Ongoing (interventions implemented as appropriate)   01/10/18 0319   Coping/Psychosocial Response Interventions   Plan Of Care Reviewed With patient   Patient Care Overview   Progress no change   Outcome Evaluation   Outcome Summary/Follow up Plan pt remains lethargic. Will arouse upon voice. Pt oriented x1 person only. will continue to monitor.       Problem: Confusion, Acute (Adult)  Goal: Cognitive/Functional Impairments Minimized  Outcome: Ongoing (interventions implemented as appropriate)   01/10/18 0319   Confusion, Acute (Adult)   Cognitive/Functional Impairments Minimized making progress toward outcome     Goal: Safety  Outcome: Ongoing (interventions implemented as appropriate)   01/10/18 0319   Confusion, Acute (Adult)   Safety making progress toward outcome       Problem: Skin Integrity Impairment, Risk/Actual (Adult)  Goal: Identify Related Risk Factors and Signs and Symptoms  Outcome: Ongoing (interventions implemented as appropriate)   01/10/18 0319   Skin Integrity Impairment, Risk/Actual   Skin Integrity Impairment, Risk/Actual: Related Risk Factors age extremes;immobility;cognitive impairment   Signs and Symptoms (Skin Integrity Impairment) edema     Goal: Skin Integrity/Wound Healing  Outcome: Ongoing (interventions implemented as appropriate)   01/10/18 0319   Skin Integrity Impairment, Risk/Actual (Adult)   Skin Integrity/Wound Healing making progress toward outcome       Problem: Fall Risk (Adult)  Goal: Absence of Falls  Outcome: Ongoing (interventions implemented as appropriate)   01/10/18 0319   Fall Risk (Adult)   Absence of Falls making progress toward outcome       Problem: Pressure Ulcer Risk (Jesse Scale) (Adult,Obstetrics,Pediatric)  Goal: Skin Integrity  Outcome: Ongoing (interventions implemented as appropriate)   01/10/18 0319   Pressure Ulcer Risk (Jesse Scale) (Adult,Obstetrics,Pediatric)   Skin Integrity  making progress toward outcome

## 2018-01-10 NOTE — THERAPY TREATMENT NOTE
Acute Care - Physical Therapy Treatment Note  [facility]     Patient Name: Alexandro Hallman  : 1940  MRN: [id number]  Today's Date: 1/10/2018  Onset of Illness/Injury or Date of Surgery Date: 18  Date of Referral to PT: 18  Referring Physician: Iona Toro    Admit Date: 1/3/2018    Visit Dx:    ICD-10-CM ICD-9-CM   1. Altered mental status, unspecified altered mental status type R41.82 780.97   2. Acute metabolic encephalopathy G93.41 348.31   3. Left pulmonary infiltrate on CXR R91.8 793.19     Patient Active Problem List   Diagnosis   • Alzheimer's type dementia   • Central spinal stenosis   • Essential hypertension   • Hypothyroidism   • Benign prostatic hypertrophy (BPH) with incomplete bladder emptying   • Communicating hydrocephalus   • Parkinson's disease   • Gait instability   • Weakness   • Hallucinations, unspecified   • Generalized weakness   • Pneumonia   • Acute metabolic encephalopathy   • Altered mental status   • Metabolic encephalopathy   • Toxic encephalopathy               Adult Rehabilitation Note       01/10/18 1100 18 1120 18 1100    Rehab Assessment/Intervention    Discipline physical therapy assistant  -RW physical therapy assistant  -RW physical therapy assistant  -RW    Document Type therapy note (daily note)  -RW therapy note (daily note)  -RW therapy note (daily note)  -RW    Subjective Information agree to therapy;decreased LOC  -RW agree to therapy;decreased LOC  -RW agree to therapy;no complaints  -RW    Patient Effort, Rehab Treatment adequate  -RW adequate  -RW adequate  -RW    Precautions/Limitations fall precautions  -RW fall precautions  -RW fall precautions  -RW    Specific Treatment Considerations  More lethargic today, but more alert when sitting  -RW     Recorded by [RW] Kalli Moreland PTA [RW] Kalli Moreland PTA [RW] Kalli Moreland PTA    Pain Assessment    Pain Assessment No/denies pain  -RW No/denies pain  -RW No/denies pain   -RW    Recorded by [RW] Kalli Moreland PTA [RW] Kalli Moreland PTA [RW] Kalli Moreland PTA    Cognitive Assessment/Intervention    Current Cognitive/Communication Assessment impaired  -RW impaired  -RW impaired  -RW    Orientation Status oriented x 4;person  -RW oriented to;person  -RW oriented to;person  -RW    Follows Commands/Answers Questions 25% of the time;needs cueing;needs increased time;needs repetition  -RW 25% of the time;needs cueing;needs increased time;needs repetition  -RW 50% of the time;needs cueing;needs repetition  -RW    Personal Safety severe impairment  -RW severe impairment  -RW severe impairment  -RW    Personal Safety Interventions fall prevention program maintained;gait belt;nonskid shoes/slippers when out of bed  -RW fall prevention program maintained;gait belt;nonskid shoes/slippers when out of bed  -RW fall prevention program maintained;gait belt;nonskid shoes/slippers when out of bed  -RW    Recorded by [RW] Kalli Moreland PTA [RW] Kalli Moreland PTA [RW] Kalli Moreland PTA    Bed Mobility, Assessment/Treatment    Bed Mobility, Assistive Device head of bed elevated;draw sheet  -RW bed rails;head of bed elevated;draw sheet  -RW head of bed elevated;draw sheet  -RW    Bed Mob, Supine to Sit, [place] maximum assist (25% patient effort);2 person assist required;verbal cues required;nonverbal cues required (demo/gesture)  -RW maximum assist (25% patient effort);dependent (less than 25% patient effort);2 person assist required;verbal cues required;nonverbal cues required (demo/gesture)  -RW maximum assist (25% patient effort);2 person assist required;verbal cues required;nonverbal cues required (demo/gesture)  -RW    Bed Mob, Sit to Supine, [place] maximum assist (25% patient effort);2 person assist required;verbal cues required;nonverbal cues required (demo/gesture)  -RW maximum assist (25% patient effort);dependent (less than 25% patient effort);2 person assist  required;verbal cues required;nonverbal cues required (demo/gesture)  -RW maximum assist (25% patient effort);2 person assist required;verbal cues required;nonverbal cues required (demo/gesture)  -RW    Bed Mobility, Safety Issues cognitive deficits limit understanding;decreased use of arms for pushing/pulling;decreased use of legs for bridging/pushing  -RW cognitive deficits limit understanding  -RW cognitive deficits limit understanding;decreased use of arms for pushing/pulling;decreased use of legs for bridging/pushing;impaired trunk control for bed mobility  -RW    Bed Mobility, Impairments strength decreased;impaired balance  -RW strength decreased;impaired balance  -RW strength decreased;impaired balance;coordination impaired;postural control impaired  -RW    Bed Mobility, Comment Posterior lean initially in sitting. More alert upon sitting  -RW Posterior lean in sitting  -RW     Recorded by [RW] Kalli Moreland, PTA [RW] Kalli Moreland, PTA [RW] Kalli Moreland, PTA    Transfer Assessment/Treatment    Transfers, Sit-Stand [place] unable to perform  -RW unable to perform  -RW maximum assist (25% patient effort);2 person assist required;verbal cues required;nonverbal cues required (demo/gesture)  -RW    Transfers, Stand-Sit [place]   maximum assist (25% patient effort);2 person assist required;verbal cues required;nonverbal cues required (demo/gesture)  -RW    Transfers, Sit-Stand-Sit, Assist Device   rolling walker;elevated surface  -RW    Transfer, Safety Issues   weight-shifting ability decreased;loses balance backward  -RW    Transfer, Impairments   strength decreased;impaired balance;coordination impaired;postural control impaired  -RW    Transfer, Comment Attempted standing x3 at EOB, but pt unable to clear EOB. Tried x2 w/o AD, and tried x1 w/ RW, but did not improve transfer attempts  -RW Attempted standing x2 at EOB, but pt not keeping B feet on floor. Attempted w/ and w/o RW to stand.  -RW  Stood x3. Posterior lean upon standing  -RW    Recorded by [RW] Kalli Moreland PTA [RW] Kalli Moreland PTA [RW] Kalli Moreland PTA    Gait Assessment/Treatment    Gait, [place] Level not tested;not appropriate to assess  -RW  not tested  -RW    Recorded by [RW] Kalli Moreland PTA  [RW] Kalli Moreland PTA    Balance Skills Training    Sitting-Level of Assistance Moderate assistance  -RW Minimum assistance;Moderate assistance  -RW     Sitting-Balance Support Feet supported;Right upper extremity supported;Left upper extremity supported  -RW Feet supported;Right upper extremity supported;Left upper extremity supported  -RW     Sitting-Balance Activities Forward lean;Reaching for objects;Trunk control activities  -RW Forward lean;Trunk control activities  -RW     Sitting # of Minutes 6  -RW 7  -RW     Recorded by [RW] Kalli Moreland PTA [RW] Kalli Moreland PTA     Therapy Exercises    Bilateral Lower Extremities AAROM:;PROM:;10 reps;supine;ankle pumps/circles;heel slides;hip abduction/adduction  -RW  AAROM:;AROM:;5 reps;supine;sitting;hip abduction/adduction;SLR;LAQ  -RW    Recorded by [RW] Kalli Moreland PTA  [RW] Kalli Moreland PTA    Positioning and Restraints    Pre-Treatment Position in bed  -RW in bed  -RW in bed  -RW    Post Treatment Position bed  -RW bed  -RW bed  -RW    In Bed fowlers;call light within reach;encouraged to call for assist;exit alarm on;with family/caregiver;SCD pump applied  -RW fowlers;call light within reach;encouraged to call for assist;exit alarm on;with family/caregiver  -RW fowlers;call light within reach;encouraged to call for assist;exit alarm on;with family/caregiver;SCD pump applied  -RW    Recorded by [RW] Kalli Moreland PTA [RW] Kalli Moreland PTA [RW] Kalli Moreland PTA      User Key  (r) = Recorded By, (t) = Taken By, (c) = Cosigned By    Initials Name Effective Dates    ROSALVA Moreland PTA 04/06/16 -                 IP PT Goals       01/05/18 1123           Bed Mobility PT LTG    Bed Mobility PT LTG, Date Established 01/05/18  -MS      Bed Mobility PT LTG, Time to Achieve 2 wks  -MS      Bed Mobility PT LTG, Activity Type all bed mobility  -MS      Bed Mobility PT LTG, [place] Level minimum assist (75% patient effort);2 person assist required  -MS      Transfer Training PT LTG    Transfer Training PT LTG, Date Established 01/05/18  -MS      Transfer Training PT LTG, Time to Achieve 2 wks  -MS      Transfer Training PT LTG, Activity Type sit to stand/stand to sit  -MS      Transfer Training PT LTG, [place] Level minimum assist (75% patient effort);2 person assist required  -MS      Transfer Training PT LTG, Assist Device walker, rolling  -MS      Transfer Training 2 PT LTG    Transfer Training PT 2 LTG, Date Established 01/05/18  -MS      Transfer Training PT 2 LTG, Time to Achieve 2 wks  -MS      Transfer Training PT 2 LTG, Activity Type bed to chair /chair to bed  -MS      Transfer Training PT 2 LTG, [place] Level minimum assist (75% patient effort);2 person assist required  -MS      Transfer Training PT 2 LTG, Assist Device walker, rolling  -MS      Gait Training PT LTG    Gait Training Goal PT LTG, Date Established 01/05/18  -MS      Gait Training Goal PT LTG, Time to Achieve 2 wks  -MS      Gait Training Goal PT LTG, [place] Level minimum assist (75% patient effort);2 person assist required  -MS      Gait Training Goal PT LTG, Assist Device walker, rolling  -MS      Gait Training Goal PT LTG, Distance to Achieve 30 feet  -MS        User Key  (r) = Recorded By, (t) = Taken By, (c) = Cosigned By    Initials Name Provider Type    MS Jarvis Thomas, PT Physical Therapist          Physical Therapy Education     Title: PT OT SLP Therapies (Active)     Topic: Physical Therapy (Active)     Point: Mobility training (Active)    Learning Progress Summary    Learner Readiness Method Response Comment Documented by Status   Patient Acceptance  E,TB,D NR   01/10/18 1235 Active    Acceptance E,TB,D NR   01/09/18 1307 Active    Acceptance E,TB,D NR   01/08/18 1124 Active    Acceptance E,TB,D NR,VU   01/07/18 1129 Done    Nonacceptance E,D NR  MS 01/05/18 1123 Active   Family Acceptance E,TB,D NR   01/09/18 1307 Active    Acceptance E,TB,D NR,VU  SV 01/07/18 1129 Done               Point: Home exercise program (Active)    Learning Progress Summary    Learner Readiness Method Response Comment Documented by Status   Patient Acceptance E,TB,D NR   01/10/18 1235 Active    Acceptance E,TB,D NR   01/08/18 1124 Active    Acceptance E,TB,D NR,VU   01/07/18 1129 Done    Nonacceptance E,D NR  MS 01/05/18 1123 Active   Family Acceptance E,TB,D NR,VU   01/07/18 1129 Done               Point: Body mechanics (Active)    Learning Progress Summary    Learner Readiness Method Response Comment Documented by Status   Patient Acceptance E,TB,D NR   01/10/18 1235 Active    Acceptance E,TB,D NR   01/09/18 1307 Active    Acceptance E,TB,D NR   01/08/18 1124 Active    Nonacceptance E,D NR  MS 01/05/18 1123 Active   Family Acceptance E,TB,D NR   01/09/18 1307 Active               Point: Precautions (Active)    Learning Progress Summary    Learner Readiness Method Response Comment Documented by Status   Patient Acceptance E,TB,D NR   01/10/18 1235 Active    Acceptance E,TB,D NR   01/09/18 1307 Active    Acceptance E,TB,D NR   01/08/18 1124 Active    Nonacceptance E,D NR  MS 01/05/18 1123 Active   Family Acceptance E,TB,D NR   01/09/18 1307 Active                      User Key     Initials Effective Dates Name Provider Type Discipline    MS 12/01/15 -  Jarvis Thomas, PT Physical Therapist PT     01/17/16 -  Dora Cruz, PT Physical Therapist PT     04/06/16 -  Kalli Moreland, PTA Physical Therapy Assistant PT                    PT Recommendation and Plan  Anticipated Discharge Disposition: skilled nursing facility  Planned Therapy  Interventions: balance training, bed mobility training, gait training, home exercise program, patient/family education, postural re-education, ROM (Range of Motion), strengthening, transfer training  PT Frequency: daily  Plan of Care Review  Plan Of Care Reviewed With: patient  Outcome Summary/Follow up Plan: Pt still lethargic today. Able to sit at EOB, but posterior lean present. Not following commands to correct sitting balance. Attempted standing x3 at EOB, but unable to follow commands or clear EOB to stand.           Outcome Measures       01/10/18 1117 01/09/18 1120 01/08/18 1100    How much help from another person do you currently need...    Turning from your back to your side while in flat bed without using bedrails? 2  -RW 2  -RW 2  -RW    Moving from lying on back to sitting on the side of a flat bed without bedrails? 2  -RW 2  -RW 2  -RW    Moving to and from a bed to a chair (including a wheelchair)? 1  -RW 1  -RW 1  -RW    Standing up from a chair using your arms (e.g., wheelchair, bedside chair)? 1  -RW 1  -RW 2  -RW    Climbing 3-5 steps with a railing? 1  -RW 1  -RW 1  -RW    To walk in hospital room? 1  -RW 1  -RW 1  -RW    AM-PAC 6 Clicks Score 8  -RW 8  -RW 9  -RW    Functional Assessment    Outcome Measure Options AM-PAC 6 Clicks Basic Mobility (PT)  -RW AM-PAC 6 Clicks Basic Mobility (PT)  -RW AM-PAC 6 Clicks Basic Mobility (PT)  -RW      User Key  (r) = Recorded By, (t) = Taken By, (c) = Cosigned By    Initials Name Provider Type    ROSALVA Moreland PTA Physical Therapy Assistant           Time Calculation:         PT Charges       01/10/18 1119          Time Calculation    Start Time 1117  -RW      Stop Time 1133  -RW      Time Calculation (min) 16 min  -RW      PT Received On 01/10/18  -RW      PT - Next Appointment 01/11/18  -RW        User Key  (r) = Recorded By, (t) = Taken By, (c) = Cosigned By    Initials Name Provider Type    ROSALVA Moreland PTA Physical Therapy Assistant           Therapy Charges for Today     Code Description Service Date Service Provider Modifiers Qty    33597919183 HC PT THER PROC EA 15 MIN 1/9/2018 Kalli Moreland, PTA GP 1    62244002383 HC PT THER SUPP EA 15 MIN 1/9/2018 Kalli Moreland, PTA GP 1    36476807336 HC PT THER PROC EA 15 MIN 1/10/2018 Kalli Moreland, PTA GP 1    49924085241 HC PT THER SUPP EA 15 MIN 1/10/2018 Kalli Moreland, PTA GP 1          PT G-Codes  Outcome Measure Options: AM-PAC 6 Clicks Basic Mobility (PT)    Kalli Moreland PTA  1/10/2018

## 2018-01-10 NOTE — PROGRESS NOTES
"   LOS: 7 days   Patient Care Team:  Buster Howe MD as PCP - General (Family Medicine)  Buster Howe MD as PCP - Family Medicine  Spenser Borrero MD as Consulting Physician (Cardiology)  Marielle Braxton MD (Inactive) as Consulting Physician (Neurology)    Chief Complaint: tired    Subjective     HPI Comments: Feeling a little better today. Ate all his breakfast. Breathing easily.    Altered Mental Status   Associated symptoms include weakness. Pertinent negatives include no anorexia, chest pain, coughing, fever, nausea or vomiting.       Subjective:  Symptoms:  Stable.  He reports weakness.  No shortness of breath, malaise, cough, chest pain, headache, chest pressure, anorexia, diarrhea or anxiety.    Diet:  Adequate intake.  No nausea or vomiting.    Activity level: Impaired due to weakness.    Pain:  He reports no pain.        History taken from: patient chart family RN    Objective     Vital Signs  Temp:  [97.2 °F (36.2 °C)-98.1 °F (36.7 °C)] 97.5 °F (36.4 °C)  Heart Rate:  [53-60] 53  Resp:  [16-20] 16  BP: (103-158)/() 103/67    Objective:  General Appearance:  Comfortable and in no acute distress.    Vital signs: (most recent): Blood pressure 103/67, pulse 53, temperature 97.5 °F (36.4 °C), temperature source Oral, resp. rate 16, height 190.5 cm (75\"), weight 72.8 kg (160 lb 6.4 oz), SpO2 96 %.  Vital signs are normal.  No fever.  (BPs better).    Output: Producing urine and no stool output.    HEENT: Normal HEENT exam.    Lungs:  Normal respiratory rate and normal effort.  Breath sounds clear to auscultation.    Heart: Normal rate.  Regular rhythm.    Abdomen: Abdomen is soft.  Bowel sounds are normal.   There is no abdominal tenderness.     Extremities: There is no dependent edema.    Pulses: Distal pulses are intact.    Neurological: Patient is alert.    Pupils:  Pupils are equal, round, and reactive to light.    Skin:  Warm and dry.              Results Review:     I reviewed the patient's " new clinical results.  I reviewed the patient's other test results and agree with the interpretation  Discussed with patient, wife, RN, and CCP    Medication Review: reviewed    Assessment/Plan     Principal Problem:    Acute metabolic encephalopathy  Active Problems:    Alzheimer's type dementia    Central spinal stenosis    Essential hypertension    Hypothyroidism    Communicating hydrocephalus    Parkinson's disease    Generalized weakness    Pneumonia    Altered mental status    Metabolic encephalopathy    Toxic encephalopathy          Plan:   (Completed 7d of Zosyn  WBC normalized but now back up just a bit, will watch another night  Continue PT  Mirapex and Risperdal dc'd and seems to be doing a little better now  Continue Flomax and ledezma for now, voiding trial when mobility improved  DC'd IVFs  ST bui noted, po precautions in place  Bowel regimen, increase Miralax to BID  DC'd Amlodipine and BPs much better today  Wife now okay with plans for SNF at [place], [facility] to assist).       Abhishek Cade MD  01/10/18  10:58 AM    Time: 20min

## 2018-01-11 PROBLEM — Z78.9: Status: ACTIVE | Noted: 2018-01-01

## 2018-01-11 PROBLEM — Z51.89 ENCOUNTER FOR REHABILITATION: Status: ACTIVE | Noted: 2018-01-01

## 2018-01-11 NOTE — DISCHARGE SUMMARY
"Date of Admission: 1/3/2018  Date of Discharge:  1/11/2018  Primary Care Physician: Buster Howe MD     Discharge Diagnosis:  Active Hospital Problems (** Indicates Principal Problem)    Diagnosis Date Noted   • **Acute metabolic encephalopathy [G93.41] 01/01/2018   • Metabolic encephalopathy [G93.41] 01/05/2018   • Toxic encephalopathy [G92] 01/05/2018   • Altered mental status [R41.82] 01/03/2018   • Pneumonia [J18.9] 12/31/2017   • Generalized weakness [R53.1] 12/30/2017   • Parkinson's disease [G20] 09/13/2016   • Communicating hydrocephalus [G91.0] 07/12/2016   • Essential hypertension [I10] 01/11/2016   • Hypothyroidism [E03.9] 01/11/2016   • Central spinal stenosis [M48.00] 12/10/2015   • Alzheimer's type dementia [G30.9] 11/18/2015      Resolved Hospital Problems    Diagnosis Date Noted Date Resolved   No resolved problems to display.       Presenting Problem/History of Present Illness:  Left pulmonary infiltrate on CXR [R91.8]  Altered mental status, unspecified altered mental status type [R41.82]  Acute metabolic encephalopathy [G93.41]     Hospital Course:  The patient is a 78 y.o. man who was readmitted to our service after being at rehabilitation for 1 day.  He was admitted for altered mental status.  Mirapex and Risperdal were both stopped.  Neurology saw the patient.  There was no evidence of acute stroke.  He was given a 7 day course of antibiotics to cover for the previous pneumonia and aspiration.  He was seen by speech therapy and physical therapy.  White count was mildly elevated yesterday but is normal today. He is afebrile. Dial catheter was placed because of urinary retention. MiraLAX will be changed to lactulose because it will be more effective.  He is ready for transfer to subacute rehabilitation.    Stable condition; fair prognosis    Exam Today: Sleepy but able to wake up.  Answers a few questions with \"yes\" and \"no\".  Heart is regular without murmur.  Lungs with decreased breath sounds " but clear.  Abdomen is soft and possibly mildly distended.  No mass.  Extremities no edema    Procedures Performed:  CT head without contrast 1/4/18  MRI of the brain with and without contrast 1/4/18       Consults:   Dr. Abhishek Cotto     Discharge Disposition:  Skilled Nursing Facility (DC - External)    Discharge Medications:   Alexandro Hallman   [place] Medication Instructions [id number]    Printed on:01/11/18 4138   Medication Information                      acetaminophen (TYLENOL) 325 MG tablet  Take 2 tablets by mouth Every 6 (Six) Hours As Needed for Mild Pain .             aspirin 81 MG chewable tablet  Chew 81 mg daily.             cholecalciferol (VITAMIN D3) 1000 UNITS tablet  Take 2,000 Units by mouth 2 (Two) Times a Day.             donepezil (ARICEPT) 23 MG tablet  Take 23 mg by mouth Every Night.             enoxaparin (LOVENOX) 40 MG/0.4ML solution syringe  Inject 0.4 mL under the skin Every Night.             isosorbide mononitrate (IMDUR) 30 MG 24 hr tablet  Take 1 tablet by mouth Daily.             lactulose (CHRONULAC) 10 GM/15ML solution  Take 15 mL by mouth 2 (Two) Times a Day.             levothyroxine (SYNTHROID, LEVOTHROID) 50 MCG tablet  Take 1 tablet by mouth Daily for 180 days.             memantine (NAMENDA XR) 28 MG capsule sustained-release 24 hr extended release capsule  Take 28 mg by mouth Daily.             sennosides-docusate sodium (SENOKOT-S) 8.6-50 MG tablet  Take 2 tablets by mouth Every Night.             tamsulosin (FLOMAX) 0.4 MG capsule 24 hr capsule  Take 1 capsule by mouth Every Night for 180 days.             Umeclidinium-Vilanterol (ANORO ELLIPTA) 62.5-25 MCG/INH aerosol powder   Inhale 1 puff Daily.                 Discharge Diet:   Diet Instructions     Diet: Regular, Dysphagia; Thin Liquids, No Restrictions; Pureed; Thin       Discharge Diet:   Regular  Dysphagia      Fluid Consistency:  Thin Liquids, No Restrictions   Pureed Options:  Pureed   Fluid Consistency:   Thin   Assist with feeding                 Activity at Discharge:   Activity Instructions     Other Instructions (Specify)       Continue physical therapy.  Up with assistance.  Routine urinary catheter care                 Follow-up Appointments:  Nursing home physician in one day  Dr. Buster Howe one to 2 weeks after released from nursing home    Future Appointments  Date Time Provider Department Center   11/29/2018 12:40 PM Spenser Borrero MD [id number] None         Test Results Pending at Discharge: None       Angelic Croft MD  01/11/18  8:33 AM    Time Spent on Discharge Activities: 40 minutes

## 2018-01-11 NOTE — PROGRESS NOTES
Continued Stay Note  [facility]     Patient Name: Alexandro Hallman  MRN: [id number]  Today's Date: 1/11/2018    Admit Date: 1/3/2018          Discharge Plan       01/11/18 0920    Case Management/Social Work Plan    Plan [facility]- skilled    Additional Comments [phone] to inform of patient's discharge today and ambulance scheduled for 11:30am today.  Informed wife, Shleby at bedside of patient's discharge to rehab and ambulance scheduled for 11;30 am today.  Informed HARINI Goins of [facility] scheduled for 11:30am and packet with report # [phone] given to him.....Shala Celaya RN,CCP               Discharge Codes     None        Expected Discharge Date and Time     Expected Discharge Date Expected Discharge Time    Jan 11, 2018             Shala Celaya RN

## 2018-01-11 NOTE — DISCHARGE INSTR - ACTIVITY
Dr. Croft J wants the ledezma to stay in for 7 more days,and then pull and complete a voiding trial

## 2018-01-11 NOTE — PLAN OF CARE
Problem: Patient Care Overview (Adult)  Goal: Plan of Care Review  Outcome: Ongoing (interventions implemented as appropriate)   01/11/18 1030   Coping/Psychosocial Response Interventions   Plan Of Care Reviewed With patient   Patient Care Overview   Progress improving   Outcome Evaluation   Outcome Summary/Follow up Plan Discharge today to [facility]

## 2018-01-11 NOTE — PROGRESS NOTES
Case Management Discharge Note    Final Note: [facility]  [facility]/18  [facility]    Discharge Placement     Facility/Agency Request Status Selected? Address Phone Number Fax Number    [facility] Accepted    Yes [address] [place] [address] [phone]    [facility] [place] Accepted     [address] [place] [facility] [place] [address] [phone]    [facility] Accepted     [address] [facility] [address] [phone]    [facility] Pending - Request Sent     [address] [facility] [address] [phone]        Shala Celaya RN 1/10/2018 10:19    1/10/18  Per Lnidy- no beds available.... HARINI Sarabia,CCP               [facility] Pending - Request Sent     [address] [facility] [address] [phone]        Shala Celaya RN 1/10/2018 10:19    1/10/18  Per Anaya - no beds available..... Shala Celaya RN,CCP               Health system Pending - Request Sent     [address] [facility] [address] [phone]        Shala Celaya RN 1/10/2018 10:18    1/10/18  No beds available per Anaya..... Shala CelayaRN,CCP               [facility] Pending - Request Sent     [address] [facility] [address] [phone]    [facility] Declined   Per Tatyana, cannot take 2 person assist due to staffing now..     [address] [facility] [address] [phone]    [facility] MARYSE EVANS Declined     [address] [facility] [id number] [phone]        Shala Celaya RN 1/9/2018 15:25    1/9/18 No beds availaible per [facility].... HARINI Sarabia,[facility]                   Ambulance: Yellow    Discharge Codes: 03   Discharged/transferred to skilled nursing facility (SNF) with Medicare certification in anticipation of skilled care

## 2018-01-11 NOTE — PLAN OF CARE
Problem: Patient Care Overview (Adult)  Goal: Plan of Care Review  Outcome: Ongoing (interventions implemented as appropriate)   01/11/18 0429   Coping/Psychosocial Response Interventions   Plan Of Care Reviewed With patient   Patient Care Overview   Progress no change   Outcome Evaluation   Outcome Summary/Follow up Plan patient still minimally responsive/confused; apparently improved from prior; f/c in place; prob dc with it to rehab; iv abx finished; will monitor labs     Goal: Adult Individualization and Mutuality  Outcome: Ongoing (interventions implemented as appropriate)    Goal: Discharge Needs Assessment  Outcome: Ongoing (interventions implemented as appropriate)      Problem: Confusion, Acute (Adult)  Goal: Cognitive/Functional Impairments Minimized  Outcome: Ongoing (interventions implemented as appropriate)    Goal: Safety  Outcome: Ongoing (interventions implemented as appropriate)      Problem: Skin Integrity Impairment, Risk/Actual (Adult)  Goal: Identify Related Risk Factors and Signs and Symptoms  Outcome: Outcome(s) achieved Date Met: 01/11/18    Goal: Skin Integrity/Wound Healing  Outcome: Ongoing (interventions implemented as appropriate)      Problem: Fall Risk (Adult)  Goal: Absence of Falls  Outcome: Ongoing (interventions implemented as appropriate)      Problem: Pressure Ulcer Risk (Jesse Scale) (Adult,Obstetrics,Pediatric)  Goal: Skin Integrity  Outcome: Ongoing (interventions implemented as appropriate)

## 2019-11-07 NOTE — PROGRESS NOTES
Physical Therapy discharge Note  Visits:8    Subjective : Alexandro Hallman reports: No new compalints.   Wife reports that he does significantly better at PT with walking and motivation vs. Being home.  She does notice that he has more energy vs beginning PT.      Objective     Functional Assessment     Comments  TU.95 sec (I)   Sit to stand: 12x no UE, independently.  No LOB  Balance: (B) LE, Narrow SONIA eyes closed:  30sec.  (supervision for safety but not needed)     Gait in clinic:  7 min non-stop with SBAx1.  No LOB and several change of direction and obstacle avoidance.  At conclusion: Sp02: at 91%.  No visible signs of distress at the end of walking bout.  LOVE balance test: 50/56    See Exercise, Manual, and Modality Logs for complete treatment.     Assessment & Plan     Assessment  Impairments: abnormal coordination  Assessment details: Ravinder has done very well with PT intervention and based on testing outcomes, he has met his PT maximal benefit.    Prognosis details: SHORT TERM GOALS: 9 visits (All met)   1. Pt will improve standing balance with eyes closed to require only supervision > 15 sec on foam.     2. Pt. will improve (B) LE strength to >4/5 (in sitting) as needed to perform sit to stand txf x 5 without UE.   3. Pt. Will demonstrate the ability to ambulate with SBA x 1 with proper adjustments and forward gaze for 5 min. (SpO2>92%)   4. Pt. Will traverse 10 stairs (up/down) with 1 railing and supervision for entering/exiting home safely.      LONG TERM GOALS: 15 visits   1. Pt will improve standing balance with narrow SONIA, eyes closed to require only supervision > 20 sec. MET  2. Pt. will improve gluteal and gastrocs strength to 4+/5 (in sitting) as needed to improve 30 sec sit to stand to 10x with UE assist on thighs as needed.  MET  3. Pt. Will demonstrate the ability to ambulate with SBA x 1, and change of direction with only verbal cues for >7 min.SpO2 > 92%  (met for time but SpO2: 91%)   4.  Pt. Will decrease TUG score to < 10  Sec, Lawrence Balance > 48/56 (MET  demonstrating improved mobility safety.       Plan  Therapy options: will not be seen for skilled physical therapy services  Treatment plan discussed with: patient  Functional Limitations: unable to perform repetitive tasks    Discharge from PT        Manual Therapy:    -     mins  97080;  Therapeutic Exercise:    10     mins  56856;     Neuromuscular Kiera:    15    mins  24055;    Therapeutic Activity:     -     mins  87020;     Gait Training:      -     mins  80700;     Ultrasound:     -     mins  30013;    Electrical Stimulation:    -     mins  40883 ( );  Dry Needling     -     mins self-pay    Timed Treatment:   25   mins   Total Treatment:     35   mins    ALDAIR Nelson License #[id number]    Physical Therapist   Complex Repair And Graft Additional Text (Will Appearing After The Standard Complex Repair Text): The complex repair was not sufficient to completely close the primary defect. The remaining additional defect was repaired with the graft mentioned below.